# Patient Record
Sex: MALE | Race: WHITE | NOT HISPANIC OR LATINO | Employment: OTHER | URBAN - METROPOLITAN AREA
[De-identification: names, ages, dates, MRNs, and addresses within clinical notes are randomized per-mention and may not be internally consistent; named-entity substitution may affect disease eponyms.]

---

## 2023-02-24 PROBLEM — G89.29 CHRONIC LOW BACK PAIN: Status: ACTIVE | Noted: 2023-02-24

## 2023-02-24 PROBLEM — M54.50 CHRONIC LOW BACK PAIN: Status: ACTIVE | Noted: 2023-02-24

## 2023-02-24 PROBLEM — R59.0 MEDIASTINAL ADENOPATHY: Status: ACTIVE | Noted: 2023-02-24

## 2023-02-24 PROBLEM — J44.9 COPD, VERY SEVERE (MULTI): Status: ACTIVE | Noted: 2023-02-24

## 2023-02-24 PROBLEM — G47.00 INSOMNIA: Status: ACTIVE | Noted: 2023-02-24

## 2023-02-24 PROBLEM — F14.91 HISTORY OF COCAINE USE: Status: ACTIVE | Noted: 2023-02-24

## 2023-02-24 PROBLEM — R63.4 WEIGHT LOSS, UNINTENTIONAL: Status: ACTIVE | Noted: 2023-02-24

## 2023-02-24 PROBLEM — R12 HEARTBURN: Status: ACTIVE | Noted: 2023-02-24

## 2023-02-24 PROBLEM — G43.009 NONINTRACTABLE MIGRAINE, UNSPECIFIED MIGRAINE TYPE: Status: ACTIVE | Noted: 2023-02-24

## 2023-02-24 PROBLEM — R00.0 TACHYCARDIA: Status: ACTIVE | Noted: 2023-02-24

## 2023-02-24 PROBLEM — M50.20 CERVICAL HERNIATED DISC: Status: ACTIVE | Noted: 2023-02-24

## 2023-02-24 PROBLEM — F32.9 MDD (MAJOR DEPRESSIVE DISORDER), SINGLE EPISODE: Status: ACTIVE | Noted: 2023-02-24

## 2023-02-24 PROBLEM — M79.89 LEG SWELLING: Status: ACTIVE | Noted: 2023-02-24

## 2023-02-24 PROBLEM — F41.1 GENERALIZED ANXIETY DISORDER: Status: ACTIVE | Noted: 2023-02-24

## 2023-02-24 PROBLEM — F51.5 NIGHTMARE: Status: ACTIVE | Noted: 2023-02-24

## 2023-02-24 PROBLEM — F32.A DEPRESSION: Status: ACTIVE | Noted: 2023-02-24

## 2023-02-24 PROBLEM — R31.9 HEMATURIA: Status: ACTIVE | Noted: 2023-02-24

## 2023-02-24 PROBLEM — K59.00 CONSTIPATION: Status: ACTIVE | Noted: 2023-02-24

## 2023-02-24 PROBLEM — J30.9 ALLERGIC RHINITIS: Status: ACTIVE | Noted: 2023-02-24

## 2023-02-24 PROBLEM — R91.1 LUNG NODULE: Status: ACTIVE | Noted: 2023-02-24

## 2023-02-24 PROBLEM — R41.3 MEMORY LOSS: Status: ACTIVE | Noted: 2023-02-24

## 2023-02-24 PROBLEM — J45.40 MODERATE PERSISTENT ASTHMA WITHOUT COMPLICATION (HHS-HCC): Status: ACTIVE | Noted: 2023-02-24

## 2023-02-24 PROBLEM — G31.84 MILD COGNITIVE IMPAIRMENT WITH MEMORY LOSS: Status: ACTIVE | Noted: 2023-02-24

## 2023-02-24 PROBLEM — G20.A1 PARKINSON'S DISEASE (MULTI): Status: ACTIVE | Noted: 2023-02-24

## 2023-02-24 PROBLEM — J44.1 COPD EXACERBATION (MULTI): Status: ACTIVE | Noted: 2023-02-24

## 2023-02-24 PROBLEM — L98.491: Status: ACTIVE | Noted: 2023-02-24

## 2023-02-24 PROBLEM — F43.10 PTSD (POST-TRAUMATIC STRESS DISORDER): Status: ACTIVE | Noted: 2023-02-24

## 2023-02-24 PROBLEM — M51.27 DISPLACEMENT OF LUMBOSACRAL INTERVERTEBRAL DISC: Status: ACTIVE | Noted: 2023-02-24

## 2023-02-24 PROBLEM — Z86.69 HISTORY OF DEAFNESS: Status: ACTIVE | Noted: 2023-02-24

## 2023-02-24 PROBLEM — M54.32 SCIATICA OF LEFT SIDE: Status: ACTIVE | Noted: 2023-02-24

## 2023-02-24 PROBLEM — F31.61 BIPOLAR DISORDER, CURRENT EPISODE MIXED, MILD (MULTI): Status: ACTIVE | Noted: 2023-02-24

## 2023-02-24 RX ORDER — QUETIAPINE FUMARATE 25 MG/1
2 TABLET, FILM COATED ORAL NIGHTLY
COMMUNITY
Start: 2021-07-12 | End: 2024-01-02 | Stop reason: DRUGHIGH

## 2023-02-24 RX ORDER — CARBIDOPA AND LEVODOPA 25; 100 MG/1; MG/1
1 TABLET ORAL 3 TIMES DAILY
COMMUNITY
Start: 2021-05-07 | End: 2023-06-14

## 2023-02-24 RX ORDER — IPRATROPIUM BROMIDE AND ALBUTEROL SULFATE 2.5; .5 MG/3ML; MG/3ML
SOLUTION RESPIRATORY (INHALATION)
Status: ON HOLD | COMMUNITY
Start: 2021-11-08 | End: 2024-03-04 | Stop reason: ENTERED-IN-ERROR

## 2023-02-24 RX ORDER — PROPRANOLOL HYDROCHLORIDE 80 MG/1
CAPSULE, EXTENDED RELEASE ORAL
COMMUNITY
Start: 2021-11-10 | End: 2024-01-25 | Stop reason: ALTCHOICE

## 2023-02-24 RX ORDER — BUPRENORPHINE AND NALOXONE 8; 2 MG/1; MG/1
FILM, SOLUBLE BUCCAL; SUBLINGUAL
COMMUNITY
Start: 2021-11-10 | End: 2023-05-10 | Stop reason: ALTCHOICE

## 2023-02-24 RX ORDER — OMEPRAZOLE 40 MG/1
1 CAPSULE, DELAYED RELEASE ORAL
COMMUNITY
Start: 2021-09-01 | End: 2023-05-30 | Stop reason: SDUPTHER

## 2023-02-24 RX ORDER — TRAZODONE HYDROCHLORIDE 100 MG/1
100 TABLET ORAL NIGHTLY
COMMUNITY
Start: 2021-07-12 | End: 2024-03-18 | Stop reason: HOSPADM

## 2023-02-24 RX ORDER — BUDESONIDE, GLYCOPYRROLATE, AND FORMOTEROL FUMARATE 160; 9; 4.8 UG/1; UG/1; UG/1
2 AEROSOL, METERED RESPIRATORY (INHALATION) 2 TIMES DAILY
COMMUNITY
Start: 2021-11-29 | End: 2023-08-24 | Stop reason: SDUPTHER

## 2023-02-24 RX ORDER — MELOXICAM 15 MG/1
1 TABLET ORAL DAILY PRN
Status: ON HOLD | COMMUNITY
Start: 2021-11-29 | End: 2024-03-04 | Stop reason: ENTERED-IN-ERROR

## 2023-02-24 RX ORDER — MONTELUKAST SODIUM 10 MG/1
1 TABLET ORAL DAILY
Status: ON HOLD | COMMUNITY
Start: 2022-06-17 | End: 2024-03-04

## 2023-02-24 RX ORDER — ALBUTEROL SULFATE 90 UG/1
1-2 AEROSOL, METERED RESPIRATORY (INHALATION)
COMMUNITY
End: 2023-03-23 | Stop reason: SDUPTHER

## 2023-02-24 RX ORDER — PRAZOSIN HYDROCHLORIDE 2 MG/1
CAPSULE ORAL
COMMUNITY
Start: 2021-07-22 | End: 2023-05-10 | Stop reason: ALTCHOICE

## 2023-02-24 RX ORDER — BUSPIRONE HYDROCHLORIDE 30 MG/1
TABLET ORAL
COMMUNITY
Start: 2022-04-11 | End: 2024-01-02

## 2023-02-24 RX ORDER — ESCITALOPRAM OXALATE 10 MG/1
TABLET ORAL
COMMUNITY
Start: 2021-08-23 | End: 2023-05-10 | Stop reason: ALTCHOICE

## 2023-02-24 RX ORDER — HYDROXYZINE HYDROCHLORIDE 25 MG/1
TABLET, FILM COATED ORAL
Status: ON HOLD | COMMUNITY
Start: 2022-04-11 | End: 2024-03-04 | Stop reason: ENTERED-IN-ERROR

## 2023-02-25 RX ORDER — GABAPENTIN 300 MG/1
1 CAPSULE ORAL 2 TIMES DAILY
COMMUNITY
Start: 2016-05-04 | End: 2023-05-24

## 2023-02-25 RX ORDER — GABAPENTIN 600 MG/1
1 TABLET ORAL NIGHTLY
COMMUNITY
Start: 2022-08-29 | End: 2023-05-24 | Stop reason: SDUPTHER

## 2023-03-15 ENCOUNTER — APPOINTMENT (OUTPATIENT)
Dept: PRIMARY CARE | Facility: CLINIC | Age: 68
End: 2023-03-15

## 2023-03-22 DIAGNOSIS — J44.9 COPD, VERY SEVERE (MULTI): Primary | ICD-10-CM

## 2023-03-23 RX ORDER — ALBUTEROL SULFATE 90 UG/1
AEROSOL, METERED RESPIRATORY (INHALATION)
Qty: 18 G | Refills: 3 | Status: SHIPPED | OUTPATIENT
Start: 2023-03-23 | End: 2023-07-12

## 2023-05-01 DIAGNOSIS — J44.9 COPD, VERY SEVERE (MULTI): ICD-10-CM

## 2023-05-05 RX ORDER — PREDNISONE 10 MG/1
10 TABLET ORAL
COMMUNITY
Start: 2023-02-22 | End: 2023-05-05 | Stop reason: SDUPTHER

## 2023-05-05 RX ORDER — PREDNISONE 10 MG/1
TABLET ORAL
Qty: 39 TABLET | Refills: 0 | Status: SHIPPED | OUTPATIENT
Start: 2023-05-05 | End: 2023-05-24 | Stop reason: ALTCHOICE

## 2023-05-05 NOTE — TELEPHONE ENCOUNTER
Requested Prescriptions     Pending Prescriptions Disp Refills    predniSONE (Deltasone) 10 mg tablet 39 tablet 0     Sig: Take 2 tab PO x 7 days, then 1.5 tabs PO daily x 7 days, than 1 daily until gone

## 2023-05-10 ENCOUNTER — OFFICE VISIT (OUTPATIENT)
Dept: PRIMARY CARE | Facility: CLINIC | Age: 68
End: 2023-05-10
Payer: MEDICARE

## 2023-05-10 VITALS
HEART RATE: 85 BPM | HEIGHT: 67 IN | DIASTOLIC BLOOD PRESSURE: 58 MMHG | WEIGHT: 137 LBS | OXYGEN SATURATION: 88 % | BODY MASS INDEX: 21.5 KG/M2 | SYSTOLIC BLOOD PRESSURE: 95 MMHG

## 2023-05-10 DIAGNOSIS — F41.1 GENERALIZED ANXIETY DISORDER: Primary | ICD-10-CM

## 2023-05-10 DIAGNOSIS — J44.9 COPD, VERY SEVERE (MULTI): ICD-10-CM

## 2023-05-10 DIAGNOSIS — J06.9 UPPER RESPIRATORY TRACT INFECTION, UNSPECIFIED TYPE: ICD-10-CM

## 2023-05-10 DIAGNOSIS — F32.A DEPRESSION, UNSPECIFIED DEPRESSION TYPE: ICD-10-CM

## 2023-05-10 DIAGNOSIS — J45.40 MODERATE PERSISTENT ASTHMA WITHOUT COMPLICATION (HHS-HCC): ICD-10-CM

## 2023-05-10 DIAGNOSIS — G47.00 INSOMNIA, UNSPECIFIED TYPE: ICD-10-CM

## 2023-05-10 PROCEDURE — 1160F RVW MEDS BY RX/DR IN RCRD: CPT | Performed by: NURSE PRACTITIONER

## 2023-05-10 PROCEDURE — 1159F MED LIST DOCD IN RCRD: CPT | Performed by: NURSE PRACTITIONER

## 2023-05-10 PROCEDURE — 99214 OFFICE O/P EST MOD 30 MIN: CPT | Performed by: NURSE PRACTITIONER

## 2023-05-10 RX ORDER — ESCITALOPRAM OXALATE 10 MG/1
10 TABLET ORAL DAILY
COMMUNITY
End: 2023-05-10 | Stop reason: SDUPTHER

## 2023-05-10 RX ORDER — ESCITALOPRAM OXALATE 20 MG/1
20 TABLET ORAL DAILY
Qty: 90 TABLET | Refills: 1 | Status: SHIPPED | OUTPATIENT
Start: 2023-05-10 | End: 2023-07-13 | Stop reason: SDUPTHER

## 2023-05-10 RX ORDER — HYDROXYZINE PAMOATE 25 MG/1
25 CAPSULE ORAL NIGHTLY
COMMUNITY
Start: 2023-05-01 | End: 2024-03-18 | Stop reason: HOSPADM

## 2023-05-10 RX ORDER — ALBUTEROL SULFATE 1.25 MG/3ML
1.25 SOLUTION RESPIRATORY (INHALATION) EVERY 4 HOURS PRN
Qty: 75 ML | Refills: 2 | Status: SHIPPED | OUTPATIENT
Start: 2023-05-10 | End: 2023-07-13 | Stop reason: SDUPTHER

## 2023-05-10 RX ORDER — CLONIDINE HYDROCHLORIDE 0.1 MG/1
0.1 TABLET ORAL 2 TIMES DAILY
Status: ON HOLD | COMMUNITY
Start: 2023-05-02 | End: 2024-03-04 | Stop reason: ENTERED-IN-ERROR

## 2023-05-10 RX ORDER — DOXYCYCLINE 100 MG/1
100 CAPSULE ORAL 2 TIMES DAILY
Qty: 14 CAPSULE | Refills: 0 | Status: SHIPPED | OUTPATIENT
Start: 2023-05-10 | End: 2023-05-20

## 2023-05-10 ASSESSMENT — ENCOUNTER SYMPTOMS
CHILLS: 0
FEVER: 0
CONSTIPATION: 0
RHINORRHEA: 1
WEAKNESS: 0
VOMITING: 0
SHORTNESS OF BREATH: 1
FATIGUE: 1
PSYCHIATRIC NEGATIVE: 1
HEADACHES: 0
NUMBNESS: 0
ABDOMINAL PAIN: 0
MUSCULOSKELETAL NEGATIVE: 1
DIZZINESS: 0
NAUSEA: 0
COUGH: 1
DIARRHEA: 0
PALPITATIONS: 0
SORE THROAT: 0

## 2023-05-10 ASSESSMENT — PATIENT HEALTH QUESTIONNAIRE - PHQ9
SUM OF ALL RESPONSES TO PHQ9 QUESTIONS 1 AND 2: 0
1. LITTLE INTEREST OR PLEASURE IN DOING THINGS: NOT AT ALL
2. FEELING DOWN, DEPRESSED OR HOPELESS: NOT AT ALL

## 2023-05-10 NOTE — ASSESSMENT & PLAN NOTE
Continue all medicines ordered by pulmonary as prescribed  Follow-up with pulmonary soon, call to make an appointment

## 2023-05-10 NOTE — PROGRESS NOTES
"Subjective   Patient ID: Chris Wu is a 68 y.o. male who presents for follow upof continued congestion.      HPI   Increased shortness of breath  Still smoking, supplemental oxygen at 5L today in the office  Feels very congested in his lungs  Moist non productive cough  Just finishing medrol dose pack  Having to sit at an incline to sleep, that is new for him  No chest pain or dizziness  Sinus congestion and runny nose for about a month      Review of Systems   Constitutional:  Positive for fatigue. Negative for chills and fever.   HENT:  Positive for congestion, postnasal drip and rhinorrhea. Negative for ear pain and sore throat.    Eyes:  Negative for visual disturbance.   Respiratory:  Positive for cough and shortness of breath.    Cardiovascular:  Negative for chest pain, palpitations and leg swelling.   Gastrointestinal:  Negative for abdominal pain, constipation, diarrhea, nausea and vomiting.   Genitourinary: Negative.    Musculoskeletal: Negative.    Skin:  Negative for rash.   Neurological:  Negative for dizziness, weakness, numbness and headaches.   Psychiatric/Behavioral: Negative.         Objective   BP 95/58   Pulse 85   Ht 1.702 m (5' 7\")   Wt 62.1 kg (137 lb)   SpO2 (!) 88%   BMI 21.46 kg/m²     Physical Exam  Constitutional:       General: He is not in acute distress.     Appearance: Normal appearance.   HENT:      Head: Normocephalic and atraumatic.      Right Ear: Tympanic membrane, ear canal and external ear normal.      Left Ear: Tympanic membrane, ear canal and external ear normal.      Nose: Nose normal.      Mouth/Throat:      Mouth: Mucous membranes are moist.      Pharynx: Oropharynx is clear.   Eyes:      Extraocular Movements: Extraocular movements intact.      Conjunctiva/sclera: Conjunctivae normal.      Pupils: Pupils are equal, round, and reactive to light.   Cardiovascular:      Rate and Rhythm: Normal rate and regular rhythm.      Pulses: Normal pulses.      Heart " sounds: Normal heart sounds. No murmur heard.  Pulmonary:      Effort: Pulmonary effort is normal.      Breath sounds: Decreased air movement present. Examination of the right-upper field reveals wheezing and rhonchi. Examination of the left-upper field reveals wheezing and rhonchi. Examination of the right-middle field reveals wheezing and rhonchi. Examination of the left-middle field reveals wheezing and rhonchi. Examination of the right-lower field reveals wheezing and rhonchi. Examination of the left-lower field reveals wheezing and rhonchi. Wheezing and rhonchi present. No rales.   Abdominal:      General: Bowel sounds are normal.      Palpations: Abdomen is soft.      Tenderness: There is no abdominal tenderness.   Musculoskeletal:         General: Normal range of motion.      Cervical back: Normal range of motion and neck supple.   Lymphadenopathy:      Comments: No lymphadenopathy noted   Skin:     General: Skin is warm and dry.      Findings: No rash.   Neurological:      General: No focal deficit present.      Mental Status: He is alert and oriented to person, place, and time.      Cranial Nerves: No cranial nerve deficit.      Coordination: Coordination normal.      Gait: Gait normal.   Psychiatric:         Mood and Affect: Mood normal.         Behavior: Behavior normal.         Assessment/Plan   Problem List Items Addressed This Visit          Nervous    Insomnia       Respiratory    COPD, very severe (CMS/HCC)     Continue all medicines ordered by pulmonary as prescribed  Follow-up with pulmonary soon, call to make an appointment         Relevant Medications    albuterol 1.25 mg/3 mL nebulizer solution    Moderate persistent asthma without complication       Infectious/Inflammatory    Upper respiratory tract infection     Start doxycycline 100 mg twice daily for 10 days  Use albuterol 3 mL aerosol treatments every 4 hours and as needed  Do not use albuterol inhaler while using aerosols  We will consider  chest x-ray at next visit, pulmonary visit and or cardiac cause for increased shortness of breath         Relevant Medications    doxycycline (Vibramycin) 100 mg capsule       Other    Depression     Increase Lexapro to 20 mg daily         Generalized anxiety disorder - Primary    Relevant Medications    escitalopram (Lexapro) 20 mg tablet

## 2023-05-10 NOTE — ASSESSMENT & PLAN NOTE
Start doxycycline 100 mg twice daily for 10 days  Use albuterol 3 mL aerosol treatments every 4 hours and as needed  Do not use albuterol inhaler while using aerosols  We will consider chest x-ray at next visit, pulmonary visit and or cardiac cause for increased shortness of breath

## 2023-05-10 NOTE — PATIENT INSTRUCTIONS
Increase her Lexapro to 20 mg daily  Start doxycycline 100 mg twice a day for 10 days.  This is an antibiotic  [facility]  Call to make an appointment with pulmonary  Use aerosol albuterol instead of inhaler until next appointment with me  Always encouraged to quit smoking  Consider chest x-ray if no improvement at next visit   No complaints

## 2023-05-24 ENCOUNTER — OFFICE VISIT (OUTPATIENT)
Dept: PRIMARY CARE | Facility: CLINIC | Age: 68
End: 2023-05-24
Payer: MEDICARE

## 2023-05-24 VITALS
BODY MASS INDEX: 21.5 KG/M2 | SYSTOLIC BLOOD PRESSURE: 106 MMHG | DIASTOLIC BLOOD PRESSURE: 74 MMHG | WEIGHT: 137 LBS | HEIGHT: 67 IN | HEART RATE: 96 BPM | OXYGEN SATURATION: 90 %

## 2023-05-24 DIAGNOSIS — J44.9 COPD, VERY SEVERE (MULTI): Primary | ICD-10-CM

## 2023-05-24 DIAGNOSIS — M54.40 CHRONIC LOW BACK PAIN WITH SCIATICA, SCIATICA LATERALITY UNSPECIFIED, UNSPECIFIED BACK PAIN LATERALITY: ICD-10-CM

## 2023-05-24 DIAGNOSIS — F32.A DEPRESSION, UNSPECIFIED DEPRESSION TYPE: ICD-10-CM

## 2023-05-24 DIAGNOSIS — G89.29 CHRONIC LOW BACK PAIN WITH SCIATICA, SCIATICA LATERALITY UNSPECIFIED, UNSPECIFIED BACK PAIN LATERALITY: ICD-10-CM

## 2023-05-24 PROCEDURE — 1160F RVW MEDS BY RX/DR IN RCRD: CPT | Performed by: NURSE PRACTITIONER

## 2023-05-24 PROCEDURE — 99214 OFFICE O/P EST MOD 30 MIN: CPT | Performed by: NURSE PRACTITIONER

## 2023-05-24 PROCEDURE — 1159F MED LIST DOCD IN RCRD: CPT | Performed by: NURSE PRACTITIONER

## 2023-05-24 RX ORDER — PREDNISONE 20 MG/1
20 TABLET ORAL DAILY
Qty: 90 TABLET | Refills: 1 | Status: SHIPPED | OUTPATIENT
Start: 2023-05-24 | End: 2023-07-13 | Stop reason: SDUPTHER

## 2023-05-24 RX ORDER — PREDNISONE 20 MG/1
20 TABLET ORAL DAILY
Qty: 21 TABLET | Refills: 8 | Status: SHIPPED | OUTPATIENT
Start: 2023-05-24 | End: 2023-05-24 | Stop reason: ALTCHOICE

## 2023-05-24 RX ORDER — GABAPENTIN 600 MG/1
600 TABLET ORAL 3 TIMES DAILY
Qty: 270 TABLET | Refills: 1 | Status: SHIPPED | OUTPATIENT
Start: 2023-05-24 | End: 2023-07-13 | Stop reason: SDUPTHER

## 2023-05-24 RX ORDER — BUPRENORPHINE HYDROCHLORIDE AND NALOXONE HYDROCHLORIDE DIHYDRATE 8; 2 MG/1; MG/1
2 TABLET SUBLINGUAL DAILY
COMMUNITY

## 2023-05-24 ASSESSMENT — ENCOUNTER SYMPTOMS
FEVER: 0
PSYCHIATRIC NEGATIVE: 1
NAUSEA: 0
SORE THROAT: 0
WEAKNESS: 0
CONSTIPATION: 0
FATIGUE: 0
MUSCULOSKELETAL NEGATIVE: 1
NUMBNESS: 0
DIZZINESS: 0
HEADACHES: 0
VOMITING: 0
ABDOMINAL PAIN: 0
SHORTNESS OF BREATH: 0
DIARRHEA: 0
COUGH: 0
PALPITATIONS: 0
CHILLS: 0

## 2023-05-24 NOTE — PATIENT INSTRUCTIONS
Continue albuterol inhaler and aerosols as needed  Continue with Lexapro 20 mg  Increase gabapentin to 3 times daily, may take 1200 mg in the morning if needed  Schedule appointment with pulmonary  Take prednisone 20 mg daily as prescribed

## 2023-05-24 NOTE — PROGRESS NOTES
"Subjective   Patient ID: Chris Wu is a 68 y.o. male who presents for follow up.    HPI   Increased shortness of breath  Still smoking, supplemental oxygen at 5L today in the office  Feels very congested in his lungs--  getting better with steroid and albuterol aerosols.  Moist productive cough of light grayish sputum.  Still having to sit at an incline to sleep  No chest pain or dizziness  Sinus congestion and runny nose has greatly improved.  Feeling in a much better mood with increased dose of Lexapro  Taking medications as prescribed      Review of Systems   Constitutional:  Negative for chills, fatigue and fever.   HENT:  Negative for congestion, ear pain and sore throat.    Eyes:  Negative for visual disturbance.   Respiratory:  Negative for cough and shortness of breath.    Cardiovascular:  Negative for chest pain, palpitations and leg swelling.   Gastrointestinal:  Negative for abdominal pain, constipation, diarrhea, nausea and vomiting.   Genitourinary: Negative.    Musculoskeletal: Negative.    Skin:  Negative for rash.   Neurological:  Negative for dizziness, weakness, numbness and headaches.   Psychiatric/Behavioral: Negative.         Objective   /74   Pulse 96   Ht 1.702 m (5' 7\")   Wt 62.1 kg (137 lb)   SpO2 90%   BMI 21.46 kg/m²     Physical Exam  Constitutional:       General: He is not in acute distress.     Appearance: Normal appearance.   HENT:      Head: Normocephalic and atraumatic.      Right Ear: Tympanic membrane, ear canal and external ear normal.      Left Ear: Tympanic membrane, ear canal and external ear normal.      Nose: Nose normal.      Mouth/Throat:      Mouth: Mucous membranes are moist.      Pharynx: Oropharynx is clear.   Eyes:      Extraocular Movements: Extraocular movements intact.      Conjunctiva/sclera: Conjunctivae normal.      Pupils: Pupils are equal, round, and reactive to light.   Cardiovascular:      Rate and Rhythm: Normal rate and regular rhythm.    "   Pulses: Normal pulses.      Heart sounds: Normal heart sounds. No murmur heard.  Pulmonary:      Effort: Pulmonary effort is normal.      Breath sounds: Decreased air movement present. Examination of the right-upper field reveals wheezing. Examination of the left-upper field reveals wheezing. Examination of the right-lower field reveals rales. Examination of the left-lower field reveals rales. Wheezing and rales present. No rhonchi.      Comments: On O2 at 5L  Abdominal:      General: Bowel sounds are normal.      Palpations: Abdomen is soft.      Tenderness: There is no abdominal tenderness.   Musculoskeletal:         General: Normal range of motion.      Cervical back: Normal range of motion and neck supple.   Lymphadenopathy:      Comments: No lymphadenopathy noted   Skin:     General: Skin is warm and dry.      Findings: No rash.   Neurological:      General: No focal deficit present.      Mental Status: He is alert and oriented to person, place, and time.      Cranial Nerves: No cranial nerve deficit.      Coordination: Coordination normal.      Gait: Gait normal.   Psychiatric:         Mood and Affect: Mood normal.         Behavior: Behavior normal.         Assessment/Plan   Problem List Items Addressed This Visit          Respiratory    COPD, very severe (CMS/HCC) - Primary     Continue prednisone 20 mg daily for lung inflammation  Continue albuterol aerosols as needed.         Relevant Medications    predniSONE (Deltasone) 20 mg tablet       Other    Chronic low back pain     Increase gabapentin as prescribed         Relevant Medications    gabapentin (Neurontin) 600 mg tablet    Depression     Continue lexapro 20 mg daily

## 2023-05-30 DIAGNOSIS — R12 HEARTBURN: ICD-10-CM

## 2023-05-30 NOTE — TELEPHONE ENCOUNTER
Requested Prescriptions     Pending Prescriptions Disp Refills    omeprazole (PriLOSEC) 40 mg DR capsule 90 capsule 1     Sig: Take 1 capsule (40 mg) by mouth once daily in the morning. Take before meals.

## 2023-05-31 RX ORDER — OMEPRAZOLE 40 MG/1
40 CAPSULE, DELAYED RELEASE ORAL
Qty: 90 CAPSULE | Refills: 0 | Status: SHIPPED | OUTPATIENT
Start: 2023-05-31 | End: 2023-08-09

## 2023-06-13 DIAGNOSIS — G20.A1 PARKINSON'S DISEASE (MULTI): ICD-10-CM

## 2023-06-14 RX ORDER — CARBIDOPA AND LEVODOPA 25; 100 MG/1; MG/1
TABLET ORAL
Qty: 84 TABLET | Refills: 1 | Status: SHIPPED | OUTPATIENT
Start: 2023-06-14 | End: 2023-08-09

## 2023-06-28 ENCOUNTER — APPOINTMENT (OUTPATIENT)
Dept: PRIMARY CARE | Facility: CLINIC | Age: 68
End: 2023-06-28

## 2023-07-11 DIAGNOSIS — J44.9 COPD, VERY SEVERE (MULTI): ICD-10-CM

## 2023-07-12 RX ORDER — ALBUTEROL SULFATE 90 UG/1
AEROSOL, METERED RESPIRATORY (INHALATION)
Qty: 18 G | Refills: 3 | Status: SHIPPED | OUTPATIENT
Start: 2023-07-12 | End: 2023-11-01

## 2023-07-13 DIAGNOSIS — M54.40 CHRONIC LOW BACK PAIN WITH SCIATICA, SCIATICA LATERALITY UNSPECIFIED, UNSPECIFIED BACK PAIN LATERALITY: ICD-10-CM

## 2023-07-13 DIAGNOSIS — F41.1 GENERALIZED ANXIETY DISORDER: ICD-10-CM

## 2023-07-13 DIAGNOSIS — J44.9 COPD, VERY SEVERE (MULTI): ICD-10-CM

## 2023-07-13 DIAGNOSIS — G89.29 CHRONIC LOW BACK PAIN WITH SCIATICA, SCIATICA LATERALITY UNSPECIFIED, UNSPECIFIED BACK PAIN LATERALITY: ICD-10-CM

## 2023-07-13 RX ORDER — ESCITALOPRAM OXALATE 20 MG/1
10 TABLET ORAL DAILY
Qty: 45 TABLET | Refills: 1 | Status: CANCELLED | OUTPATIENT
Start: 2023-07-13 | End: 2024-01-09

## 2023-07-13 RX ORDER — ESCITALOPRAM OXALATE 20 MG/1
20 TABLET ORAL DAILY
Qty: 90 TABLET | Refills: 1 | Status: SHIPPED | OUTPATIENT
Start: 2023-07-13 | End: 2024-03-21 | Stop reason: SDUPTHER

## 2023-07-13 RX ORDER — GABAPENTIN 600 MG/1
600 TABLET ORAL 3 TIMES DAILY
Qty: 270 TABLET | Refills: 1 | Status: SHIPPED | OUTPATIENT
Start: 2023-07-13 | End: 2023-12-21

## 2023-07-13 RX ORDER — ALBUTEROL SULFATE 1.25 MG/3ML
1.25 SOLUTION RESPIRATORY (INHALATION) EVERY 4 HOURS PRN
Qty: 75 ML | Refills: 2 | Status: SHIPPED | OUTPATIENT
Start: 2023-07-13 | End: 2023-10-05

## 2023-07-13 RX ORDER — PREDNISONE 20 MG/1
20 TABLET ORAL DAILY
Qty: 90 TABLET | Refills: 1 | Status: SHIPPED | OUTPATIENT
Start: 2023-07-13 | End: 2023-12-21

## 2023-08-08 DIAGNOSIS — G20.A1 PARKINSON'S DISEASE (MULTI): ICD-10-CM

## 2023-08-08 DIAGNOSIS — R12 HEARTBURN: ICD-10-CM

## 2023-08-09 RX ORDER — OMEPRAZOLE 40 MG/1
40 CAPSULE, DELAYED RELEASE ORAL
Qty: 28 CAPSULE | Refills: 2 | Status: SHIPPED | OUTPATIENT
Start: 2023-08-09 | End: 2023-11-01

## 2023-08-09 RX ORDER — CARBIDOPA AND LEVODOPA 25; 100 MG/1; MG/1
TABLET ORAL
Qty: 84 TABLET | Refills: 1 | Status: SHIPPED | OUTPATIENT
Start: 2023-08-09 | End: 2023-10-05

## 2023-08-24 DIAGNOSIS — J44.1 COPD EXACERBATION (MULTI): ICD-10-CM

## 2023-08-24 DIAGNOSIS — J44.9 COPD, VERY SEVERE (MULTI): ICD-10-CM

## 2023-08-24 RX ORDER — BUDESONIDE, GLYCOPYRROLATE, AND FORMOTEROL FUMARATE 160; 9; 4.8 UG/1; UG/1; UG/1
2 AEROSOL, METERED RESPIRATORY (INHALATION) 2 TIMES DAILY
Qty: 10.7 G | Refills: 5 | Status: SHIPPED | OUTPATIENT
Start: 2023-08-24 | End: 2024-01-23

## 2023-08-24 NOTE — TELEPHONE ENCOUNTER
Requested Prescriptions     Pending Prescriptions Disp Refills    budesonide-glycopyr-formoterol (Breztri Aerosphere) 160-9-4.8 mcg/actuation HFA aerosol inhaler 10.7 g 5     Sig: Inhale 2 puffs 2 times a day.

## 2023-10-02 ENCOUNTER — APPOINTMENT (OUTPATIENT)
Dept: PRIMARY CARE | Facility: CLINIC | Age: 68
End: 2023-10-02

## 2023-10-02 RX ORDER — CHOLECALCIFEROL (VITAMIN D3) 1250 MCG
1 TABLET ORAL
Status: ON HOLD | COMMUNITY
Start: 2019-05-20 | End: 2024-03-04 | Stop reason: ENTERED-IN-ERROR

## 2023-10-05 DIAGNOSIS — J44.9 COPD, VERY SEVERE (MULTI): ICD-10-CM

## 2023-10-05 DIAGNOSIS — G20.A1 PARKINSON'S DISEASE (MULTI): ICD-10-CM

## 2023-10-05 RX ORDER — ALBUTEROL SULFATE 1.25 MG/3ML
SOLUTION RESPIRATORY (INHALATION) EVERY 4 HOURS PRN
Qty: 75 ML | Refills: 2 | Status: SHIPPED | OUTPATIENT
Start: 2023-10-05 | End: 2023-12-21

## 2023-10-05 RX ORDER — CARBIDOPA AND LEVODOPA 25; 100 MG/1; MG/1
TABLET ORAL
Qty: 84 TABLET | Refills: 1 | Status: SHIPPED | OUTPATIENT
Start: 2023-10-05 | End: 2023-11-28

## 2023-10-20 ENCOUNTER — APPOINTMENT (OUTPATIENT)
Dept: PRIMARY CARE | Facility: CLINIC | Age: 68
End: 2023-10-20

## 2023-10-31 DIAGNOSIS — J44.9 COPD, VERY SEVERE (MULTI): ICD-10-CM

## 2023-10-31 DIAGNOSIS — R12 HEARTBURN: ICD-10-CM

## 2023-11-01 ENCOUNTER — OFFICE VISIT (OUTPATIENT)
Dept: PRIMARY CARE | Facility: CLINIC | Age: 68
End: 2023-11-01
Payer: MEDICARE

## 2023-11-01 VITALS
HEART RATE: 98 BPM | SYSTOLIC BLOOD PRESSURE: 105 MMHG | BODY MASS INDEX: 27.47 KG/M2 | HEIGHT: 67 IN | DIASTOLIC BLOOD PRESSURE: 64 MMHG | WEIGHT: 175 LBS | OXYGEN SATURATION: 93 %

## 2023-11-01 DIAGNOSIS — J96.11 CHRONIC RESPIRATORY FAILURE WITH HYPOXIA (MULTI): ICD-10-CM

## 2023-11-01 DIAGNOSIS — Z91.89 AT RISK FOR FLUID VOLUME OVERLOAD: ICD-10-CM

## 2023-11-01 DIAGNOSIS — F31.61 BIPOLAR DISORDER, CURRENT EPISODE MIXED, MILD (MULTI): ICD-10-CM

## 2023-11-01 DIAGNOSIS — J44.9 COPD, VERY SEVERE (MULTI): ICD-10-CM

## 2023-11-01 DIAGNOSIS — Z00.00 MEDICARE ANNUAL WELLNESS VISIT, SUBSEQUENT: Primary | ICD-10-CM

## 2023-11-01 DIAGNOSIS — I50.33 CHF (CONGESTIVE HEART FAILURE), NYHA CLASS III, ACUTE ON CHRONIC, DIASTOLIC (MULTI): ICD-10-CM

## 2023-11-01 DIAGNOSIS — Z00.00 ROUTINE GENERAL MEDICAL EXAMINATION AT HEALTH CARE FACILITY: ICD-10-CM

## 2023-11-01 PROBLEM — D68.51 ACTIVATED PROTEIN C RESISTANCE (MULTI): Status: ACTIVE | Noted: 2023-11-01

## 2023-11-01 PROBLEM — D68.51 ACTIVATED PROTEIN C RESISTANCE (MULTI): Status: RESOLVED | Noted: 2023-11-01 | Resolved: 2023-11-01

## 2023-11-01 PROCEDURE — 1170F FXNL STATUS ASSESSED: CPT | Performed by: NURSE PRACTITIONER

## 2023-11-01 PROCEDURE — 99214 OFFICE O/P EST MOD 30 MIN: CPT | Performed by: NURSE PRACTITIONER

## 2023-11-01 PROCEDURE — G0439 PPPS, SUBSEQ VISIT: HCPCS | Performed by: NURSE PRACTITIONER

## 2023-11-01 PROCEDURE — 1159F MED LIST DOCD IN RCRD: CPT | Performed by: NURSE PRACTITIONER

## 2023-11-01 PROCEDURE — 1160F RVW MEDS BY RX/DR IN RCRD: CPT | Performed by: NURSE PRACTITIONER

## 2023-11-01 RX ORDER — ALBUTEROL SULFATE 90 UG/1
AEROSOL, METERED RESPIRATORY (INHALATION)
Qty: 18 G | Refills: 3 | Status: SHIPPED | OUTPATIENT
Start: 2023-11-01

## 2023-11-01 RX ORDER — POTASSIUM CHLORIDE 20 MEQ/1
20 TABLET, EXTENDED RELEASE ORAL DAILY
Qty: 30 TABLET | Refills: 11 | Status: SHIPPED | OUTPATIENT
Start: 2023-11-01 | End: 2024-03-18 | Stop reason: HOSPADM

## 2023-11-01 RX ORDER — FUROSEMIDE 40 MG/1
40 TABLET ORAL DAILY
Qty: 30 TABLET | Refills: 11 | Status: SHIPPED | OUTPATIENT
Start: 2023-11-01 | End: 2023-11-08 | Stop reason: ALTCHOICE

## 2023-11-01 RX ORDER — OMEPRAZOLE 40 MG/1
40 CAPSULE, DELAYED RELEASE ORAL
Qty: 28 CAPSULE | Refills: 3 | Status: SHIPPED | OUTPATIENT
Start: 2023-11-01 | End: 2024-02-21

## 2023-11-01 ASSESSMENT — ENCOUNTER SYMPTOMS
PSYCHIATRIC NEGATIVE: 1
COUGH: 0
SHORTNESS OF BREATH: 0
CHILLS: 0
PALPITATIONS: 0
FATIGUE: 1
DIARRHEA: 0
VOMITING: 0
SORE THROAT: 0
FEVER: 0
NAUSEA: 0
WEAKNESS: 0
DIZZINESS: 0
HEADACHES: 0
NUMBNESS: 0
MUSCULOSKELETAL NEGATIVE: 1
ABDOMINAL PAIN: 0
UNEXPECTED WEIGHT CHANGE: 1
CONSTIPATION: 0

## 2023-11-01 ASSESSMENT — ACTIVITIES OF DAILY LIVING (ADL)
MANAGING_FINANCES: INDEPENDENT
DOING_HOUSEWORK: TOTAL CARE
GROCERY_SHOPPING: NEEDS ASSISTANCE
BATHING: NEEDS ASSISTANCE
TAKING_MEDICATION: NEEDS ASSISTANCE
DRESSING: NEEDS ASSISTANCE

## 2023-11-01 NOTE — PROGRESS NOTES
"Subjective   Patient ID: Chris Wu is a 68 y.o. male who presents for follow up.    HPI   Here today with   Condition seems to be worsening.  At risk for fluid volume overload.  Has gained ??approximately 40 pounds.  Oxygen remains at 5 L.  Cannot lay down flat to sleep  Legs are now swollen all the way up to the thigh.  Labored breathing while sitting in the office.  Has been sleeping quite a bit.  Requesting home health care so he can stay at home.  Refuses to go to a nursing home for any reason.  Discussed having echocardiogram.  Patient states he cannot afford it.  Repeat vital signs stable in the office.  Discussed close follow-up to keep patient at home.  Denies chest pain.  Taking medications as prescribed       Review of Systems   Constitutional:  Positive for fatigue and unexpected weight change. Negative for chills and fever.   HENT:  Negative for congestion, ear pain and sore throat.    Eyes:  Negative for visual disturbance.   Respiratory:  Negative for cough and shortness of breath.    Cardiovascular:  Negative for chest pain, palpitations and leg swelling.   Gastrointestinal:  Negative for abdominal pain, constipation, diarrhea, nausea and vomiting.   Genitourinary: Negative.    Musculoskeletal: Negative.    Skin:  Negative for rash.   Neurological:  Negative for dizziness, weakness, numbness and headaches.   Psychiatric/Behavioral: Negative.         Objective   /64   Pulse 98   Ht 1.702 m (5' 7\")   Wt 79.4 kg (175 lb)   SpO2 93%   BMI 27.41 kg/m²     Physical Exam  Constitutional:       General: He is not in acute distress.     Appearance: Normal appearance. He is ill-appearing.   HENT:      Head: Normocephalic and atraumatic.      Right Ear: Tympanic membrane, ear canal and external ear normal.      Left Ear: Tympanic membrane, ear canal and external ear normal.      Nose: Nose normal.      Mouth/Throat:      Mouth: Mucous membranes are moist.      Pharynx: Oropharynx is " clear.   Eyes:      Extraocular Movements: Extraocular movements intact.      Conjunctiva/sclera: Conjunctivae normal.      Pupils: Pupils are equal, round, and reactive to light.   Cardiovascular:      Rate and Rhythm: Normal rate and regular rhythm.      Pulses: Normal pulses.      Heart sounds: Normal heart sounds. No murmur heard.  Pulmonary:      Effort: Pulmonary effort is normal. Tachypnea present.      Breath sounds: Decreased air movement present. Examination of the right-lower field reveals rales. Examination of the left-lower field reveals rales. Decreased breath sounds and rales present. No wheezing or rhonchi.   Abdominal:      General: Bowel sounds are normal.      Palpations: Abdomen is soft.      Tenderness: There is no abdominal tenderness.   Musculoskeletal:         General: Normal range of motion.      Cervical back: Normal range of motion and neck supple.      Right lower le+ Edema present.      Left lower le+ Edema present.   Lymphadenopathy:      Comments: No lymphadenopathy noted   Skin:     General: Skin is warm and dry.      Coloration: Skin is pale.      Findings: No rash.   Neurological:      General: No focal deficit present.      Mental Status: He is alert and oriented to person, place, and time.      Cranial Nerves: No cranial nerve deficit.      Coordination: Coordination normal.      Gait: Gait normal.   Psychiatric:         Mood and Affect: Mood normal.         Behavior: Behavior normal.         Assessment/Plan   Problem List Items Addressed This Visit             ICD-10-CM    Bipolar disorder, current episode mixed, mild (CMS/HCC) F31.61    COPD, very severe (CMS/HCC) J44.9    Relevant Orders    Referral to Home Care    Chronic respiratory failure with hypoxia (CMS/HCC) J96.11    Relevant Orders    Referral to Home Care    Transthoracic Echo (TTE) Complete    At risk for fluid volume overload Z91.89     Start Lasix 40 mg twice daily for 3 days then continue once daily until  seen in the office again.  Take potassium 20 mEq daily while taking Lasix  Daily weights if possible, and record at home.  Reduce dietary sodium.  Try to restrict her fluids some         Relevant Medications    furosemide (Lasix) 40 mg tablet    potassium chloride CR (Klor-Con M20) 20 mEq ER tablet    Other Relevant Orders    Referral to Home Care    Transthoracic Echo (TTE) Complete    Medicare annual wellness visit, subsequent - Primary Z00.00     Other Visit Diagnoses         Codes    Routine general medical examination at health care facility     Z00.00    CHF (congestive heart failure), NYHA class III, acute on chronic, diastolic (CMS/[facility])     I50.33    Relevant Orders    Transthoracic Echo (TTE) Complete        We will have to defer echocardiogram at this time for financial reasons  Follow-up in 1 week

## 2023-11-01 NOTE — PATIENT INSTRUCTIONS
Start Lasix 40 mg twice daily for 3 days.  Continue taking Lasix once a day until next office visit in 1 week  Start potassium 20 mEq daily while on Lasix  Daily weights and record if possible  Home health referral for weakness and deconditioning  Follow-up in 1 week

## 2023-11-01 NOTE — ASSESSMENT & PLAN NOTE
Start Lasix 40 mg twice daily for 3 days then continue once daily until seen in the office again.  Take potassium 20 mEq daily while taking Lasix  Daily weights if possible, and record at home.  Reduce dietary sodium.  Try to restrict her fluids some

## 2023-11-03 ENCOUNTER — HOME HEALTH ADMISSION (OUTPATIENT)
Dept: HOME HEALTH SERVICES | Facility: HOME HEALTH | Age: 68
End: 2023-11-03
Payer: MEDICARE

## 2023-11-07 ENCOUNTER — HOME CARE VISIT (OUTPATIENT)
Dept: HOME HEALTH SERVICES | Facility: HOME HEALTH | Age: 68
End: 2023-11-07
Payer: MEDICARE

## 2023-11-07 VITALS
BODY MASS INDEX: 24.34 KG/M2 | HEIGHT: 70 IN | WEIGHT: 170 LBS | SYSTOLIC BLOOD PRESSURE: 110 MMHG | HEART RATE: 68 BPM | DIASTOLIC BLOOD PRESSURE: 60 MMHG | TEMPERATURE: 97.2 F | RESPIRATION RATE: 20 BRPM

## 2023-11-07 PROCEDURE — 0023 HH SOC

## 2023-11-07 PROCEDURE — G0299 HHS/HOSPICE OF RN EA 15 MIN: HCPCS

## 2023-11-07 ASSESSMENT — ENCOUNTER SYMPTOMS
PERSON REPORTING PAIN: PATIENT
SUBJECTIVE PAIN PROGRESSION: WAXING AND WANING
PAIN: 1
PAIN LOCATION: BACK
PAIN SEVERITY GOAL: 4/10
HIGHEST PAIN SEVERITY IN PAST 24 HOURS: 10/10
LOWEST PAIN SEVERITY IN PAST 24 HOURS: 5/10

## 2023-11-08 ENCOUNTER — OFFICE VISIT (OUTPATIENT)
Dept: PRIMARY CARE | Facility: CLINIC | Age: 68
End: 2023-11-08
Payer: MEDICARE

## 2023-11-08 VITALS
HEIGHT: 70 IN | BODY MASS INDEX: 24.91 KG/M2 | WEIGHT: 174 LBS | HEART RATE: 122 BPM | SYSTOLIC BLOOD PRESSURE: 112 MMHG | OXYGEN SATURATION: 89 % | DIASTOLIC BLOOD PRESSURE: 73 MMHG

## 2023-11-08 DIAGNOSIS — F11.10 HEROIN ABUSE (MULTI): ICD-10-CM

## 2023-11-08 DIAGNOSIS — I10 ESSENTIAL (PRIMARY) HYPERTENSION: ICD-10-CM

## 2023-11-08 DIAGNOSIS — J44.1 COPD EXACERBATION (MULTI): Primary | ICD-10-CM

## 2023-11-08 PROBLEM — L98.491: Status: RESOLVED | Noted: 2023-02-24 | Resolved: 2023-11-08

## 2023-11-08 PROCEDURE — 1159F MED LIST DOCD IN RCRD: CPT | Performed by: NURSE PRACTITIONER

## 2023-11-08 PROCEDURE — 3074F SYST BP LT 130 MM HG: CPT | Performed by: NURSE PRACTITIONER

## 2023-11-08 PROCEDURE — 1160F RVW MEDS BY RX/DR IN RCRD: CPT | Performed by: NURSE PRACTITIONER

## 2023-11-08 PROCEDURE — 3078F DIAST BP <80 MM HG: CPT | Performed by: NURSE PRACTITIONER

## 2023-11-08 PROCEDURE — 99214 OFFICE O/P EST MOD 30 MIN: CPT | Performed by: NURSE PRACTITIONER

## 2023-11-08 RX ORDER — TORSEMIDE 10 MG/1
10 TABLET ORAL DAILY
Qty: 30 TABLET | Refills: 11 | Status: SHIPPED | OUTPATIENT
Start: 2023-11-08 | End: 2024-03-18 | Stop reason: HOSPADM

## 2023-11-08 ASSESSMENT — ENCOUNTER SYMPTOMS
ORTHOPNEA: 1
CHILLS: 0
FATIGUE: 1
APPETITE LEVEL: GOOD
DYSPNEA ACTIVITY LEVEL: AT REST
DESCRIPTION OF MEMORY LOSS: SHORT TERM
SHORTNESS OF BREATH: 1
SORE THROAT: 0
FATIGUES EASILY: 1
CHANGE IN APPETITE: UNCHANGED
VOMITING: 0
SHORTNESS OF BREATH: 1
CONSTIPATION: 0
FATIGUE: 1
COUGH: 0
LOWER EXTREMITY EDEMA: 1
FORGETFULNESS: 1
PALPITATIONS: 0
NAUSEA: 0
DYSPNEA ON EXERTION: 1
STOOL FREQUENCY: DAILY
DIZZINESS: 0
ABDOMINAL PAIN: 0
ROS GI COMMENTS: GENERALIZED EDEMA
WEAKNESS: 0
FEVER: 0
WHEEZING: 1
NUMBNESS: 0
PSYCHIATRIC NEGATIVE: 1
HEADACHES: 0
DIARRHEA: 0
MUSCULOSKELETAL NEGATIVE: 1

## 2023-11-08 ASSESSMENT — ACTIVITIES OF DAILY LIVING (ADL)
AMBULATION ASSISTANCE: STAND BY ASSIST
AMBULATION ASSISTANCE: 1
OASIS_M1830: 05
BATHING ASSESSED: 1
BATHING_CURRENT_FUNCTION: STAND BY ASSIST

## 2023-11-08 NOTE — PROGRESS NOTES
"Subjective   Patient ID: Chris Wu is a 68 y.o. male who presents for follow up from last week.    HPI   Here today with / .  Took Lasix for 4 days and did feel better, however he is getting leg cramps and stopped.  Has been sleeping better with the Lasix.  Need to schedule echo  He is up 4 pounds today.  O2 at 4 L, desats very quickly with ambulattion.      Review of Systems   Constitutional:  Positive for fatigue. Negative for chills and fever.   HENT:  Negative for congestion, ear pain and sore throat.    Eyes:  Negative for visual disturbance.   Respiratory:  Positive for shortness of breath and wheezing. Negative for cough.    Cardiovascular:  Positive for leg swelling. Negative for chest pain and palpitations.   Gastrointestinal:  Negative for abdominal pain, constipation, diarrhea, nausea and vomiting.        Generalized edema   Genitourinary: Negative.    Musculoskeletal: Negative.    Skin:  Negative for rash.   Neurological:  Negative for dizziness, weakness, numbness and headaches.   Psychiatric/Behavioral: Negative.         Objective   /73   Pulse (!) 122   Ht 1.778 m (5' 10\")   Wt 78.9 kg (174 lb)   SpO2 (!) 89%   BMI 24.97 kg/m²     Physical Exam  Constitutional:       General: He is not in acute distress.     Appearance: Normal appearance.   HENT:      Head: Normocephalic and atraumatic.      Right Ear: Tympanic membrane, ear canal and external ear normal.      Left Ear: Tympanic membrane, ear canal and external ear normal.      Nose: Nose normal.      Mouth/Throat:      Mouth: Mucous membranes are moist.      Pharynx: Oropharynx is clear.   Eyes:      Extraocular Movements: Extraocular movements intact.      Conjunctiva/sclera: Conjunctivae normal.      Pupils: Pupils are equal, round, and reactive to light.   Cardiovascular:      Rate and Rhythm: Regular rhythm. Tachycardia present.      Pulses: Normal pulses.      Heart sounds: Normal heart sounds. No " murmur heard.     Comments: Generalized edema.  Pulmonary:      Effort: Pulmonary effort is normal. Tachypnea present.      Breath sounds: Examination of the right-lower field reveals wheezing. Decreased breath sounds and wheezing present. No rhonchi or rales.   Abdominal:      General: Bowel sounds are normal.      Palpations: Abdomen is soft.      Tenderness: There is no abdominal tenderness.   Musculoskeletal:         General: Normal range of motion.      Cervical back: Normal range of motion and neck supple.      Right lower le+ Pitting Edema present.      Left lower le+ Pitting Edema present.   Lymphadenopathy:      Comments: No lymphadenopathy noted   Skin:     General: Skin is warm and dry.      Findings: No rash.   Neurological:      General: No focal deficit present.      Mental Status: He is alert and oriented to person, place, and time.      Cranial Nerves: No cranial nerve deficit.      Coordination: Coordination normal.      Gait: Gait normal.   Psychiatric:         Mood and Affect: Mood normal.         Behavior: Behavior normal.         Assessment/Plan   Problem List Items Addressed This Visit             ICD-10-CM    COPD exacerbation (CMS/[facility]) - Primary J44.1    Relevant Medications    torsemide (Demadex) 10 mg tablet    Other Relevant Orders    Comprehensive Metabolic Panel    Lipid Panel    CBC and Auto Differential     Other Visit Diagnoses         Codes    Essential (primary) hypertension     I10    Relevant Orders    Lipid Panel        Get labs as orderedContinue potassium while on diuretic.  Will work on getting echo ordered    Felt dizzy and weak when he went to leave office.  Pulse ox down to 83%.  Refuses to go to the hospital.  Let him sit to recover.  Wheelchair out to car with .   Follow up in 1 week.

## 2023-11-10 ENCOUNTER — HOME CARE VISIT (OUTPATIENT)
Dept: HOME HEALTH SERVICES | Facility: HOME HEALTH | Age: 68
End: 2023-11-10
Payer: MEDICARE

## 2023-11-10 VITALS — HEART RATE: 123 BPM | OXYGEN SATURATION: 91 % | SYSTOLIC BLOOD PRESSURE: 110 MMHG | DIASTOLIC BLOOD PRESSURE: 60 MMHG

## 2023-11-10 PROCEDURE — G0152 HHCP-SERV OF OT,EA 15 MIN: HCPCS

## 2023-11-10 SDOH — HEALTH STABILITY: MENTAL HEALTH: SMOKING IN HOME: 0

## 2023-11-10 SDOH — ECONOMIC STABILITY: HOUSING INSECURITY: EVIDENCE OF SMOKING MATERIAL: 1

## 2023-11-10 ASSESSMENT — ACTIVITIES OF DAILY LIVING (ADL)
TOILETING: 1
CURRENT_FUNCTION: SUPERVISION
AMBULATION ASSISTANCE: SUPERVISION
AMBULATION ASSISTANCE: 1
LAUNDRY: DEPENDENT
LIGHT HOUSEKEEPING: NEEDS ASSISTANCE
SHOPPING: NEEDS ASSISTANCE
BATHING ASSESSED: 1
LAUNDRY ASSESSED: 1
BATHING_CURRENT_FUNCTION: SUPERVISION
TOILETING: SUPERVISION
DRESSING_LB_CURRENT_FUNCTION: SUPERVISION
SHOPPING ASSESSED: 1
PREPARING MEALS: NEEDS ASSISTANCE
GROOMING_CURRENT_FUNCTION: SUPERVISION
DRESSING_UB_CURRENT_FUNCTION: INDEPENDENT
PHYSICAL TRANSFERS ASSESSED: 1
HOUSEKEEPING ASSESSED: 1
GROOMING ASSESSED: 1

## 2023-11-10 ASSESSMENT — ENCOUNTER SYMPTOMS
PAIN LOCATION: BACK
PAIN LOCATION - PAIN FREQUENCY: FREQUENT
PAIN LOCATION - PAIN SEVERITY: 8/10
PAIN: 1
PAIN SEVERITY GOAL: 0/10
HIGHEST PAIN SEVERITY IN PAST 24 HOURS: 8/10
LOWEST PAIN SEVERITY IN PAST 24 HOURS: 2/10
SUBJECTIVE PAIN PROGRESSION: WAXING AND WANING
OCCASIONAL FEELINGS OF UNSTEADINESS: 0
PAIN LOCATION - RELIEVING FACTORS: REST, PAIN MEDS
PERSON REPORTING PAIN: PATIENT
PAIN LOCATION - EXACERBATING FACTORS: LIFTING

## 2023-11-15 ENCOUNTER — HOME CARE VISIT (OUTPATIENT)
Dept: HOME HEALTH SERVICES | Facility: HOME HEALTH | Age: 68
End: 2023-11-15
Payer: MEDICARE

## 2023-11-15 VITALS
SYSTOLIC BLOOD PRESSURE: 110 MMHG | HEART RATE: 80 BPM | WEIGHT: 172 LBS | RESPIRATION RATE: 20 BRPM | DIASTOLIC BLOOD PRESSURE: 80 MMHG | TEMPERATURE: 97 F | OXYGEN SATURATION: 96 % | BODY MASS INDEX: 24.68 KG/M2

## 2023-11-15 VITALS — HEART RATE: 93 BPM | OXYGEN SATURATION: 95 %

## 2023-11-15 PROCEDURE — G0152 HHCP-SERV OF OT,EA 15 MIN: HCPCS

## 2023-11-15 PROCEDURE — G0299 HHS/HOSPICE OF RN EA 15 MIN: HCPCS

## 2023-11-15 SDOH — HEALTH STABILITY: MENTAL HEALTH: SMOKING IN HOME: 1

## 2023-11-15 SDOH — ECONOMIC STABILITY: HOUSING INSECURITY: EVIDENCE OF SMOKING MATERIAL: 1

## 2023-11-15 ASSESSMENT — ENCOUNTER SYMPTOMS
LAST BOWEL MOVEMENT: 66792
STOOL FREQUENCY: LESS THAN DAILY
FATIGUES EASILY: 1
PERSON REPORTING PAIN: PATIENT
PAIN: 1
PAIN LOCATION - PAIN SEVERITY: 8/10
OCCASIONAL FEELINGS OF UNSTEADINESS: 0
APPETITE LEVEL: GOOD
SUBJECTIVE PAIN PROGRESSION: WAXING AND WANING
PAIN LOCATION - PAIN QUALITY: DULL
SHORTNESS OF BREATH: 1
HIGHEST PAIN SEVERITY IN PAST 24 HOURS: 8/10
PAIN SEVERITY GOAL: 4/10
LOWEST PAIN SEVERITY IN PAST 24 HOURS: 5/10
PAIN LOCATION: BACK
DYSPNEA ON EXERTION: 1
CHANGE IN APPETITE: UNCHANGED
CONSTIPATION: 1

## 2023-11-15 ASSESSMENT — PAIN DESCRIPTION - PAIN TYPE: TYPE: CHRONIC PAIN

## 2023-11-15 ASSESSMENT — ACTIVITIES OF DAILY LIVING (ADL)
AMBULATION ASSISTANCE: SUPERVISION
AMBULATION ASSISTANCE: 1
ADLS_COMMENTS: AMBULATES WITH CANE

## 2023-11-16 ENCOUNTER — TELEMEDICINE (OUTPATIENT)
Dept: PRIMARY CARE | Facility: CLINIC | Age: 68
End: 2023-11-16
Payer: MEDICARE

## 2023-11-16 DIAGNOSIS — I50.33 CHF (CONGESTIVE HEART FAILURE), NYHA CLASS III, ACUTE ON CHRONIC, DIASTOLIC (MULTI): Primary | ICD-10-CM

## 2023-11-16 DIAGNOSIS — J96.11 CHRONIC RESPIRATORY FAILURE WITH HYPOXIA (MULTI): ICD-10-CM

## 2023-11-16 PROCEDURE — 99442 PR PHYS/QHP TELEPHONE EVALUATION 11-20 MIN: CPT | Performed by: NURSE PRACTITIONER

## 2023-11-16 ASSESSMENT — ENCOUNTER SYMPTOMS
SHORTNESS OF BREATH: 1
NECK PAIN: 1
MYALGIAS: 1
DIZZINESS: 0
VOMITING: 0
SORE THROAT: 0
COUGH: 0
HEADACHES: 0
CONSTIPATION: 0
PSYCHIATRIC NEGATIVE: 1
NAUSEA: 0
NUMBNESS: 0
PALPITATIONS: 0
ABDOMINAL PAIN: 0
FEVER: 0
DIARRHEA: 0
ARTHRALGIAS: 1
FATIGUE: 0
WEAKNESS: 0
CHILLS: 0

## 2023-11-16 NOTE — PROGRESS NOTES
Subjective   Patient ID: Chris Wu is a 68 y.o. male who presents on a telephone visit for follow up.      HPI   Energy is better. Can stand up without feeling like he is going to pass out. He is with his , Will, states he looks better.  He has lost 4 pounds.  Swelling has gone down some.  Home care is coming in.  Physical therapy is coming as well.  Just ate eggs, toast and sausage.     States he is always cold.  Mood is good.  Sleeping well.  Neck is sore today.  Will put some warm heat on it.  Still needs to schedule echo.  Gave  the number.  Sounds really good on the phone. No conversational dyspnea  Taking medications as prescribed   Denies chest pain.    Review of Systems   Constitutional:  Negative for chills, fatigue and fever.   HENT:  Negative for congestion, ear pain and sore throat.    Eyes:  Negative for visual disturbance.   Respiratory:  Positive for shortness of breath. Negative for cough.    Cardiovascular:  Positive for leg swelling. Negative for chest pain and palpitations.   Gastrointestinal:  Negative for abdominal pain, constipation, diarrhea, nausea and vomiting.   Genitourinary: Negative.    Musculoskeletal:  Positive for arthralgias, myalgias and neck pain.   Skin:  Negative for rash.   Neurological:  Negative for dizziness, weakness, numbness and headaches.   Psychiatric/Behavioral: Negative.         Objective   There were no vitals taken for this visit.    Physical Exam    Assessment/Plan   Problem List Items Addressed This Visit             ICD-10-CM    Chronic respiratory failure with hypoxia (CMS/[facility]) J96.11     Continuous home oxygen as needed         CHF (congestive heart failure), NYHA class III, acute on chronic, diastolic (CMS/[facility]) - Primary I50.33     Continue torsemide as prescribed.  Continue Potassium daily.  Record daily weight.  Call with weight gain greater than 5 lbs in 3 days.  Will check BMP at follow up visit after echocardiogram.           Follow up visit to be scheduled after echocardiogram.

## 2023-11-16 NOTE — ASSESSMENT & PLAN NOTE
Continue torsemide as prescribed.  Continue Potassium daily.  Record daily weight.  Call with weight gain greater than 5 lbs in 3 days.  Will check BMP at follow up visit after echocardiogram.

## 2023-11-20 ENCOUNTER — HOME CARE VISIT (OUTPATIENT)
Dept: HOME HEALTH SERVICES | Facility: HOME HEALTH | Age: 68
End: 2023-11-20
Payer: MEDICARE

## 2023-11-20 VITALS
DIASTOLIC BLOOD PRESSURE: 78 MMHG | WEIGHT: 175 LBS | BODY MASS INDEX: 25.11 KG/M2 | HEART RATE: 92 BPM | SYSTOLIC BLOOD PRESSURE: 118 MMHG | RESPIRATION RATE: 24 BRPM | TEMPERATURE: 97.1 F | OXYGEN SATURATION: 94 %

## 2023-11-20 PROCEDURE — G0299 HHS/HOSPICE OF RN EA 15 MIN: HCPCS

## 2023-11-20 SDOH — ECONOMIC STABILITY: HOUSING INSECURITY: EVIDENCE OF SMOKING MATERIAL: 1

## 2023-11-20 SDOH — HEALTH STABILITY: MENTAL HEALTH: SMOKING IN HOME: 1

## 2023-11-20 ASSESSMENT — ENCOUNTER SYMPTOMS
PAIN SEVERITY GOAL: 4/10
ARTHRALGIAS: 1
CHANGE IN APPETITE: UNCHANGED
PAIN LOCATION - PAIN FREQUENCY: CONSTANT
STOOL FREQUENCY: LESS THAN DAILY
PAIN LOCATION - PAIN QUALITY: DULL
PAIN LOCATION - PAIN FREQUENCY: CONSTANT
ORTHOPNEA: 1
SUBJECTIVE PAIN PROGRESSION: WAXING AND WANING
PAIN LOCATION: NECK
HIGHEST PAIN SEVERITY IN PAST 24 HOURS: 8/10
COUGH: 1
PAIN LOCATION - PAIN SEVERITY: 8/10
PAIN: 1
DYSPNEA ACTIVITY LEVEL: AT REST
COUGH CHARACTERISTICS: PRODUCTIVE
DESCRIPTION OF MEMORY LOSS: SHORT TERM
PERSON REPORTING PAIN: PATIENT
FATIGUES EASILY: 1
PAIN LOCATION: BACK
APPETITE LEVEL: GOOD
PAIN LOCATION - PAIN QUALITY: DULL
LOWEST PAIN SEVERITY IN PAST 24 HOURS: 5/10
PAIN LOCATION - PAIN SEVERITY: 8/10
SHORTNESS OF BREATH: 1
DYSPNEA ON EXERTION: 1

## 2023-11-20 ASSESSMENT — ACTIVITIES OF DAILY LIVING (ADL)
AMBULATION ASSISTANCE: STAND BY ASSIST
AMBULATION ASSISTANCE: 1

## 2023-11-21 ENCOUNTER — HOME CARE VISIT (OUTPATIENT)
Dept: HOME HEALTH SERVICES | Facility: HOME HEALTH | Age: 68
End: 2023-11-21
Payer: MEDICARE

## 2023-11-21 VITALS
DIASTOLIC BLOOD PRESSURE: 70 MMHG | SYSTOLIC BLOOD PRESSURE: 120 MMHG | HEART RATE: 92 BPM | OXYGEN SATURATION: 92 % | RESPIRATION RATE: 20 BRPM

## 2023-11-21 PROCEDURE — G0152 HHCP-SERV OF OT,EA 15 MIN: HCPCS

## 2023-11-21 ASSESSMENT — PAIN DESCRIPTION - PAIN TYPE: TYPE: CHRONIC PAIN

## 2023-11-21 ASSESSMENT — ACTIVITIES OF DAILY LIVING (ADL): ENTERING_EXITING_HOME: STAND BY ASSIST

## 2023-11-22 PROCEDURE — G0180 MD CERTIFICATION HHA PATIENT: HCPCS | Performed by: NURSE PRACTITIONER

## 2023-11-28 ENCOUNTER — HOME CARE VISIT (OUTPATIENT)
Dept: HOME HEALTH SERVICES | Facility: HOME HEALTH | Age: 68
End: 2023-11-28
Payer: MEDICARE

## 2023-11-28 VITALS — SYSTOLIC BLOOD PRESSURE: 120 MMHG | OXYGEN SATURATION: 94 % | DIASTOLIC BLOOD PRESSURE: 70 MMHG | HEART RATE: 126 BPM

## 2023-11-28 DIAGNOSIS — G20.A1 PARKINSON'S DISEASE (MULTI): ICD-10-CM

## 2023-11-28 PROCEDURE — G0152 HHCP-SERV OF OT,EA 15 MIN: HCPCS

## 2023-11-28 RX ORDER — CARBIDOPA AND LEVODOPA 25; 100 MG/1; MG/1
TABLET ORAL
Qty: 84 TABLET | Refills: 1 | Status: SHIPPED | OUTPATIENT
Start: 2023-11-28 | End: 2024-01-23

## 2023-11-28 ASSESSMENT — ENCOUNTER SYMPTOMS
SUBJECTIVE PAIN PROGRESSION: UNCHANGED
PAIN LOCATION - PAIN SEVERITY: 8/10
PAIN LOCATION - PAIN FREQUENCY: INTERMITTENT
HIGHEST PAIN SEVERITY IN PAST 24 HOURS: 8/10
LOWEST PAIN SEVERITY IN PAST 24 HOURS: 2/10
PAIN LOCATION - EXACERBATING FACTORS: MOVEMENT
PAIN: 1
PAIN LOCATION - PAIN QUALITY: ACHING
PAIN LOCATION: BACK
PAIN LOCATION - RELIEVING FACTORS: MEDS, HEAT
PAIN SEVERITY GOAL: 0/10

## 2023-11-30 ENCOUNTER — HOSPITAL ENCOUNTER (OUTPATIENT)
Dept: CARDIOLOGY | Facility: HOSPITAL | Age: 68
Discharge: HOME | End: 2023-11-30
Payer: MEDICARE

## 2023-11-30 ENCOUNTER — HOME CARE VISIT (OUTPATIENT)
Dept: HOME HEALTH SERVICES | Facility: HOME HEALTH | Age: 68
End: 2023-11-30
Payer: MEDICARE

## 2023-11-30 DIAGNOSIS — I50.33 CHF (CONGESTIVE HEART FAILURE), NYHA CLASS III, ACUTE ON CHRONIC, DIASTOLIC (MULTI): ICD-10-CM

## 2023-11-30 DIAGNOSIS — I50.9 HEART FAILURE, UNSPECIFIED (MULTI): ICD-10-CM

## 2023-11-30 DIAGNOSIS — Z91.89 AT RISK FOR FLUID VOLUME OVERLOAD: ICD-10-CM

## 2023-11-30 DIAGNOSIS — J96.11 CHRONIC RESPIRATORY FAILURE WITH HYPOXIA (MULTI): ICD-10-CM

## 2023-11-30 PROCEDURE — 93306 TTE W/DOPPLER COMPLETE: CPT

## 2023-11-30 PROCEDURE — 93306 TTE W/DOPPLER COMPLETE: CPT | Performed by: INTERNAL MEDICINE

## 2023-12-04 LAB
AORTIC VALVE MEAN GRADIENT: 6
AORTIC VALVE PEAK VELOCITY: 1.67
AV PEAK GRADIENT: 11.2
AVA (PEAK VEL): 2.54
AVA (VTI): 2.51
EJECTION FRACTION APICAL 4 CHAMBER: 52.8
EJECTION FRACTION: 53
LEFT ATRIUM VOLUME AREA LENGTH INDEX BSA: 17.7
LEFT VENTRICLE INTERNAL DIMENSION DIASTOLE: 4.75 (ref 3.5–6)
LEFT VENTRICULAR OUTFLOW TRACT DIAMETER: 2
MITRAL VALVE E/A RATIO: 0.72
MITRAL VALVE E/E' RATIO: 4.93
RIGHT VENTRICLE FREE WALL PEAK S': 14.3
RIGHT VENTRICLE PEAK SYSTOLIC PRESSURE: 25.4
TRICUSPID ANNULAR PLANE SYSTOLIC EXCURSION: 2

## 2023-12-05 ENCOUNTER — HOME CARE VISIT (OUTPATIENT)
Dept: HOME HEALTH SERVICES | Facility: HOME HEALTH | Age: 68
End: 2023-12-05
Payer: MEDICARE

## 2023-12-05 VITALS
TEMPERATURE: 98 F | RESPIRATION RATE: 18 BRPM | HEART RATE: 120 BPM | DIASTOLIC BLOOD PRESSURE: 80 MMHG | OXYGEN SATURATION: 98 % | SYSTOLIC BLOOD PRESSURE: 128 MMHG

## 2023-12-05 PROCEDURE — G0300 HHS/HOSPICE OF LPN EA 15 MIN: HCPCS

## 2023-12-05 ASSESSMENT — ENCOUNTER SYMPTOMS
APPETITE LEVEL: GOOD
PAIN SEVERITY GOAL: 0/10
PAIN LOCATION: BACK
LOWEST PAIN SEVERITY IN PAST 24 HOURS: 6/10
PAIN LOCATION - PAIN SEVERITY: 8/10
HIGHEST PAIN SEVERITY IN PAST 24 HOURS: 8/10
LAST BOWEL MOVEMENT: 66812

## 2023-12-09 ENCOUNTER — HOME CARE VISIT (OUTPATIENT)
Dept: HOME HEALTH SERVICES | Facility: HOME HEALTH | Age: 68
End: 2023-12-09
Payer: MEDICARE

## 2023-12-09 VITALS
RESPIRATION RATE: 18 BRPM | HEART RATE: 92 BPM | OXYGEN SATURATION: 95 % | TEMPERATURE: 97.2 F | DIASTOLIC BLOOD PRESSURE: 78 MMHG | SYSTOLIC BLOOD PRESSURE: 144 MMHG

## 2023-12-09 DIAGNOSIS — M79.89 LEG SWELLING: Primary | ICD-10-CM

## 2023-12-09 PROCEDURE — 0023 HH SOC

## 2023-12-09 PROCEDURE — G0300 HHS/HOSPICE OF LPN EA 15 MIN: HCPCS

## 2023-12-09 RX ORDER — TORSEMIDE 20 MG/1
20 TABLET ORAL DAILY
Qty: 14 TABLET | Refills: 0 | Status: ON HOLD | OUTPATIENT
Start: 2023-12-09 | End: 2024-03-04 | Stop reason: SDUPTHER

## 2023-12-09 SDOH — HEALTH STABILITY: MENTAL HEALTH: SMOKING IN HOME: 1

## 2023-12-09 SDOH — ECONOMIC STABILITY: HOUSING INSECURITY: EVIDENCE OF SMOKING MATERIAL: 1

## 2023-12-09 ASSESSMENT — ENCOUNTER SYMPTOMS
DENIES PAIN: 1
ORTHOPNEA: 1
CHANGE IN APPETITE: UNCHANGED
LOWER EXTREMITY EDEMA: 1
STOOL FREQUENCY: LESS THAN DAILY
FATIGUES EASILY: 1
APPETITE LEVEL: GOOD
SHORTNESS OF BREATH: 1
DYSPNEA ON EXERTION: 1

## 2023-12-09 ASSESSMENT — PAIN SCALES - PAIN ASSESSMENT IN ADVANCED DEMENTIA (PAINAD)
FACIALEXPRESSION: 0
BODYLANGUAGE: 0 - RELAXED.
FACIALEXPRESSION: 0 - SMILING OR INEXPRESSIVE.
BODYLANGUAGE: 0
TOTALSCORE: 0
CONSOLABILITY: 0 - NO NEED TO CONSOLE.
BREATHING: 0
CONSOLABILITY: 0
NEGVOCALIZATION: 0 - NONE.
NEGVOCALIZATION: 0

## 2023-12-14 ENCOUNTER — APPOINTMENT (OUTPATIENT)
Dept: PRIMARY CARE | Facility: CLINIC | Age: 68
End: 2023-12-14
Payer: MEDICARE

## 2023-12-16 ENCOUNTER — HOME CARE VISIT (OUTPATIENT)
Dept: HOME HEALTH SERVICES | Facility: HOME HEALTH | Age: 68
End: 2023-12-16
Payer: MEDICARE

## 2023-12-16 VITALS
OXYGEN SATURATION: 92 % | DIASTOLIC BLOOD PRESSURE: 68 MMHG | HEART RATE: 72 BPM | RESPIRATION RATE: 18 BRPM | TEMPERATURE: 98.3 F | SYSTOLIC BLOOD PRESSURE: 120 MMHG

## 2023-12-16 PROCEDURE — G0300 HHS/HOSPICE OF LPN EA 15 MIN: HCPCS

## 2023-12-16 ASSESSMENT — ENCOUNTER SYMPTOMS
APPETITE LEVEL: GOOD
LOWEST PAIN SEVERITY IN PAST 24 HOURS: 8/10
PAIN SEVERITY GOAL: 0/10
HIGHEST PAIN SEVERITY IN PAST 24 HOURS: 10/10
PAIN LOCATION - PAIN SEVERITY: 10/10
LAST BOWEL MOVEMENT: 66823
PAIN LOCATION: NECK
PAIN: 1
SHORTNESS OF BREATH: 1

## 2023-12-20 ENCOUNTER — HOME CARE VISIT (OUTPATIENT)
Dept: HOME HEALTH SERVICES | Facility: HOME HEALTH | Age: 68
End: 2023-12-20
Payer: MEDICARE

## 2023-12-20 VITALS
SYSTOLIC BLOOD PRESSURE: 142 MMHG | DIASTOLIC BLOOD PRESSURE: 80 MMHG | TEMPERATURE: 98 F | OXYGEN SATURATION: 96 % | HEART RATE: 98 BPM | RESPIRATION RATE: 18 BRPM

## 2023-12-20 PROCEDURE — G0300 HHS/HOSPICE OF LPN EA 15 MIN: HCPCS

## 2023-12-20 ASSESSMENT — ENCOUNTER SYMPTOMS
PAIN LOCATION: NECK
LAST BOWEL MOVEMENT: 66827
APPETITE LEVEL: GOOD
HIGHEST PAIN SEVERITY IN PAST 24 HOURS: 8/10
LOWEST PAIN SEVERITY IN PAST 24 HOURS: 6/10
PAIN LOCATION - PAIN SEVERITY: 8/10
PAIN: 1

## 2023-12-21 DIAGNOSIS — J44.9 COPD, VERY SEVERE (MULTI): ICD-10-CM

## 2023-12-21 DIAGNOSIS — M54.40 CHRONIC LOW BACK PAIN WITH SCIATICA, SCIATICA LATERALITY UNSPECIFIED, UNSPECIFIED BACK PAIN LATERALITY: ICD-10-CM

## 2023-12-21 DIAGNOSIS — G89.29 CHRONIC LOW BACK PAIN WITH SCIATICA, SCIATICA LATERALITY UNSPECIFIED, UNSPECIFIED BACK PAIN LATERALITY: ICD-10-CM

## 2023-12-21 RX ORDER — GABAPENTIN 600 MG/1
600 TABLET ORAL 3 TIMES DAILY
Qty: 84 TABLET | Refills: 3 | Status: SHIPPED | OUTPATIENT
Start: 2023-12-21 | End: 2024-03-18 | Stop reason: HOSPADM

## 2023-12-21 RX ORDER — ALBUTEROL SULFATE 1.25 MG/3ML
SOLUTION RESPIRATORY (INHALATION) EVERY 4 HOURS PRN
Qty: 75 ML | Refills: 2 | Status: SHIPPED | OUTPATIENT
Start: 2023-12-21 | End: 2024-03-18 | Stop reason: HOSPADM

## 2023-12-21 RX ORDER — PREDNISONE 20 MG/1
20 TABLET ORAL DAILY
Qty: 28 TABLET | Refills: 2 | Status: SHIPPED | OUTPATIENT
Start: 2023-12-21 | End: 2024-03-18 | Stop reason: HOSPADM

## 2023-12-27 ENCOUNTER — HOME CARE VISIT (OUTPATIENT)
Dept: HOME HEALTH SERVICES | Facility: HOME HEALTH | Age: 68
End: 2023-12-27
Payer: MEDICARE

## 2023-12-27 VITALS
SYSTOLIC BLOOD PRESSURE: 132 MMHG | HEART RATE: 96 BPM | OXYGEN SATURATION: 96 % | TEMPERATURE: 98.1 F | DIASTOLIC BLOOD PRESSURE: 76 MMHG

## 2023-12-27 PROCEDURE — G0300 HHS/HOSPICE OF LPN EA 15 MIN: HCPCS

## 2023-12-27 ASSESSMENT — ENCOUNTER SYMPTOMS
PAIN LOCATION: RIGHT SHOULDER
PAIN SEVERITY GOAL: 0/10
LAST BOWEL MOVEMENT: 66834
PAIN LOCATION: NECK
PAIN: 1
HIGHEST PAIN SEVERITY IN PAST 24 HOURS: 8/10
LOWEST PAIN SEVERITY IN PAST 24 HOURS: 5/10
APPETITE LEVEL: GOOD
SHORTNESS OF BREATH: 1

## 2024-01-02 ENCOUNTER — HOME CARE VISIT (OUTPATIENT)
Dept: HOME HEALTH SERVICES | Facility: HOME HEALTH | Age: 69
End: 2024-01-02
Payer: MEDICARE

## 2024-01-02 VITALS
SYSTOLIC BLOOD PRESSURE: 130 MMHG | BODY MASS INDEX: 24.24 KG/M2 | HEART RATE: 100 BPM | HEIGHT: 72 IN | RESPIRATION RATE: 20 BRPM | TEMPERATURE: 98.3 F | WEIGHT: 179 LBS | OXYGEN SATURATION: 95 % | DIASTOLIC BLOOD PRESSURE: 70 MMHG

## 2024-01-02 PROCEDURE — 1090000001 HH PPS REVENUE CREDIT

## 2024-01-02 PROCEDURE — G0299 HHS/HOSPICE OF RN EA 15 MIN: HCPCS

## 2024-01-02 PROCEDURE — 0023 HH SOC

## 2024-01-02 PROCEDURE — 1090000002 HH PPS REVENUE DEBIT

## 2024-01-02 ASSESSMENT — ENCOUNTER SYMPTOMS
PAIN LOCATION: BACK
PAIN LOCATION - EXACERBATING FACTORS: WALKING
HIGHEST PAIN SEVERITY IN PAST 24 HOURS: 8/10
PAIN: 1
SUBJECTIVE PAIN PROGRESSION: UNCHANGED
PAIN SEVERITY GOAL: 4/10
DESCRIPTION OF MEMORY LOSS: SHORT TERM
STOOL FREQUENCY: LESS THAN DAILY
PAIN LOCATION - PAIN QUALITY: SHARP
PAIN LOCATION - PAIN FREQUENCY: CONSTANT
PAIN LOCATION: NECK
DYSPNEA ACTIVITY LEVEL: AT REST
DYSPNEA ON EXERTION: 1
PAIN LOCATION - PAIN SEVERITY: 8/10
PAIN LOCATION - EXACERBATING FACTORS: WALKING
PAIN LOCATION - PAIN QUALITY: SHARP
LAST BOWEL MOVEMENT: 66841
SHORTNESS OF BREATH: 1
APPETITE LEVEL: GOOD
PAIN LOCATION - PAIN SEVERITY: 8/10
PAIN LOCATION - RELIEVING FACTORS: MEDS
CONSTIPATION: 1
PAIN LOCATION - PAIN FREQUENCY: CONSTANT
LOWEST PAIN SEVERITY IN PAST 24 HOURS: 8/10
CHANGE IN APPETITE: UNCHANGED
PAIN LOCATION - RELIEVING FACTORS: MEDS
PERSON REPORTING PAIN: PATIENT

## 2024-01-03 PROCEDURE — 1090000002 HH PPS REVENUE DEBIT

## 2024-01-03 PROCEDURE — 1090000001 HH PPS REVENUE CREDIT

## 2024-01-04 PROCEDURE — 1090000002 HH PPS REVENUE DEBIT

## 2024-01-04 PROCEDURE — 1090000001 HH PPS REVENUE CREDIT

## 2024-01-04 ASSESSMENT — ENCOUNTER SYMPTOMS
DEPRESSION: 0
OCCASIONAL FEELINGS OF UNSTEADINESS: 0

## 2024-01-04 ASSESSMENT — ACTIVITIES OF DAILY LIVING (ADL)
OASIS_M1830: 05
ENTERING_EXITING_HOME: STAND BY ASSIST

## 2024-01-05 PROCEDURE — 1090000002 HH PPS REVENUE DEBIT

## 2024-01-05 PROCEDURE — 1090000001 HH PPS REVENUE CREDIT

## 2024-01-05 NOTE — CASE COMMUNICATION
60 day summary  patient alert and oriented afebrile pulse 90 to 120 resp 20 to 24 on 4 l oxygen pulse ox 95 % or better   bp 110 to 130 over 60 to 80 lung sounds diminished through out. dyspnea with minimal exertion  edema down in lower extremities with recent changes in diuretics wt 179 lbs  appetite adequate   chronic pain and anxiety level remains high some management with current medsambulates with walker  very limited activity natanaeljason josee continues to vape with oxygen instructed on risks

## 2024-01-06 PROCEDURE — 169592 NO-PAY CLAIM PROCEDURE

## 2024-01-06 PROCEDURE — 1090000002 HH PPS REVENUE DEBIT

## 2024-01-06 PROCEDURE — 1090000001 HH PPS REVENUE CREDIT

## 2024-01-07 PROCEDURE — 1090000001 HH PPS REVENUE CREDIT

## 2024-01-07 PROCEDURE — 1090000002 HH PPS REVENUE DEBIT

## 2024-01-08 PROCEDURE — 1090000001 HH PPS REVENUE CREDIT

## 2024-01-08 PROCEDURE — 1090000002 HH PPS REVENUE DEBIT

## 2024-01-09 PROCEDURE — 1090000002 HH PPS REVENUE DEBIT

## 2024-01-09 PROCEDURE — 1090000001 HH PPS REVENUE CREDIT

## 2024-01-10 ENCOUNTER — HOME CARE VISIT (OUTPATIENT)
Dept: HOME HEALTH SERVICES | Facility: HOME HEALTH | Age: 69
End: 2024-01-10
Payer: MEDICARE

## 2024-01-10 VITALS
HEART RATE: 94 BPM | DIASTOLIC BLOOD PRESSURE: 74 MMHG | SYSTOLIC BLOOD PRESSURE: 122 MMHG | RESPIRATION RATE: 18 BRPM | TEMPERATURE: 98.2 F | OXYGEN SATURATION: 95 %

## 2024-01-10 PROCEDURE — 1090000001 HH PPS REVENUE CREDIT

## 2024-01-10 PROCEDURE — 1090000002 HH PPS REVENUE DEBIT

## 2024-01-10 PROCEDURE — G0300 HHS/HOSPICE OF LPN EA 15 MIN: HCPCS | Mod: HHH

## 2024-01-10 ASSESSMENT — ENCOUNTER SYMPTOMS
LAST BOWEL MOVEMENT: 66849
LOWEST PAIN SEVERITY IN PAST 24 HOURS: 6/10
PAIN: 1
PAIN SEVERITY GOAL: 2/10
APPETITE LEVEL: GOOD
PAIN LOCATION: NECK
PAIN LOCATION - PAIN SEVERITY: 8/10
HIGHEST PAIN SEVERITY IN PAST 24 HOURS: 8/10

## 2024-01-11 PROCEDURE — 1090000001 HH PPS REVENUE CREDIT

## 2024-01-11 PROCEDURE — 1090000002 HH PPS REVENUE DEBIT

## 2024-01-12 PROCEDURE — 1090000002 HH PPS REVENUE DEBIT

## 2024-01-12 PROCEDURE — 1090000001 HH PPS REVENUE CREDIT

## 2024-01-13 PROCEDURE — 1090000001 HH PPS REVENUE CREDIT

## 2024-01-13 PROCEDURE — 1090000002 HH PPS REVENUE DEBIT

## 2024-01-14 PROCEDURE — 1090000001 HH PPS REVENUE CREDIT

## 2024-01-14 PROCEDURE — 1090000002 HH PPS REVENUE DEBIT

## 2024-01-15 PROCEDURE — 1090000002 HH PPS REVENUE DEBIT

## 2024-01-15 PROCEDURE — 1090000001 HH PPS REVENUE CREDIT

## 2024-01-16 ENCOUNTER — HOME CARE VISIT (OUTPATIENT)
Dept: HOME HEALTH SERVICES | Facility: HOME HEALTH | Age: 69
End: 2024-01-16
Payer: MEDICARE

## 2024-01-16 VITALS
OXYGEN SATURATION: 95 % | HEART RATE: 96 BPM | SYSTOLIC BLOOD PRESSURE: 122 MMHG | RESPIRATION RATE: 18 BRPM | TEMPERATURE: 97.8 F | DIASTOLIC BLOOD PRESSURE: 72 MMHG

## 2024-01-16 PROCEDURE — 1090000002 HH PPS REVENUE DEBIT

## 2024-01-16 PROCEDURE — 1090000001 HH PPS REVENUE CREDIT

## 2024-01-16 PROCEDURE — G0300 HHS/HOSPICE OF LPN EA 15 MIN: HCPCS | Mod: HHH

## 2024-01-16 SDOH — HEALTH STABILITY: MENTAL HEALTH: SMOKING IN HOME: 1

## 2024-01-16 SDOH — ECONOMIC STABILITY: HOUSING INSECURITY: EVIDENCE OF SMOKING MATERIAL: 1

## 2024-01-16 ASSESSMENT — ENCOUNTER SYMPTOMS
APPETITE LEVEL: GOOD
PAIN LOCATION: NECK
PAIN: 1
PAIN LOCATION: BACK
LAST BOWEL MOVEMENT: 66854
PAIN LOCATION - PAIN SEVERITY: 8/10
PAIN LOCATION - PAIN SEVERITY: 8/10
SHORTNESS OF BREATH: 1

## 2024-01-17 ENCOUNTER — APPOINTMENT (OUTPATIENT)
Dept: PRIMARY CARE | Facility: CLINIC | Age: 69
End: 2024-01-17
Payer: MEDICARE

## 2024-01-17 PROCEDURE — 1090000001 HH PPS REVENUE CREDIT

## 2024-01-17 PROCEDURE — 1090000002 HH PPS REVENUE DEBIT

## 2024-01-18 PROCEDURE — 1090000001 HH PPS REVENUE CREDIT

## 2024-01-18 PROCEDURE — 1090000002 HH PPS REVENUE DEBIT

## 2024-01-19 PROCEDURE — 1090000002 HH PPS REVENUE DEBIT

## 2024-01-19 PROCEDURE — 1090000001 HH PPS REVENUE CREDIT

## 2024-01-20 PROCEDURE — 1090000001 HH PPS REVENUE CREDIT

## 2024-01-20 PROCEDURE — 1090000002 HH PPS REVENUE DEBIT

## 2024-01-21 PROCEDURE — 1090000001 HH PPS REVENUE CREDIT

## 2024-01-21 PROCEDURE — 1090000002 HH PPS REVENUE DEBIT

## 2024-01-21 SDOH — ECONOMIC STABILITY: HOUSING INSECURITY: EVIDENCE OF SMOKING MATERIAL: 1

## 2024-01-21 SDOH — HEALTH STABILITY: MENTAL HEALTH: SMOKING IN HOME: 1

## 2024-01-21 ASSESSMENT — ACTIVITIES OF DAILY LIVING (ADL)
HOME_HEALTH_OASIS: 01
OASIS_M1830: 04
DRESSING_LB_CURRENT_FUNCTION: STAND BY ASSIST
AMBULATION ASSISTANCE: 1
DRESSING_UB_CURRENT_FUNCTION: STAND BY ASSIST
AMBULATION ASSISTANCE: STAND BY ASSIST

## 2024-01-21 ASSESSMENT — ENCOUNTER SYMPTOMS: ARTHRALGIAS: 1

## 2024-01-22 PROCEDURE — 1090000002 HH PPS REVENUE DEBIT

## 2024-01-22 PROCEDURE — 1090000001 HH PPS REVENUE CREDIT

## 2024-01-23 ENCOUNTER — OFFICE VISIT (OUTPATIENT)
Dept: PULMONOLOGY | Facility: CLINIC | Age: 69
End: 2024-01-23
Payer: MEDICARE

## 2024-01-23 ENCOUNTER — HOSPITAL ENCOUNTER (OUTPATIENT)
Dept: RADIOLOGY | Facility: CLINIC | Age: 69
Discharge: HOME | End: 2024-01-23
Payer: MEDICARE

## 2024-01-23 ENCOUNTER — HOSPITAL ENCOUNTER (OUTPATIENT)
Dept: RADIOLOGY | Facility: HOSPITAL | Age: 69
Discharge: HOME | End: 2024-01-23
Payer: MEDICARE

## 2024-01-23 ENCOUNTER — LAB (OUTPATIENT)
Dept: LAB | Facility: LAB | Age: 69
End: 2024-01-23
Payer: MEDICARE

## 2024-01-23 VITALS
OXYGEN SATURATION: 95 % | RESPIRATION RATE: 16 BRPM | SYSTOLIC BLOOD PRESSURE: 134 MMHG | DIASTOLIC BLOOD PRESSURE: 72 MMHG | BODY MASS INDEX: 23.73 KG/M2 | HEART RATE: 84 BPM | WEIGHT: 175 LBS

## 2024-01-23 DIAGNOSIS — J44.1 COPD EXACERBATION (MULTI): ICD-10-CM

## 2024-01-23 DIAGNOSIS — R09.02 HYPOXIA: ICD-10-CM

## 2024-01-23 DIAGNOSIS — I50.33 CHF (CONGESTIVE HEART FAILURE), NYHA CLASS III, ACUTE ON CHRONIC, DIASTOLIC (MULTI): ICD-10-CM

## 2024-01-23 DIAGNOSIS — J44.9 COPD, VERY SEVERE (MULTI): ICD-10-CM

## 2024-01-23 DIAGNOSIS — G20.A1 PARKINSON'S DISEASE (MULTI): ICD-10-CM

## 2024-01-23 DIAGNOSIS — Z87.891 EX-SMOKER: ICD-10-CM

## 2024-01-23 DIAGNOSIS — I10 ESSENTIAL (PRIMARY) HYPERTENSION: ICD-10-CM

## 2024-01-23 LAB
ALBUMIN SERPL BCP-MCNC: 4.3 G/DL (ref 3.4–5)
ALP SERPL-CCNC: 40 U/L (ref 33–136)
ALT SERPL W P-5'-P-CCNC: 8 U/L (ref 10–52)
ANION GAP SERPL CALC-SCNC: 12 MMOL/L (ref 10–20)
AST SERPL W P-5'-P-CCNC: 12 U/L (ref 9–39)
BASOPHILS # BLD AUTO: 0.07 X10*3/UL (ref 0–0.1)
BASOPHILS NFR BLD AUTO: 0.9 %
BILIRUB SERPL-MCNC: 0.5 MG/DL (ref 0–1.2)
BUN SERPL-MCNC: 8 MG/DL (ref 6–23)
CALCIUM SERPL-MCNC: 9.4 MG/DL (ref 8.6–10.3)
CHLORIDE SERPL-SCNC: 94 MMOL/L (ref 98–107)
CHOLEST SERPL-MCNC: 296 MG/DL (ref 0–199)
CHOLESTEROL/HDL RATIO: 3.1
CO2 SERPL-SCNC: 37 MMOL/L (ref 21–32)
CREAT SERPL-MCNC: 1.05 MG/DL (ref 0.5–1.3)
EGFRCR SERPLBLD CKD-EPI 2021: 78 ML/MIN/1.73M*2
EOSINOPHIL # BLD AUTO: 0.06 X10*3/UL (ref 0–0.7)
EOSINOPHIL NFR BLD AUTO: 0.8 %
ERYTHROCYTE [DISTWIDTH] IN BLOOD BY AUTOMATED COUNT: 12.4 % (ref 11.5–14.5)
GLUCOSE SERPL-MCNC: 107 MG/DL (ref 74–99)
HCT VFR BLD AUTO: 44.1 % (ref 41–52)
HDLC SERPL-MCNC: 95.9 MG/DL
HGB BLD-MCNC: 13.9 G/DL (ref 13.5–17.5)
IMM GRANULOCYTES # BLD AUTO: 0.03 X10*3/UL (ref 0–0.7)
IMM GRANULOCYTES NFR BLD AUTO: 0.4 % (ref 0–0.9)
LDLC SERPL CALC-MCNC: 176 MG/DL
LYMPHOCYTES # BLD AUTO: 0.66 X10*3/UL (ref 1.2–4.8)
LYMPHOCYTES NFR BLD AUTO: 8.6 %
MCH RBC QN AUTO: 29.6 PG (ref 26–34)
MCHC RBC AUTO-ENTMCNC: 31.5 G/DL (ref 32–36)
MCV RBC AUTO: 94 FL (ref 80–100)
MONOCYTES # BLD AUTO: 0.36 X10*3/UL (ref 0.1–1)
MONOCYTES NFR BLD AUTO: 4.7 %
NEUTROPHILS # BLD AUTO: 6.52 X10*3/UL (ref 1.2–7.7)
NEUTROPHILS NFR BLD AUTO: 84.6 %
NON HDL CHOLESTEROL: 200 MG/DL (ref 0–149)
NRBC BLD-RTO: 0 /100 WBCS (ref 0–0)
PLATELET # BLD AUTO: 179 X10*3/UL (ref 150–450)
POTASSIUM SERPL-SCNC: 4 MMOL/L (ref 3.5–5.3)
PROT SERPL-MCNC: 6.4 G/DL (ref 6.4–8.2)
RBC # BLD AUTO: 4.69 X10*6/UL (ref 4.5–5.9)
SODIUM SERPL-SCNC: 139 MMOL/L (ref 136–145)
TRIGL SERPL-MCNC: 123 MG/DL (ref 0–149)
VLDL: 25 MG/DL (ref 0–40)
WBC # BLD AUTO: 7.7 X10*3/UL (ref 4.4–11.3)

## 2024-01-23 PROCEDURE — 1090000001 HH PPS REVENUE CREDIT

## 2024-01-23 PROCEDURE — 99214 OFFICE O/P EST MOD 30 MIN: CPT | Mod: 25 | Performed by: INTERNAL MEDICINE

## 2024-01-23 PROCEDURE — 1090000002 HH PPS REVENUE DEBIT

## 2024-01-23 PROCEDURE — 99214 OFFICE O/P EST MOD 30 MIN: CPT | Performed by: INTERNAL MEDICINE

## 2024-01-23 PROCEDURE — 71046 X-RAY EXAM CHEST 2 VIEWS: CPT | Performed by: STUDENT IN AN ORGANIZED HEALTH CARE EDUCATION/TRAINING PROGRAM

## 2024-01-23 PROCEDURE — 36415 COLL VENOUS BLD VENIPUNCTURE: CPT

## 2024-01-23 PROCEDURE — 85025 COMPLETE CBC W/AUTO DIFF WBC: CPT

## 2024-01-23 PROCEDURE — 80053 COMPREHEN METABOLIC PANEL: CPT

## 2024-01-23 PROCEDURE — 1159F MED LIST DOCD IN RCRD: CPT | Performed by: INTERNAL MEDICINE

## 2024-01-23 PROCEDURE — 1160F RVW MEDS BY RX/DR IN RCRD: CPT | Performed by: INTERNAL MEDICINE

## 2024-01-23 PROCEDURE — 1126F AMNT PAIN NOTED NONE PRSNT: CPT | Performed by: INTERNAL MEDICINE

## 2024-01-23 PROCEDURE — 80061 LIPID PANEL: CPT

## 2024-01-23 PROCEDURE — 1036F TOBACCO NON-USER: CPT | Performed by: INTERNAL MEDICINE

## 2024-01-23 PROCEDURE — 71046 X-RAY EXAM CHEST 2 VIEWS: CPT

## 2024-01-23 RX ORDER — AZITHROMYCIN 250 MG/1
250 TABLET, FILM COATED ORAL DAILY
Qty: 6 TABLET | Refills: 0 | Status: SHIPPED | OUTPATIENT
Start: 2024-01-23 | End: 2024-01-25 | Stop reason: ALTCHOICE

## 2024-01-23 RX ORDER — CARBIDOPA AND LEVODOPA 25; 100 MG/1; MG/1
TABLET ORAL
Qty: 84 TABLET | Refills: 1 | Status: SHIPPED | OUTPATIENT
Start: 2024-01-23 | End: 2024-03-21 | Stop reason: SDUPTHER

## 2024-01-23 RX ORDER — PREDNISONE 20 MG/1
40 TABLET ORAL DAILY
Qty: 10 TABLET | Refills: 0 | Status: SHIPPED | OUTPATIENT
Start: 2024-01-23 | End: 2024-01-25 | Stop reason: ALTCHOICE

## 2024-01-23 RX ORDER — BUDESONIDE, GLYCOPYRROLATE, AND FORMOTEROL FUMARATE 160; 9; 4.8 UG/1; UG/1; UG/1
2 AEROSOL, METERED RESPIRATORY (INHALATION) 2 TIMES DAILY
Qty: 10.7 G | Refills: 5 | Status: SHIPPED | OUTPATIENT
Start: 2024-01-23 | End: 2024-03-21 | Stop reason: SDUPTHER

## 2024-01-23 ASSESSMENT — PATIENT HEALTH QUESTIONNAIRE - PHQ9
2. FEELING DOWN, DEPRESSED OR HOPELESS: NOT AT ALL
1. LITTLE INTEREST OR PLEASURE IN DOING THINGS: NOT AT ALL
SUM OF ALL RESPONSES TO PHQ9 QUESTIONS 1 AND 2: 0

## 2024-01-23 ASSESSMENT — PAIN SCALES - GENERAL: PAINLEVEL: 0-NO PAIN

## 2024-01-23 ASSESSMENT — ENCOUNTER SYMPTOMS: DEPRESSION: 0

## 2024-01-23 ASSESSMENT — COLUMBIA-SUICIDE SEVERITY RATING SCALE - C-SSRS
2. HAVE YOU ACTUALLY HAD ANY THOUGHTS OF KILLING YOURSELF?: NO
6. HAVE YOU EVER DONE ANYTHING, STARTED TO DO ANYTHING, OR PREPARED TO DO ANYTHING TO END YOUR LIFE?: NO

## 2024-01-23 NOTE — PROGRESS NOTES
Department of Medicine  Division of Pulmonary, Critical Care, and Sleep Medicine  Follow-Up Visit  [facility] - [address], [address]    Physician HPI (1/23/2024):  68-year-old man with history of COPD, chronic hypoxic respiratory failure on 4 L O2 presenting for follow-up.  Patient presenting with , reported episodes of dyspnea where he feels his heart is racing that last for few minutes then improved, overall since last visit his dyspnea has been progressive, coughing with white phlegm, occasional wheezing.  No fever or chills, no chest pain or hemoptysis.  All other systems reviewed and negative otherwise.    Quit smoking July 1, 2023.        There is no immunization history on file for this patient.    Current Medications:  Current Outpatient Medications   Medication Instructions    albuterol 1.25 mg/3 mL nebulizer solution nebulization, Every 4 hours PRN    albuterol 90 mcg/actuation inhaler INHALE 1-2 PUFFS EVERY 4-6 HOURS AS NEEDED AND AS DIRECTED.    Breztri Aerosphere 160-9-4.8 mcg/actuation HFA aerosol inhaler 2 puffs, inhalation, 2 times daily    buprenorphine-naloxone (Suboxone) 8-2 mg SL tablet 2 tablets, sublingual, Daily    carbidopa-levodopa (Sinemet)  mg tablet TAKE 1 TABLET BY MOUTH THREE TIMES A DAY    cholecalciferol (Vitamin D3) 1,250 mcg (50,000 unit) tablet 1 capsule    cloNIDine (CATAPRES) 0.1 mg, oral, 2 times daily    escitalopram (LEXAPRO) 20 mg, oral, Daily    gabapentin (NEURONTIN) 600 mg, oral, 3 times daily    hydrocolloid dressing (DUODERM CGF EXTRA THIN TOP) Apply to affected areas once daily    hydrOXYzine HCL (Atarax) 25 mg tablet hydrOXYzine HCl - 25 MG Oral Tablet   Quantity: 14  Refills: 0        Start : 11-Apr-2022   Active    hydrOXYzine HCL (ATARAX) 25 mg, oral, 2 times daily    hydrOXYzine pamoate (Vistaril) 25 mg capsule     ipratropium-albuteroL (Duo-Neb) 0.5-2.5 mg/3 mL nebulizer solution INHALE 1 UNIT DOSE FOUR TIMES A DAY AS NEEDED     "meloxicam (Mobic) 15 mg tablet 1 tablet, Daily PRN, With food    montelukast (Singulair) 10 mg tablet 1 tablet, oral, Daily    omeprazole (PRILOSEC) 40 mg, oral, Daily with breakfast    oxygen (O2) 4 L/min, inhalation, Continuous    potassium chloride CR (Klor-Con M20) 20 mEq ER tablet 20 mEq, oral, Daily, Do not crush or chew.    predniSONE (DELTASONE) 20 mg, oral, Daily    propranolol LA (Inderal LA) 80 mg 24 hr capsule Inderal XL 80 MG Oral Capsule Extended Release 24 Hour   Refills: 0        [id number] APRN-CNP, Tania   Start : 10-Nov-2021   Active    QUEtiapine (SEROQUEL) 25 mg, oral, 4 times daily PRN    torsemide (DEMADEX) 10 mg, oral, Daily    torsemide (DEMADEX) 10 mg, oral, Daily    torsemide (DEMADEX) 20 mg, oral, Daily    torsemide (DEMADEX) 20 mg, oral, Daily    traZODone (Desyrel) 150 mg tablet 0.5 tablets, oral, Nightly PRN        Drug Allergies/Intolerances:  Allergies   Allergen Reactions    Aspirin Other     GI upset          Visit Vitals  /72   Pulse 84   Resp 16   Wt 79.4 kg (175 lb)   SpO2 95%   BMI 23.73 kg/m²   Smoking Status Former   BSA 2.01 m²        Physical exam  Constitutional: Normal appearance.  HEENT: Normocephalic and atraumatic.  Cardiovascular: Tachycardic, regular rhythm and rate  Pulmonary: Expiratory wheeze, no rhonchi  Musculoskeletal: No edema, no cyanosis.  Neurological: Awake, alert and oriented x3.  Psychiatric: Normal behavior, mood and affect.      Pulmonary Function Test Results     Pulmonary Functions Testing Results:    No results found for: \"FEV1\", \"FVC\", \"DEZ5MQW\", \"TLC\", \"DLCO\"      Chest Radiograph     XR CHEST 1 VIEW 05/01/2022    Narrative  STUDY:  Chest Radiograph;  05/01/2022 8:37 AM    INDICATION:  Chest pain, palpitations.    COMPARISON:  XR chest 05/05/2021, 03/08/2021.    ACCESSION NUMBER(S):  [id number]    ORDERING CLINICIAN:  MADELEINE AMOR MD    TECHNIQUE:  Frontal chest was obtained at 08:33:00 hours.    FINDINGS:    CARDIOMEDIASTINAL " "SILHOUETTE:  Cardiomediastinal silhouette is normal in size and configuration.    LUNGS:  Lungs are mildly hyperinflated with relative increased interstitial  markings seen lung periphery and bases favoring chronic changes.  No  focal confluent infiltrates, pulmonary edema or pleural effusion is  seen..    ABDOMEN:  No remarkable upper abdominal findings.    BONES:  No acute osseous changes.    Impression  Mild hyperinflation and suspected chronic lung changes without focal  infiltrates, edema or effusion.      Signed by Ernesto Crawford MD      Echocardiogram     No results found for this or any previous visit from the past 365 days.       Chest CT Scan     No results found for this or any previous visit from the past 365 days.       Laboratory Studies     Lab Results   Component Value Date    WBC 11.3 05/06/2022    HGB 13.6 05/06/2022    HCT 41.9 05/06/2022    MCV 90 05/06/2022     05/06/2022      Lab Results   Component Value Date    GLUCOSE 96 05/06/2022    CALCIUM 9.2 05/06/2022     05/06/2022    K 4.2 05/06/2022    CO2 40 (H) 05/06/2022    CL 96 (L) 05/06/2022    BUN 23 05/06/2022    CREATININE 0.64 05/06/2022      Lab Results   Component Value Date    ALT 16 05/01/2022    AST 20 05/01/2022    ALKPHOS 52 05/01/2022    BILITOT 0.7 05/01/2022        Sputum Culture     No results found for: \"AFBCX\"   No results found for: \"RESPCULTCYFI\"  No results found for the last 90 days.          Assessment and Plan / Recommendations     68-year-old man with history of COPD, chronic hypoxic respiratory failure on 4 L O2 presenting for follow-up.  Patient presenting with , reported episodes of dyspnea where he feels his heart is racing.    1.  COPD with exacerbation  -Start prednisone and Z-Craig course  -Continue Breztri and albuterol  -Check CBC to rule out anemia, patient appears pale today  -Chest x-ray today  -Due for low-dose CT screening  -EKG, follow-up with PCP, will need cardiac workup if no " improvement after COPD management  -Return to clinic in 1 to 2 months or sooner with any concerns     This dictation was created using voice recognition software. Phonetic and/or minor grammatical errors may exist.    Jean Claude Saul MD   01/23/2024

## 2024-01-24 ENCOUNTER — HOME CARE VISIT (OUTPATIENT)
Dept: HOME HEALTH SERVICES | Facility: HOME HEALTH | Age: 69
End: 2024-01-24
Payer: MEDICARE

## 2024-01-24 ENCOUNTER — APPOINTMENT (OUTPATIENT)
Dept: PRIMARY CARE | Facility: CLINIC | Age: 69
End: 2024-01-24
Payer: MEDICARE

## 2024-01-24 VITALS
RESPIRATION RATE: 18 BRPM | OXYGEN SATURATION: 95 % | TEMPERATURE: 98 F | DIASTOLIC BLOOD PRESSURE: 80 MMHG | SYSTOLIC BLOOD PRESSURE: 128 MMHG | HEART RATE: 86 BPM

## 2024-01-24 PROCEDURE — 1090000002 HH PPS REVENUE DEBIT

## 2024-01-24 PROCEDURE — 1090000001 HH PPS REVENUE CREDIT

## 2024-01-24 PROCEDURE — G0300 HHS/HOSPICE OF LPN EA 15 MIN: HCPCS | Mod: HHH

## 2024-01-24 RX ORDER — BACLOFEN 10 MG/1
10 TABLET ORAL 3 TIMES DAILY PRN
Status: ON HOLD | COMMUNITY
Start: 2016-05-03 | End: 2024-03-04 | Stop reason: WASHOUT

## 2024-01-24 RX ORDER — BUDESONIDE AND FORMOTEROL FUMARATE DIHYDRATE 160; 4.5 UG/1; UG/1
AEROSOL RESPIRATORY (INHALATION) EVERY 12 HOURS
Status: ON HOLD | COMMUNITY
Start: 2021-03-06 | End: 2024-03-04

## 2024-01-24 SDOH — HEALTH STABILITY: MENTAL HEALTH: SMOKING IN HOME: 0

## 2024-01-24 SDOH — ECONOMIC STABILITY: HOUSING INSECURITY: EVIDENCE OF SMOKING MATERIAL: 0

## 2024-01-24 ASSESSMENT — ENCOUNTER SYMPTOMS
APPETITE LEVEL: GOOD
PAIN LOCATION: BACK
PAIN: 1
PAIN LOCATION - PAIN SEVERITY: 8/10
PAIN LOCATION: NECK
LAST BOWEL MOVEMENT: 66863
PAIN LOCATION - PAIN SEVERITY: 8/10

## 2024-01-25 ENCOUNTER — OFFICE VISIT (OUTPATIENT)
Dept: PRIMARY CARE | Facility: CLINIC | Age: 69
End: 2024-01-25
Payer: MEDICARE

## 2024-01-25 VITALS
HEART RATE: 130 BPM | OXYGEN SATURATION: 90 % | DIASTOLIC BLOOD PRESSURE: 88 MMHG | BODY MASS INDEX: 24.01 KG/M2 | SYSTOLIC BLOOD PRESSURE: 144 MMHG | WEIGHT: 177 LBS

## 2024-01-25 DIAGNOSIS — R00.0 TACHYCARDIA: Primary | ICD-10-CM

## 2024-01-25 DIAGNOSIS — F41.9 ANXIETY: ICD-10-CM

## 2024-01-25 DIAGNOSIS — J44.9 END STAGE CHRONIC OBSTRUCTIVE PULMONARY DISEASE (MULTI): ICD-10-CM

## 2024-01-25 PROCEDURE — 1159F MED LIST DOCD IN RCRD: CPT | Performed by: NURSE PRACTITIONER

## 2024-01-25 PROCEDURE — 1160F RVW MEDS BY RX/DR IN RCRD: CPT | Performed by: NURSE PRACTITIONER

## 2024-01-25 PROCEDURE — 1090000002 HH PPS REVENUE DEBIT

## 2024-01-25 PROCEDURE — 1090000001 HH PPS REVENUE CREDIT

## 2024-01-25 PROCEDURE — 1036F TOBACCO NON-USER: CPT | Performed by: NURSE PRACTITIONER

## 2024-01-25 PROCEDURE — 99214 OFFICE O/P EST MOD 30 MIN: CPT | Performed by: NURSE PRACTITIONER

## 2024-01-25 PROCEDURE — 1126F AMNT PAIN NOTED NONE PRSNT: CPT | Performed by: NURSE PRACTITIONER

## 2024-01-25 RX ORDER — OXAZEPAM 15 MG/1
30 CAPSULE ORAL 2 TIMES DAILY PRN
Qty: 60 CAPSULE | Refills: 0 | Status: SHIPPED | OUTPATIENT
Start: 2024-01-25 | End: 2024-02-21 | Stop reason: SDUPTHER

## 2024-01-25 RX ORDER — PROPRANOLOL HYDROCHLORIDE 160 MG/1
160 CAPSULE, EXTENDED RELEASE ORAL DAILY
Qty: 90 CAPSULE | Refills: 1 | Status: SHIPPED | OUTPATIENT
Start: 2024-01-25 | End: 2024-03-18 | Stop reason: HOSPADM

## 2024-01-25 RX ORDER — NALOXONE HYDROCHLORIDE 4 MG/.1ML
4 SPRAY NASAL AS NEEDED
Qty: 2 EACH | Refills: 0 | Status: SHIPPED | OUTPATIENT
Start: 2024-01-25

## 2024-01-25 ASSESSMENT — ENCOUNTER SYMPTOMS
PALPITATIONS: 1
CONSTIPATION: 0
NAUSEA: 0
DIARRHEA: 0
CHILLS: 0
NERVOUS/ANXIOUS: 1
VOMITING: 0
COUGH: 0
NUMBNESS: 0
ABDOMINAL PAIN: 0
SORE THROAT: 0
DIZZINESS: 0
WEAKNESS: 0
HEADACHES: 0
MUSCULOSKELETAL NEGATIVE: 1
FEVER: 0
FATIGUE: 1
SHORTNESS OF BREATH: 1

## 2024-01-25 NOTE — PROGRESS NOTES
Subjective   Patient ID: Chris Wu is a 68 y.o. male who presents for Follow-up.    HPI   Here with  today.  Just saw pulmonology and he was placed on antibiotic and prednisone.  Making patient weak and very shaky.   He is near syncopal with standing and ambulation.  Very difficult time breathing.  He is tachycardic, likely due to hypoxia.  He is really struggling. Has increased anxiety at home with difficulty breathing.  Denies chest pain,  or GI issues.        Review of Systems   Constitutional:  Positive for fatigue. Negative for chills and fever.   HENT:  Negative for congestion, ear pain and sore throat.    Eyes:  Negative for visual disturbance.   Respiratory:  Positive for shortness of breath. Negative for cough.         On continuous oxygen   Cardiovascular:  Positive for palpitations. Negative for chest pain and leg swelling.   Gastrointestinal:  Negative for abdominal pain, constipation, diarrhea, nausea and vomiting.   Genitourinary: Negative.    Musculoskeletal: Negative.    Skin:  Positive for pallor. Negative for rash.   Neurological:  Negative for dizziness, weakness, numbness and headaches.   Psychiatric/Behavioral:  The patient is nervous/anxious.        Objective   /88   Pulse (!) 130   Wt 80.3 kg (177 lb)   SpO2 90%   BMI 24.01 kg/m²     Physical Exam  Constitutional:       General: He is not in acute distress.     Appearance: He is ill-appearing.   HENT:      Head: Normocephalic and atraumatic.      Right Ear: Tympanic membrane, ear canal and external ear normal.      Left Ear: Tympanic membrane, ear canal and external ear normal.      Nose: Nose normal.      Mouth/Throat:      Mouth: Mucous membranes are moist.      Pharynx: Oropharynx is clear.   Eyes:      Extraocular Movements: Extraocular movements intact.      Conjunctiva/sclera: Conjunctivae normal.      Pupils: Pupils are equal, round, and reactive to light.   Cardiovascular:      Rate and Rhythm: Regular  rhythm. Tachycardia present.      Pulses: Normal pulses.      Heart sounds: Normal heart sounds. No murmur heard.  Pulmonary:      Effort: Respiratory distress present.      Breath sounds: No wheezing, rhonchi or rales.      Comments: Very diminished, pursed lip breathing, cyanosis with any physical effort.  Abdominal:      General: Bowel sounds are normal.      Palpations: Abdomen is soft.      Tenderness: There is no abdominal tenderness.   Musculoskeletal:         General: Normal range of motion.      Cervical back: Normal range of motion and neck supple.   Lymphadenopathy:      Comments: No lymphadenopathy noted   Skin:     General: Skin is warm and dry.      Findings: No rash.   Neurological:      General: No focal deficit present.      Mental Status: He is alert and oriented to person, place, and time.      Cranial Nerves: No cranial nerve deficit.      Coordination: Coordination normal.      Gait: Gait normal.   Psychiatric:         Mood and Affect: Mood normal.         Behavior: Behavior normal.         Assessment/Plan   Problem List Items Addressed This Visit             ICD-10-CM    Tachycardia - Primary R00.0    Relevant Medications    oxazepam (Serax) 15 mg capsule    propranolol LA (Inderal LA) 160 mg 24 hr capsule    Other Relevant Orders    Follow Up In Primary Care - Established     Other Visit Diagnoses         Codes    Anxiety     F41.9    Relevant Medications    oxazepam (Serax) 15 mg capsule    naloxone (Narcan) 4 mg/0.1 mL nasal spray    End stage chronic obstructive pulmonary disease (CMS/HCC)     J44.9    Relevant Orders    Referral to Palliative Care          Had long discussion with Haseeb regarding palliative care and how that would be beneficial at this point.  It is dangerous for him to attend any appointments given his extreme respiratory distress with the smallest effort.    He understood and agreed with Palliative care referral at this point.   here during conversation.  Will  call our office with any needs.

## 2024-01-26 PROCEDURE — 1090000001 HH PPS REVENUE CREDIT

## 2024-01-26 PROCEDURE — 1090000002 HH PPS REVENUE DEBIT

## 2024-01-27 PROCEDURE — 1090000001 HH PPS REVENUE CREDIT

## 2024-01-27 PROCEDURE — 1090000002 HH PPS REVENUE DEBIT

## 2024-01-28 PROCEDURE — 1090000002 HH PPS REVENUE DEBIT

## 2024-01-28 PROCEDURE — 1090000001 HH PPS REVENUE CREDIT

## 2024-01-29 PROCEDURE — 1090000002 HH PPS REVENUE DEBIT

## 2024-01-29 PROCEDURE — 1090000001 HH PPS REVENUE CREDIT

## 2024-01-30 PROCEDURE — 1090000001 HH PPS REVENUE CREDIT

## 2024-01-30 PROCEDURE — 1090000002 HH PPS REVENUE DEBIT

## 2024-01-31 PROCEDURE — 1090000003 HH PPS REVENUE ADJ

## 2024-01-31 PROCEDURE — 1090000001 HH PPS REVENUE CREDIT

## 2024-01-31 PROCEDURE — 1090000002 HH PPS REVENUE DEBIT

## 2024-02-01 ENCOUNTER — HOME CARE VISIT (OUTPATIENT)
Dept: HOME HEALTH SERVICES | Facility: HOME HEALTH | Age: 69
End: 2024-02-01
Payer: MEDICARE

## 2024-02-01 VITALS
RESPIRATION RATE: 18 BRPM | HEART RATE: 64 BPM | TEMPERATURE: 98.4 F | OXYGEN SATURATION: 98 % | DIASTOLIC BLOOD PRESSURE: 74 MMHG | SYSTOLIC BLOOD PRESSURE: 126 MMHG

## 2024-02-01 PROCEDURE — G0300 HHS/HOSPICE OF LPN EA 15 MIN: HCPCS | Mod: HHH

## 2024-02-01 ASSESSMENT — ENCOUNTER SYMPTOMS
DYSPNEA ACTIVITY LEVEL: AFTER AMBULATING 10 - 20 FT
PAIN SEVERITY GOAL: 0/10
PAIN: 1
SHORTNESS OF BREATH: 1
PAIN LOCATION: BACK
HIGHEST PAIN SEVERITY IN PAST 24 HOURS: 10/10
LOWEST PAIN SEVERITY IN PAST 24 HOURS: 8/10

## 2024-02-03 ENCOUNTER — HOME CARE VISIT (OUTPATIENT)
Dept: HOME HEALTH SERVICES | Facility: HOME HEALTH | Age: 69
End: 2024-02-03
Payer: MEDICARE

## 2024-02-07 ENCOUNTER — TELEPHONE (OUTPATIENT)
Dept: PRIMARY CARE | Facility: CLINIC | Age: 69
End: 2024-02-07
Payer: MEDICARE

## 2024-02-07 NOTE — TELEPHONE ENCOUNTER
----- Message from Valeria Navas MA sent at 1/29/2024  4:29 PM EST -----  Regarding: shaky  Patient lvm with concerns of feeling more shaky since last visit starting the propranolol and prednisone.  Asking for advice       MA spoke with patient and  today.  Hospice is coming to his home tomorrow.

## 2024-02-15 ENCOUNTER — TELEPHONE (OUTPATIENT)
Dept: PRIMARY CARE | Facility: CLINIC | Age: 69
End: 2024-02-15
Payer: MEDICARE

## 2024-02-15 NOTE — TELEPHONE ENCOUNTER
----- Message from Delvin Benavides MA sent at 2/15/2024 10:16 AM EST -----  Cruz  called.  The Hospice people need to speak to you about Haseeb's care.  Her name is Nitza Lee [phone]      2/15/2024 4 PM called and spoke with  for hospice for Mr. Wu.  She has gone out to see him however being a nurse practitioner she needs collaboration with primary care.  I told her I would stay on as his primary care provider.  She also said does need help with ADLs and keeping his home, cleaning, laundry etc.  Will call Department of aging for resources

## 2024-02-19 DIAGNOSIS — F41.9 ANXIETY: ICD-10-CM

## 2024-02-19 DIAGNOSIS — R00.0 TACHYCARDIA: ICD-10-CM

## 2024-02-19 DIAGNOSIS — F32.9 MAJOR DEPRESSIVE DISORDER WITH SINGLE EPISODE, REMISSION STATUS UNSPECIFIED: Primary | ICD-10-CM

## 2024-02-19 RX ORDER — OXAZEPAM 15 MG/1
30 CAPSULE ORAL 4 TIMES DAILY PRN
Qty: 90 CAPSULE | Refills: 1 | Status: CANCELLED | OUTPATIENT
Start: 2024-02-19 | End: 2024-06-18

## 2024-02-19 RX ORDER — QUETIAPINE FUMARATE 50 MG/1
50 TABLET, FILM COATED ORAL 4 TIMES DAILY PRN
Qty: 320 TABLET | Refills: 5 | Status: SHIPPED | OUTPATIENT
Start: 2024-02-19 | End: 2024-03-18 | Stop reason: HOSPADM

## 2024-02-21 DIAGNOSIS — R00.0 TACHYCARDIA: ICD-10-CM

## 2024-02-21 DIAGNOSIS — F41.9 ANXIETY: ICD-10-CM

## 2024-02-21 DIAGNOSIS — R12 HEARTBURN: ICD-10-CM

## 2024-02-21 RX ORDER — OMEPRAZOLE 40 MG/1
40 CAPSULE, DELAYED RELEASE ORAL
Qty: 28 CAPSULE | Refills: 3 | Status: SHIPPED | OUTPATIENT
Start: 2024-02-21 | End: 2024-03-18 | Stop reason: HOSPADM

## 2024-02-21 RX ORDER — OXAZEPAM 15 MG/1
30 CAPSULE ORAL 4 TIMES DAILY PRN
Qty: 120 CAPSULE | Refills: 0 | Status: ON HOLD | OUTPATIENT
Start: 2024-02-21 | End: 2024-03-04 | Stop reason: SINTOL

## 2024-02-21 NOTE — PROGRESS NOTES
I HAVE REVIEWED THE OARRS, WHICH WAS APPROPRIATE. I HAVE EVALUATED THE RISKS OF ABUSE, ADDICTION, DIVERSION, AND DEPENDENCE. PRESCRIBED MEDICATION SEEMS APPROPRIATE FOR DOCUMENTED DIAGNOSIS.  CSA up to date

## 2024-02-25 ASSESSMENT — ACTIVITIES OF DAILY LIVING (ADL)
OASIS_M1830: 05
HOME_HEALTH_OASIS: 01

## 2024-02-25 ASSESSMENT — PATIENT HEALTH QUESTIONNAIRE - PHQ9: PATIENT UNABLE TO COMPLETE PHQ: NO VISIT MADE

## 2024-03-01 ENCOUNTER — APPOINTMENT (OUTPATIENT)
Dept: CARDIOLOGY | Facility: HOSPITAL | Age: 69
DRG: 091 | End: 2024-03-01
Payer: MEDICARE

## 2024-03-01 ENCOUNTER — APPOINTMENT (OUTPATIENT)
Dept: RADIOLOGY | Facility: HOSPITAL | Age: 69
DRG: 091 | End: 2024-03-01
Payer: MEDICARE

## 2024-03-01 ENCOUNTER — HOSPITAL ENCOUNTER (INPATIENT)
Facility: HOSPITAL | Age: 69
LOS: 17 days | Discharge: HOME | DRG: 091 | End: 2024-03-18
Attending: STUDENT IN AN ORGANIZED HEALTH CARE EDUCATION/TRAINING PROGRAM | Admitting: INTERNAL MEDICINE
Payer: MEDICARE

## 2024-03-01 DIAGNOSIS — R79.89 ELEVATED D-DIMER: ICD-10-CM

## 2024-03-01 DIAGNOSIS — R06.89 HYPERCARBIA: ICD-10-CM

## 2024-03-01 DIAGNOSIS — R09.89 OTHER SPECIFIED SYMPTOMS AND SIGNS INVOLVING THE CIRCULATORY AND RESPIRATORY SYSTEMS: ICD-10-CM

## 2024-03-01 DIAGNOSIS — K21.9 GASTROESOPHAGEAL REFLUX DISEASE, UNSPECIFIED WHETHER ESOPHAGITIS PRESENT: ICD-10-CM

## 2024-03-01 DIAGNOSIS — G93.40 ENCEPHALOPATHY: ICD-10-CM

## 2024-03-01 DIAGNOSIS — M79.89 LEG SWELLING: Primary | ICD-10-CM

## 2024-03-01 DIAGNOSIS — E46 MALNUTRITION, UNSPECIFIED TYPE (MULTI): ICD-10-CM

## 2024-03-01 DIAGNOSIS — F31.61 BIPOLAR DISORDER, CURRENT EPISODE MIXED, MILD (MULTI): ICD-10-CM

## 2024-03-01 DIAGNOSIS — R60.0 EDEMA OF BOTH LEGS: ICD-10-CM

## 2024-03-01 DIAGNOSIS — E78.5 HYPERLIPIDEMIA, UNSPECIFIED HYPERLIPIDEMIA TYPE: ICD-10-CM

## 2024-03-01 DIAGNOSIS — I82.4Y9 DEEP VEIN THROMBOSIS (DVT) OF PROXIMAL LOWER EXTREMITY, UNSPECIFIED CHRONICITY, UNSPECIFIED LATERALITY (MULTI): ICD-10-CM

## 2024-03-01 DIAGNOSIS — F43.10 PTSD (POST-TRAUMATIC STRESS DISORDER): ICD-10-CM

## 2024-03-01 DIAGNOSIS — G62.9 NEUROPATHY: ICD-10-CM

## 2024-03-01 DIAGNOSIS — J44.9 COPD, VERY SEVERE (MULTI): ICD-10-CM

## 2024-03-01 LAB
ALBUMIN SERPL BCP-MCNC: 3.6 G/DL (ref 3.4–5)
ALP SERPL-CCNC: 44 U/L (ref 33–136)
ALT SERPL W P-5'-P-CCNC: 19 U/L (ref 10–52)
AMMONIA PLAS-SCNC: 30 UMOL/L (ref 16–53)
AMPHETAMINES UR QL SCN: ABNORMAL
ANION GAP BLDA CALCULATED.4IONS-SCNC: -2 MMO/L (ref 10–25)
ANION GAP SERPL CALC-SCNC: 11 MMOL/L (ref 10–20)
APAP SERPL-MCNC: <10 UG/ML
APPEARANCE UR: CLEAR
APTT PPP: 29 SECONDS (ref 27–38)
AST SERPL W P-5'-P-CCNC: 21 U/L (ref 9–39)
BARBITURATES UR QL SCN: ABNORMAL
BASE EXCESS BLDA CALC-SCNC: 18.2 MMOL/L (ref -2–3)
BASE EXCESS BLDV CALC-SCNC: 15.4 MMOL/L (ref -2–3)
BASOPHILS # BLD AUTO: 0.07 X10*3/UL (ref 0–0.1)
BASOPHILS NFR BLD AUTO: 1.2 %
BENZODIAZ UR QL SCN: ABNORMAL
BILIRUB SERPL-MCNC: 0.5 MG/DL (ref 0–1.2)
BILIRUB UR STRIP.AUTO-MCNC: NEGATIVE MG/DL
BNP SERPL-MCNC: 28 PG/ML (ref 0–99)
BODY TEMPERATURE: ABNORMAL
BODY TEMPERATURE: ABNORMAL
BUN SERPL-MCNC: 12 MG/DL (ref 6–23)
BZE UR QL SCN: ABNORMAL
CA-I BLDA-SCNC: 1.17 MMOL/L (ref 1.1–1.33)
CALCIUM SERPL-MCNC: 8.2 MG/DL (ref 8.6–10.3)
CANNABINOIDS UR QL SCN: ABNORMAL
CARDIAC TROPONIN I PNL SERPL HS: 6 NG/L (ref 0–20)
CHLORIDE BLDA-SCNC: 98 MMOL/L (ref 98–107)
CHLORIDE SERPL-SCNC: 97 MMOL/L (ref 98–107)
CK SERPL-CCNC: 184 U/L (ref 0–325)
CO2 SERPL-SCNC: 38 MMOL/L (ref 21–32)
COLOR UR: ABNORMAL
CREAT SERPL-MCNC: 0.72 MG/DL (ref 0.5–1.3)
D DIMER PPP FEU-MCNC: 4409 NG/ML FEU
EGFRCR SERPLBLD CKD-EPI 2021: >90 ML/MIN/1.73M*2
EOSINOPHIL # BLD AUTO: 0.47 X10*3/UL (ref 0–0.7)
EOSINOPHIL NFR BLD AUTO: 7.8 %
ERYTHROCYTE [DISTWIDTH] IN BLOOD BY AUTOMATED COUNT: 12.8 % (ref 11.5–14.5)
ETHANOL SERPL-MCNC: <10 MG/DL
FENTANYL+NORFENTANYL UR QL SCN: ABNORMAL
FLUAV RNA RESP QL NAA+PROBE: NOT DETECTED
FLUBV RNA RESP QL NAA+PROBE: NOT DETECTED
GLUCOSE BLDA-MCNC: 91 MG/DL (ref 74–99)
GLUCOSE SERPL-MCNC: 134 MG/DL (ref 74–99)
GLUCOSE UR STRIP.AUTO-MCNC: NEGATIVE MG/DL
HCO3 BLDA-SCNC: 46.3 MMOL/L (ref 22–26)
HCO3 BLDV-SCNC: 44.4 MMOL/L (ref 22–26)
HCT VFR BLD AUTO: 42.7 % (ref 41–52)
HCT VFR BLD EST: 35 % (ref 41–52)
HGB BLD-MCNC: 12.6 G/DL (ref 13.5–17.5)
HGB BLDA-MCNC: 11.7 G/DL (ref 13.5–17.5)
IMM GRANULOCYTES # BLD AUTO: 0.02 X10*3/UL (ref 0–0.7)
IMM GRANULOCYTES NFR BLD AUTO: 0.3 % (ref 0–0.9)
INHALED O2 CONCENTRATION: 3 %
INHALED O2 CONCENTRATION: 40 %
INR PPP: 1 (ref 0.9–1.1)
KETONES UR STRIP.AUTO-MCNC: ABNORMAL MG/DL
LACTATE BLDA-SCNC: 0.7 MMOL/L (ref 0.4–2)
LACTATE SERPL-SCNC: 1.5 MMOL/L (ref 0.4–2)
LEUKOCYTE ESTERASE UR QL STRIP.AUTO: NEGATIVE
LYMPHOCYTES # BLD AUTO: 1.35 X10*3/UL (ref 1.2–4.8)
LYMPHOCYTES NFR BLD AUTO: 22.5 %
MAGNESIUM SERPL-MCNC: 1.87 MG/DL (ref 1.6–2.4)
MCH RBC QN AUTO: 28.8 PG (ref 26–34)
MCHC RBC AUTO-ENTMCNC: 29.5 G/DL (ref 32–36)
MCV RBC AUTO: 98 FL (ref 80–100)
MONOCYTES # BLD AUTO: 0.51 X10*3/UL (ref 0.1–1)
MONOCYTES NFR BLD AUTO: 8.5 %
MUCOUS THREADS #/AREA URNS AUTO: NORMAL /LPF
NEUTROPHILS # BLD AUTO: 3.58 X10*3/UL (ref 1.2–7.7)
NEUTROPHILS NFR BLD AUTO: 59.7 %
NITRITE UR QL STRIP.AUTO: NEGATIVE
NRBC BLD-RTO: 0 /100 WBCS (ref 0–0)
OPIATES UR QL SCN: ABNORMAL
OXYCODONE+OXYMORPHONE UR QL SCN: ABNORMAL
OXYHGB MFR BLDA: 96.7 % (ref 94–98)
OXYHGB MFR BLDV: 71.9 % (ref 45–75)
PCO2 BLDA: 73 MM HG (ref 38–42)
PCO2 BLDV: 75 MM HG (ref 41–51)
PCP UR QL SCN: ABNORMAL
PH BLDA: 7.41 PH (ref 7.38–7.42)
PH BLDV: 7.38 PH (ref 7.33–7.43)
PH UR STRIP.AUTO: 7 [PH]
PLATELET # BLD AUTO: 148 X10*3/UL (ref 150–450)
PO2 BLDA: 111 MM HG (ref 85–95)
PO2 BLDV: 42 MM HG (ref 35–45)
POTASSIUM BLDA-SCNC: 4 MMOL/L (ref 3.5–5.3)
POTASSIUM SERPL-SCNC: 3.1 MMOL/L (ref 3.5–5.3)
PROT SERPL-MCNC: 6 G/DL (ref 6.4–8.2)
PROT UR STRIP.AUTO-MCNC: ABNORMAL MG/DL
PROTHROMBIN TIME: 11.6 SECONDS (ref 9.8–12.8)
RBC # BLD AUTO: 4.37 X10*6/UL (ref 4.5–5.9)
RBC # UR STRIP.AUTO: NEGATIVE /UL
RBC #/AREA URNS AUTO: NORMAL /HPF
RSV RNA RESP QL NAA+PROBE: NOT DETECTED
SALICYLATES SERPL-MCNC: <3 MG/DL
SAO2 % BLDA: 100 % (ref 94–100)
SAO2 % BLDV: 74 % (ref 45–75)
SARS-COV-2 RNA RESP QL NAA+PROBE: NOT DETECTED
SODIUM BLDA-SCNC: 138 MMOL/L (ref 136–145)
SODIUM SERPL-SCNC: 143 MMOL/L (ref 136–145)
SP GR UR STRIP.AUTO: 1.04
SQUAMOUS #/AREA URNS AUTO: NORMAL /HPF
UROBILINOGEN UR STRIP.AUTO-MCNC: 4 MG/DL
WBC # BLD AUTO: 6 X10*3/UL (ref 4.4–11.3)
WBC #/AREA URNS AUTO: NORMAL /HPF

## 2024-03-01 PROCEDURE — 72125 CT NECK SPINE W/O DYE: CPT | Performed by: SURGERY

## 2024-03-01 PROCEDURE — 70498 CT ANGIOGRAPHY NECK: CPT

## 2024-03-01 PROCEDURE — 72128 CT CHEST SPINE W/O DYE: CPT | Mod: RCN

## 2024-03-01 PROCEDURE — 82140 ASSAY OF AMMONIA: CPT | Performed by: PHYSICIAN ASSISTANT

## 2024-03-01 PROCEDURE — 87637 SARSCOV2&INF A&B&RSV AMP PRB: CPT | Performed by: PHYSICIAN ASSISTANT

## 2024-03-01 PROCEDURE — 72128 CT CHEST SPINE W/O DYE: CPT | Mod: RCN | Performed by: RADIOLOGY

## 2024-03-01 PROCEDURE — 94660 CPAP INITIATION&MGMT: CPT

## 2024-03-01 PROCEDURE — 1200000002 HC GENERAL ROOM WITH TELEMETRY DAILY

## 2024-03-01 PROCEDURE — 2500000004 HC RX 250 GENERAL PHARMACY W/ HCPCS (ALT 636 FOR OP/ED): Performed by: PHYSICIAN ASSISTANT

## 2024-03-01 PROCEDURE — 93005 ELECTROCARDIOGRAM TRACING: CPT

## 2024-03-01 PROCEDURE — 82805 BLOOD GASES W/O2 SATURATION: CPT | Performed by: PHYSICIAN ASSISTANT

## 2024-03-01 PROCEDURE — 2550000001 HC RX 255 CONTRASTS: Performed by: INTERNAL MEDICINE

## 2024-03-01 PROCEDURE — 70450 CT HEAD/BRAIN W/O DYE: CPT

## 2024-03-01 PROCEDURE — 85379 FIBRIN DEGRADATION QUANT: CPT | Performed by: NURSE PRACTITIONER

## 2024-03-01 PROCEDURE — 99223 1ST HOSP IP/OBS HIGH 75: CPT | Performed by: NURSE PRACTITIONER

## 2024-03-01 PROCEDURE — 72170 X-RAY EXAM OF PELVIS: CPT

## 2024-03-01 PROCEDURE — 71045 X-RAY EXAM CHEST 1 VIEW: CPT

## 2024-03-01 PROCEDURE — 71260 CT THORAX DX C+: CPT | Mod: RCN | Performed by: RADIOLOGY

## 2024-03-01 PROCEDURE — 83735 ASSAY OF MAGNESIUM: CPT | Performed by: PHYSICIAN ASSISTANT

## 2024-03-01 PROCEDURE — 84484 ASSAY OF TROPONIN QUANT: CPT | Performed by: PHYSICIAN ASSISTANT

## 2024-03-01 PROCEDURE — 36415 COLL VENOUS BLD VENIPUNCTURE: CPT | Performed by: PHYSICIAN ASSISTANT

## 2024-03-01 PROCEDURE — 2550000001 HC RX 255 CONTRASTS: Performed by: PHYSICIAN ASSISTANT

## 2024-03-01 PROCEDURE — 83880 ASSAY OF NATRIURETIC PEPTIDE: CPT | Performed by: PHYSICIAN ASSISTANT

## 2024-03-01 PROCEDURE — 80307 DRUG TEST PRSMV CHEM ANLYZR: CPT | Performed by: PHYSICIAN ASSISTANT

## 2024-03-01 PROCEDURE — 82550 ASSAY OF CK (CPK): CPT | Performed by: PHYSICIAN ASSISTANT

## 2024-03-01 PROCEDURE — 72125 CT NECK SPINE W/O DYE: CPT

## 2024-03-01 PROCEDURE — 83605 ASSAY OF LACTIC ACID: CPT | Performed by: PHYSICIAN ASSISTANT

## 2024-03-01 PROCEDURE — 72170 X-RAY EXAM OF PELVIS: CPT | Mod: FOREIGN READ | Performed by: RADIOLOGY

## 2024-03-01 PROCEDURE — 85730 THROMBOPLASTIN TIME PARTIAL: CPT | Performed by: PHYSICIAN ASSISTANT

## 2024-03-01 PROCEDURE — 94799 UNLISTED PULMONARY SVC/PX: CPT | Mod: MUE

## 2024-03-01 PROCEDURE — 81001 URINALYSIS AUTO W/SCOPE: CPT | Performed by: PHYSICIAN ASSISTANT

## 2024-03-01 PROCEDURE — 84132 ASSAY OF SERUM POTASSIUM: CPT | Performed by: NURSE PRACTITIONER

## 2024-03-01 PROCEDURE — 70496 CT ANGIOGRAPHY HEAD: CPT | Performed by: SURGERY

## 2024-03-01 PROCEDURE — 74177 CT ABD & PELVIS W/CONTRAST: CPT | Mod: RCN | Performed by: RADIOLOGY

## 2024-03-01 PROCEDURE — 85610 PROTHROMBIN TIME: CPT | Performed by: PHYSICIAN ASSISTANT

## 2024-03-01 PROCEDURE — 71045 X-RAY EXAM CHEST 1 VIEW: CPT | Mod: FOREIGN READ | Performed by: RADIOLOGY

## 2024-03-01 PROCEDURE — 74177 CT ABD & PELVIS W/CONTRAST: CPT

## 2024-03-01 PROCEDURE — 36600 WITHDRAWAL OF ARTERIAL BLOOD: CPT

## 2024-03-01 PROCEDURE — 2500000005 HC RX 250 GENERAL PHARMACY W/O HCPCS: Performed by: STUDENT IN AN ORGANIZED HEALTH CARE EDUCATION/TRAINING PROGRAM

## 2024-03-01 PROCEDURE — 2500000001 HC RX 250 WO HCPCS SELF ADMINISTERED DRUGS (ALT 637 FOR MEDICARE OP): Performed by: NURSE PRACTITIONER

## 2024-03-01 PROCEDURE — 80179 DRUG ASSAY SALICYLATE: CPT | Mod: 91 | Performed by: PHYSICIAN ASSISTANT

## 2024-03-01 PROCEDURE — 85025 COMPLETE CBC W/AUTO DIFF WBC: CPT | Performed by: PHYSICIAN ASSISTANT

## 2024-03-01 PROCEDURE — 80053 COMPREHEN METABOLIC PANEL: CPT | Performed by: PHYSICIAN ASSISTANT

## 2024-03-01 PROCEDURE — 72131 CT LUMBAR SPINE W/O DYE: CPT | Mod: RCN

## 2024-03-01 PROCEDURE — 99285 EMERGENCY DEPT VISIT HI MDM: CPT | Mod: 25

## 2024-03-01 PROCEDURE — 72131 CT LUMBAR SPINE W/O DYE: CPT | Mod: RCN | Performed by: RADIOLOGY

## 2024-03-01 PROCEDURE — 70498 CT ANGIOGRAPHY NECK: CPT | Performed by: SURGERY

## 2024-03-01 PROCEDURE — 96360 HYDRATION IV INFUSION INIT: CPT

## 2024-03-01 PROCEDURE — 70450 CT HEAD/BRAIN W/O DYE: CPT | Performed by: SURGERY

## 2024-03-01 PROCEDURE — 2550000001 HC RX 255 CONTRASTS: Performed by: STUDENT IN AN ORGANIZED HEALTH CARE EDUCATION/TRAINING PROGRAM

## 2024-03-01 PROCEDURE — 2500000004 HC RX 250 GENERAL PHARMACY W/ HCPCS (ALT 636 FOR OP/ED): Performed by: NURSE PRACTITIONER

## 2024-03-01 RX ORDER — SODIUM CHLORIDE 9 MG/ML
100 INJECTION, SOLUTION INTRAVENOUS CONTINUOUS
Status: DISCONTINUED | OUTPATIENT
Start: 2024-03-01 | End: 2024-03-04

## 2024-03-01 RX ORDER — FLUTICASONE FUROATE AND VILANTEROL 100; 25 UG/1; UG/1
1 POWDER RESPIRATORY (INHALATION)
Status: DISCONTINUED | OUTPATIENT
Start: 2024-03-02 | End: 2024-03-02

## 2024-03-01 RX ORDER — ENOXAPARIN SODIUM 100 MG/ML
40 INJECTION SUBCUTANEOUS DAILY
Status: DISCONTINUED | OUTPATIENT
Start: 2024-03-02 | End: 2024-03-04

## 2024-03-01 RX ORDER — LORAZEPAM 0.5 MG/1
0.5 TABLET ORAL EVERY 4 HOURS PRN
Status: DISCONTINUED | OUTPATIENT
Start: 2024-03-01 | End: 2024-03-03

## 2024-03-01 RX ORDER — QUETIAPINE FUMARATE 25 MG/1
25 TABLET, FILM COATED ORAL NIGHTLY
Status: DISCONTINUED | OUTPATIENT
Start: 2024-03-02 | End: 2024-03-03

## 2024-03-01 RX ORDER — ASPIRIN 300 MG/1
300 SUPPOSITORY RECTAL ONCE
Status: COMPLETED | OUTPATIENT
Start: 2024-03-01 | End: 2024-03-01

## 2024-03-01 RX ORDER — LORAZEPAM 1 MG/1
1 TABLET ORAL EVERY 4 HOURS PRN
Status: DISCONTINUED | OUTPATIENT
Start: 2024-03-01 | End: 2024-03-03

## 2024-03-01 RX ORDER — LORAZEPAM 0.5 MG/1
0.5 TABLET ORAL EVERY 4 HOURS
Status: DISCONTINUED | OUTPATIENT
Start: 2024-03-01 | End: 2024-03-01

## 2024-03-01 RX ORDER — POTASSIUM CHLORIDE 1.5 G/1.58G
20 POWDER, FOR SOLUTION ORAL DAILY
Status: DISCONTINUED | OUTPATIENT
Start: 2024-03-02 | End: 2024-03-18 | Stop reason: HOSPADM

## 2024-03-01 RX ORDER — ASPIRIN 300 MG/1
300 SUPPOSITORY RECTAL ONCE
Status: DISCONTINUED | OUTPATIENT
Start: 2024-03-01 | End: 2024-03-01

## 2024-03-01 RX ORDER — TORSEMIDE 20 MG/1
10 TABLET ORAL DAILY
Status: DISCONTINUED | OUTPATIENT
Start: 2024-03-02 | End: 2024-03-04

## 2024-03-01 RX ORDER — LORAZEPAM 1 MG/1
1 TABLET ORAL EVERY 4 HOURS
Status: DISCONTINUED | OUTPATIENT
Start: 2024-03-01 | End: 2024-03-01

## 2024-03-01 RX ORDER — PREDNISONE 20 MG/1
20 TABLET ORAL DAILY
Status: DISCONTINUED | OUTPATIENT
Start: 2024-03-02 | End: 2024-03-04

## 2024-03-01 RX ORDER — ASPIRIN 300 MG/1
150 SUPPOSITORY RECTAL DAILY
Status: DISCONTINUED | OUTPATIENT
Start: 2024-03-02 | End: 2024-03-04

## 2024-03-01 RX ORDER — POTASSIUM CHLORIDE 14.9 MG/ML
20 INJECTION INTRAVENOUS
Status: COMPLETED | OUTPATIENT
Start: 2024-03-01 | End: 2024-03-02

## 2024-03-01 RX ORDER — LORAZEPAM 1 MG/1
2 TABLET ORAL EVERY 4 HOURS PRN
Status: DISCONTINUED | OUTPATIENT
Start: 2024-03-01 | End: 2024-03-03

## 2024-03-01 RX ORDER — ATORVASTATIN CALCIUM 80 MG/1
80 TABLET, FILM COATED ORAL NIGHTLY
Status: DISCONTINUED | OUTPATIENT
Start: 2024-03-01 | End: 2024-03-18 | Stop reason: HOSPADM

## 2024-03-01 RX ORDER — DOCUSATE SODIUM 100 MG/1
100 CAPSULE, LIQUID FILLED ORAL 2 TIMES DAILY
Status: DISCONTINUED | OUTPATIENT
Start: 2024-03-01 | End: 2024-03-18 | Stop reason: HOSPADM

## 2024-03-01 RX ORDER — CARBIDOPA AND LEVODOPA 25; 100 MG/1; MG/1
1 TABLET ORAL 3 TIMES DAILY
Status: DISCONTINUED | OUTPATIENT
Start: 2024-03-02 | End: 2024-03-04

## 2024-03-01 RX ORDER — FOLIC ACID 1 MG/1
1 TABLET ORAL DAILY
Status: DISCONTINUED | OUTPATIENT
Start: 2024-03-02 | End: 2024-03-18 | Stop reason: HOSPADM

## 2024-03-01 RX ORDER — BUSPIRONE HYDROCHLORIDE 10 MG/1
10 TABLET ORAL 3 TIMES DAILY PRN
Status: DISCONTINUED | OUTPATIENT
Start: 2024-03-01 | End: 2024-03-12

## 2024-03-01 RX ORDER — LORAZEPAM 1 MG/1
2 TABLET ORAL EVERY 4 HOURS
Status: DISCONTINUED | OUTPATIENT
Start: 2024-03-01 | End: 2024-03-01

## 2024-03-01 RX ORDER — GABAPENTIN 300 MG/1
600 CAPSULE ORAL 3 TIMES DAILY
Status: DISCONTINUED | OUTPATIENT
Start: 2024-03-02 | End: 2024-03-03

## 2024-03-01 RX ORDER — ESCITALOPRAM OXALATE 10 MG/1
10 TABLET ORAL DAILY
Status: DISCONTINUED | OUTPATIENT
Start: 2024-03-02 | End: 2024-03-03

## 2024-03-01 RX ORDER — TRAZODONE HYDROCHLORIDE 50 MG/1
100 TABLET ORAL NIGHTLY PRN
Status: DISCONTINUED | OUTPATIENT
Start: 2024-03-01 | End: 2024-03-03

## 2024-03-01 RX ORDER — PANTOPRAZOLE SODIUM 40 MG/1
40 TABLET, DELAYED RELEASE ORAL
Status: DISCONTINUED | OUTPATIENT
Start: 2024-03-02 | End: 2024-03-04

## 2024-03-01 RX ORDER — LANOLIN ALCOHOL/MO/W.PET/CERES
100 CREAM (GRAM) TOPICAL DAILY
Status: DISCONTINUED | OUTPATIENT
Start: 2024-03-02 | End: 2024-03-18 | Stop reason: HOSPADM

## 2024-03-01 RX ORDER — MULTIVIT-MIN/IRON FUM/FOLIC AC 7.5 MG-4
1 TABLET ORAL DAILY
Status: DISCONTINUED | OUTPATIENT
Start: 2024-03-02 | End: 2024-03-03

## 2024-03-01 RX ADMIN — POTASSIUM CHLORIDE 20 MEQ: 14.9 INJECTION, SOLUTION INTRAVENOUS at 22:12

## 2024-03-01 RX ADMIN — Medication 5 L/MIN: at 19:07

## 2024-03-01 RX ADMIN — ATORVASTATIN CALCIUM 80 MG: 80 TABLET, FILM COATED ORAL at 23:29

## 2024-03-01 RX ADMIN — ASPIRIN 300 MG: 300 SUPPOSITORY RECTAL at 22:47

## 2024-03-01 RX ADMIN — SODIUM CHLORIDE 1000 ML: 9 INJECTION, SOLUTION INTRAVENOUS at 16:34

## 2024-03-01 RX ADMIN — IOHEXOL 75 ML: 350 INJECTION, SOLUTION INTRAVENOUS at 23:23

## 2024-03-01 RX ADMIN — Medication 3 L/MIN: at 22:01

## 2024-03-01 RX ADMIN — IOHEXOL 75 ML: 350 INJECTION, SOLUTION INTRAVENOUS at 17:35

## 2024-03-01 RX ADMIN — SODIUM CHLORIDE 100 ML/HR: 9 INJECTION, SOLUTION INTRAVENOUS at 22:13

## 2024-03-01 ASSESSMENT — COLUMBIA-SUICIDE SEVERITY RATING SCALE - C-SSRS
1. IN THE PAST MONTH, HAVE YOU WISHED YOU WERE DEAD OR WISHED YOU COULD GO TO SLEEP AND NOT WAKE UP?: NO
1. IN THE PAST MONTH, HAVE YOU WISHED YOU WERE DEAD OR WISHED YOU COULD GO TO SLEEP AND NOT WAKE UP?: NO
2. HAVE YOU ACTUALLY HAD ANY THOUGHTS OF KILLING YOURSELF?: NO
6. HAVE YOU EVER DONE ANYTHING, STARTED TO DO ANYTHING, OR PREPARED TO DO ANYTHING TO END YOUR LIFE?: NO
6. HAVE YOU EVER DONE ANYTHING, STARTED TO DO ANYTHING, OR PREPARED TO DO ANYTHING TO END YOUR LIFE?: NO
2. HAVE YOU ACTUALLY HAD ANY THOUGHTS OF KILLING YOURSELF?: NO

## 2024-03-01 ASSESSMENT — PAIN SCALES - PAIN ASSESSMENT IN ADVANCED DEMENTIA (PAINAD)
BODYLANGUAGE: RELAXED
BREATHING: NORMAL
BODYLANGUAGE: TENSE, DISTRESSED PACING, FIDGETING
FACIALEXPRESSION: SMILING OR INEXPRESSIVE
CONSOLABILITY: NO NEED TO CONSOLE
NEGVOCALIZATION: OCCASIONAL MOAN/GROAN, LOW SPEECH, NEGATIVE/DISAPPROVING QUALITY
TOTALSCORE: 0
FACIALEXPRESSION: SMILING OR INEXPRESSIVE
TOTALSCORE: 2
TOTALSCORE: 2
BREATHING: NORMAL
CONSOLABILITY: NO NEED TO CONSOLE
BREATHING: NORMAL
NEGVOCALIZATION: OCCASIONAL MOAN/GROAN, LOW SPEECH, NEGATIVE/DISAPPROVING QUALITY
FACIALEXPRESSION: SMILING OR INEXPRESSIVE
CONSOLABILITY: DISTRACTED OR REASSURED BY VOICE/TOUCH
BODYLANGUAGE: RELAXED

## 2024-03-01 ASSESSMENT — COGNITIVE AND FUNCTIONAL STATUS - GENERAL
STANDING UP FROM CHAIR USING ARMS: A LOT
TURNING FROM BACK TO SIDE WHILE IN FLAT BAD: A LOT
CLIMB 3 TO 5 STEPS WITH RAILING: TOTAL
EATING MEALS: A LITTLE
PERSONAL GROOMING: A LITTLE
MOBILITY SCORE: 11
DAILY ACTIVITIY SCORE: 15
MOVING TO AND FROM BED TO CHAIR: A LOT
TOILETING: A LOT
MOVING FROM LYING ON BACK TO SITTING ON SIDE OF FLAT BED WITH BEDRAILS: A LITTLE
WALKING IN HOSPITAL ROOM: TOTAL
HELP NEEDED FOR BATHING: A LOT
DRESSING REGULAR LOWER BODY CLOTHING: A LOT
DRESSING REGULAR UPPER BODY CLOTHING: A LITTLE

## 2024-03-01 ASSESSMENT — LIFESTYLE VARIABLES
ANXIETY: MILDLY ANXIOUS
HAVE PEOPLE ANNOYED YOU BY CRITICIZING YOUR DRINKING: NO
NAUSEA AND VOMITING: NO NAUSEA AND NO VOMITING
TREMOR: 3
ORIENTATION AND CLOUDING OF SENSORIUM: DISORIENTED FOR PLACE OR PERSON
EVER HAD A DRINK FIRST THING IN THE MORNING TO STEADY YOUR NERVES TO GET RID OF A HANGOVER: NO
VISUAL DISTURBANCES: NOT PRESENT
HEADACHE, FULLNESS IN HEAD: NOT PRESENT
EVER FELT BAD OR GUILTY ABOUT YOUR DRINKING: NO
AUDITORY DISTURBANCES: NOT PRESENT
HAVE YOU EVER FELT YOU SHOULD CUT DOWN ON YOUR DRINKING: NO
TOTAL SCORE: 9
PAROXYSMAL SWEATS: NO SWEAT VISIBLE
AGITATION: SOMEWHAT MORE THAN NORMAL ACTIVITY

## 2024-03-01 ASSESSMENT — PAIN - FUNCTIONAL ASSESSMENT
PAIN_FUNCTIONAL_ASSESSMENT: 0-10
PAIN_FUNCTIONAL_ASSESSMENT: PAINAD (PAIN ASSESSMENT IN ADVANCED DEMENTIA SCALE)
PAIN_FUNCTIONAL_ASSESSMENT: 0-10

## 2024-03-01 ASSESSMENT — ENCOUNTER SYMPTOMS: CONFUSION: 1

## 2024-03-02 ENCOUNTER — APPOINTMENT (OUTPATIENT)
Dept: RADIOLOGY | Facility: HOSPITAL | Age: 69
DRG: 091 | End: 2024-03-02
Payer: MEDICARE

## 2024-03-02 LAB
ANION GAP SERPL CALC-SCNC: 8 MMOL/L (ref 10–20)
BASE EXCESS BLDV CALC-SCNC: 11.4 MMOL/L (ref -2–3)
BODY TEMPERATURE: ABNORMAL
BUN SERPL-MCNC: 9 MG/DL (ref 6–23)
CALCIUM SERPL-MCNC: 8.5 MG/DL (ref 8.6–10.3)
CHLORIDE SERPL-SCNC: 98 MMOL/L (ref 98–107)
CK SERPL-CCNC: 117 U/L (ref 0–325)
CO2 SERPL-SCNC: 37 MMOL/L (ref 21–32)
CREAT SERPL-MCNC: 0.68 MG/DL (ref 0.5–1.3)
EGFRCR SERPLBLD CKD-EPI 2021: >90 ML/MIN/1.73M*2
GLUCOSE SERPL-MCNC: 97 MG/DL (ref 74–99)
HCO3 BLDV-SCNC: 40.3 MMOL/L (ref 22–26)
HOLD SPECIMEN: NORMAL
INHALED O2 CONCENTRATION: 3 %
OXYHGB MFR BLDV: 80.8 % (ref 45–75)
PCO2 BLDV: 73 MM HG (ref 41–51)
PH BLDV: 7.35 PH (ref 7.33–7.43)
PO2 BLDV: 51 MM HG (ref 35–45)
POTASSIUM SERPL-SCNC: 4 MMOL/L (ref 3.5–5.3)
SAO2 % BLDV: 83 % (ref 45–75)
SODIUM SERPL-SCNC: 139 MMOL/L (ref 136–145)
URATE SERPL-MCNC: 4.5 MG/DL (ref 4–7.5)

## 2024-03-02 PROCEDURE — 80048 BASIC METABOLIC PNL TOTAL CA: CPT | Performed by: NURSE PRACTITIONER

## 2024-03-02 PROCEDURE — 80348 DRUG SCREENING BUPRENORPHINE: CPT | Performed by: NURSE PRACTITIONER

## 2024-03-02 PROCEDURE — 82550 ASSAY OF CK (CPK): CPT | Performed by: FAMILY MEDICINE

## 2024-03-02 PROCEDURE — 2500000002 HC RX 250 W HCPCS SELF ADMINISTERED DRUGS (ALT 637 FOR MEDICARE OP, ALT 636 FOR OP/ED): Performed by: NURSE PRACTITIONER

## 2024-03-02 PROCEDURE — 93970 EXTREMITY STUDY: CPT | Performed by: RADIOLOGY

## 2024-03-02 PROCEDURE — 36415 COLL VENOUS BLD VENIPUNCTURE: CPT | Performed by: FAMILY MEDICINE

## 2024-03-02 PROCEDURE — 2500000001 HC RX 250 WO HCPCS SELF ADMINISTERED DRUGS (ALT 637 FOR MEDICARE OP): Performed by: NURSE PRACTITIONER

## 2024-03-02 PROCEDURE — 2500000004 HC RX 250 GENERAL PHARMACY W/ HCPCS (ALT 636 FOR OP/ED): Performed by: NURSE PRACTITIONER

## 2024-03-02 PROCEDURE — 99233 SBSQ HOSP IP/OBS HIGH 50: CPT | Performed by: FAMILY MEDICINE

## 2024-03-02 PROCEDURE — 36415 COLL VENOUS BLD VENIPUNCTURE: CPT | Performed by: NURSE PRACTITIONER

## 2024-03-02 PROCEDURE — 82805 BLOOD GASES W/O2 SATURATION: CPT | Performed by: NURSE PRACTITIONER

## 2024-03-02 PROCEDURE — 84550 ASSAY OF BLOOD/URIC ACID: CPT | Performed by: PSYCHIATRY & NEUROLOGY

## 2024-03-02 PROCEDURE — 2500000004 HC RX 250 GENERAL PHARMACY W/ HCPCS (ALT 636 FOR OP/ED)

## 2024-03-02 PROCEDURE — 99222 1ST HOSP IP/OBS MODERATE 55: CPT | Performed by: PSYCHIATRY & NEUROLOGY

## 2024-03-02 PROCEDURE — 93970 EXTREMITY STUDY: CPT

## 2024-03-02 PROCEDURE — 1200000002 HC GENERAL ROOM WITH TELEMETRY DAILY

## 2024-03-02 RX ORDER — LORAZEPAM 2 MG/ML
2 INJECTION INTRAMUSCULAR ONCE
Status: COMPLETED | OUTPATIENT
Start: 2024-03-02 | End: 2024-03-02

## 2024-03-02 RX ORDER — LORAZEPAM 2 MG/ML
INJECTION INTRAMUSCULAR
Status: DISPENSED
Start: 2024-03-02 | End: 2024-03-02

## 2024-03-02 RX ORDER — FLUTICASONE FUROATE AND VILANTEROL 200; 25 UG/1; UG/1
1 POWDER RESPIRATORY (INHALATION)
Status: DISCONTINUED | OUTPATIENT
Start: 2024-03-02 | End: 2024-03-04

## 2024-03-02 RX ADMIN — LORAZEPAM 2 MG: 2 INJECTION, SOLUTION INTRAMUSCULAR; INTRAVENOUS at 10:15

## 2024-03-02 RX ADMIN — ATORVASTATIN CALCIUM 80 MG: 80 TABLET, FILM COATED ORAL at 21:04

## 2024-03-02 RX ADMIN — ASPIRIN 150 MG: 300 SUPPOSITORY RECTAL at 09:01

## 2024-03-02 RX ADMIN — ESCITALOPRAM OXALATE 10 MG: 10 TABLET ORAL at 09:01

## 2024-03-02 RX ADMIN — Medication 1 TABLET: at 09:01

## 2024-03-02 RX ADMIN — POTASSIUM CHLORIDE 20 MEQ: 1.5 FOR SOLUTION ORAL at 09:01

## 2024-03-02 RX ADMIN — FOLIC ACID 1 MG: 1 TABLET ORAL at 09:02

## 2024-03-02 RX ADMIN — TIOTROPIUM BROMIDE INHALATION SPRAY 2 PUFF: 3.12 SPRAY, METERED RESPIRATORY (INHALATION) at 09:03

## 2024-03-02 RX ADMIN — CARBIDOPA AND LEVODOPA 1 TABLET: 25; 100 TABLET ORAL at 21:04

## 2024-03-02 RX ADMIN — CARBIDOPA AND LEVODOPA 1 TABLET: 25; 100 TABLET ORAL at 09:02

## 2024-03-02 RX ADMIN — THIAMINE HCL TAB 100 MG 100 MG: 100 TAB at 09:02

## 2024-03-02 RX ADMIN — FLUTICASONE FUROATE AND VILANTEROL TRIFENATATE 1 PUFF: 200; 25 POWDER RESPIRATORY (INHALATION) at 09:02

## 2024-03-02 RX ADMIN — POTASSIUM CHLORIDE 20 MEQ: 14.9 INJECTION, SOLUTION INTRAVENOUS at 00:32

## 2024-03-02 RX ADMIN — TORSEMIDE 10 MG: 20 TABLET ORAL at 09:02

## 2024-03-02 RX ADMIN — PANTOPRAZOLE SODIUM 40 MG: 40 TABLET, DELAYED RELEASE ORAL at 06:10

## 2024-03-02 RX ADMIN — ENOXAPARIN SODIUM 40 MG: 40 INJECTION SUBCUTANEOUS at 09:02

## 2024-03-02 RX ADMIN — PREDNISONE 20 MG: 20 TABLET ORAL at 09:02

## 2024-03-02 SDOH — SOCIAL STABILITY: SOCIAL INSECURITY: ARE THERE ANY APPARENT SIGNS OF INJURIES/BEHAVIORS THAT COULD BE RELATED TO ABUSE/NEGLECT?: UNABLE TO ASSESS

## 2024-03-02 SDOH — SOCIAL STABILITY: SOCIAL INSECURITY: HAS ANYONE EVER THREATENED TO HURT YOUR FAMILY OR YOUR PETS?: UNABLE TO ASSESS

## 2024-03-02 SDOH — SOCIAL STABILITY: SOCIAL INSECURITY: HAVE YOU HAD THOUGHTS OF HARMING ANYONE ELSE?: UNABLE TO ASSESS

## 2024-03-02 SDOH — SOCIAL STABILITY: SOCIAL INSECURITY: DOES ANYONE TRY TO KEEP YOU FROM HAVING/CONTACTING OTHER FRIENDS OR DOING THINGS OUTSIDE YOUR HOME?: UNABLE TO ASSESS

## 2024-03-02 SDOH — SOCIAL STABILITY: SOCIAL INSECURITY: DO YOU FEEL UNSAFE GOING BACK TO THE PLACE WHERE YOU ARE LIVING?: UNABLE TO ASSESS

## 2024-03-02 SDOH — SOCIAL STABILITY: SOCIAL INSECURITY: DO YOU FEEL ANYONE HAS EXPLOITED OR TAKEN ADVANTAGE OF YOU FINANCIALLY OR OF YOUR PERSONAL PROPERTY?: UNABLE TO ASSESS

## 2024-03-02 SDOH — SOCIAL STABILITY: SOCIAL INSECURITY: WERE YOU ABLE TO COMPLETE ALL THE BEHAVIORAL HEALTH SCREENINGS?: NO

## 2024-03-02 SDOH — SOCIAL STABILITY: SOCIAL INSECURITY: ABUSE: ADULT

## 2024-03-02 SDOH — SOCIAL STABILITY: SOCIAL INSECURITY: ARE YOU OR HAVE YOU BEEN THREATENED OR ABUSED PHYSICALLY, EMOTIONALLY, OR SEXUALLY BY ANYONE?: UNABLE TO ASSESS

## 2024-03-02 ASSESSMENT — COGNITIVE AND FUNCTIONAL STATUS - GENERAL
DAILY ACTIVITIY SCORE: 13
MOBILITY SCORE: 11
WALKING IN HOSPITAL ROOM: TOTAL
MOVING FROM LYING ON BACK TO SITTING ON SIDE OF FLAT BED WITH BEDRAILS: A LITTLE
TOILETING: A LOT
TURNING FROM BACK TO SIDE WHILE IN FLAT BAD: A LOT
WALKING IN HOSPITAL ROOM: TOTAL
DRESSING REGULAR LOWER BODY CLOTHING: A LOT
MOVING TO AND FROM BED TO CHAIR: A LOT
DRESSING REGULAR UPPER BODY CLOTHING: A LOT
PERSONAL GROOMING: A LOT
PATIENT BASELINE BEDBOUND: NO
HELP NEEDED FOR BATHING: A LOT
HELP NEEDED FOR BATHING: A LOT
TOILETING: A LOT
STANDING UP FROM CHAIR USING ARMS: A LOT
MOBILITY SCORE: 11
EATING MEALS: A LITTLE
STANDING UP FROM CHAIR USING ARMS: A LOT
MOVING FROM LYING ON BACK TO SITTING ON SIDE OF FLAT BED WITH BEDRAILS: A LITTLE
PERSONAL GROOMING: A LOT
DAILY ACTIVITIY SCORE: 13
DRESSING REGULAR UPPER BODY CLOTHING: A LOT
TURNING FROM BACK TO SIDE WHILE IN FLAT BAD: A LOT
DRESSING REGULAR LOWER BODY CLOTHING: A LOT
MOVING TO AND FROM BED TO CHAIR: A LOT
CLIMB 3 TO 5 STEPS WITH RAILING: TOTAL
CLIMB 3 TO 5 STEPS WITH RAILING: TOTAL
EATING MEALS: A LITTLE

## 2024-03-02 ASSESSMENT — LIFESTYLE VARIABLES
TOTAL SCORE: 6
TREMOR: 2
HEADACHE, FULLNESS IN HEAD: NOT PRESENT
SKIP TO QUESTIONS 9-10: 0
ANXIETY: NO ANXIETY, AT EASE
HOW OFTEN DO YOU HAVE 6 OR MORE DRINKS ON ONE OCCASION: PATIENT UNABLE TO ANSWER
AUDIT-C TOTAL SCORE: -1
AUDIT-C TOTAL SCORE: -1
AGITATION: NORMAL ACTIVITY
VISUAL DISTURBANCES: NOT PRESENT
HOW MANY STANDARD DRINKS CONTAINING ALCOHOL DO YOU HAVE ON A TYPICAL DAY: PATIENT UNABLE TO ANSWER
PAROXYSMAL SWEATS: NO SWEAT VISIBLE
AUDITORY DISTURBANCES: NOT PRESENT
ORIENTATION AND CLOUDING OF SENSORIUM: DISORIENTED FOR PLACE OR PERSON
NAUSEA AND VOMITING: NO NAUSEA AND NO VOMITING
HOW OFTEN DO YOU HAVE A DRINK CONTAINING ALCOHOL: PATIENT UNABLE TO ANSWER

## 2024-03-02 ASSESSMENT — PAIN SCALES - PAIN ASSESSMENT IN ADVANCED DEMENTIA (PAINAD)
BREATHING: NORMAL
FACIALEXPRESSION: SMILING OR INEXPRESSIVE
CONSOLABILITY: NO NEED TO CONSOLE
TOTALSCORE: 0
BODYLANGUAGE: RELAXED

## 2024-03-02 ASSESSMENT — ACTIVITIES OF DAILY LIVING (ADL)
GROOMING: UNABLE TO ASSESS
WALKS IN HOME: UNABLE TO ASSESS
FEEDING YOURSELF: UNABLE TO ASSESS
PATIENT'S MEMORY ADEQUATE TO SAFELY COMPLETE DAILY ACTIVITIES?: UNABLE TO ASSESS
DRESSING YOURSELF: UNABLE TO ASSESS
BATHING: UNABLE TO ASSESS
HEARING - RIGHT EAR: UNABLE TO ASSESS
JUDGMENT_ADEQUATE_SAFELY_COMPLETE_DAILY_ACTIVITIES: UNABLE TO ASSESS
TOILETING: UNABLE TO ASSESS
LACK_OF_TRANSPORTATION: PATIENT DECLINED
HEARING - LEFT EAR: UNABLE TO ASSESS
ADEQUATE_TO_COMPLETE_ADL: UNABLE TO ASSESS

## 2024-03-02 ASSESSMENT — PAIN - FUNCTIONAL ASSESSMENT: PAIN_FUNCTIONAL_ASSESSMENT: 0-10

## 2024-03-02 ASSESSMENT — PATIENT HEALTH QUESTIONNAIRE - PHQ9
2. FEELING DOWN, DEPRESSED OR HOPELESS: NOT AT ALL
SUM OF ALL RESPONSES TO PHQ9 QUESTIONS 1 & 2: 0
1. LITTLE INTEREST OR PLEASURE IN DOING THINGS: NOT AT ALL

## 2024-03-02 ASSESSMENT — VISUAL ACUITY: VA_NORMAL: 1

## 2024-03-02 ASSESSMENT — PAIN SCALES - GENERAL: PAINLEVEL_OUTOF10: 0 - NO PAIN

## 2024-03-02 NOTE — PROGRESS NOTES
Chris Wu is a 68 y.o. male on day 1 of admission presenting with Encephalopathy.      Subjective   Rapid response called due to agitation.  Patient was sleeping at admission however upon waking with BiPAP became agitated.  He was given 2 mg of Ativan and calm down.  On reevaluation the patient was easily aroused, can state his name but was still confused.       Objective     Last Recorded Vitals  /76 (BP Location: Left arm, Patient Position: Lying)   Pulse 81   Temp 36.6 °C (97.9 °F) (Temporal)   Resp 20   Wt 80 kg (176 lb 5.9 oz)   SpO2 98%   Intake/Output last 3 Shifts:    Intake/Output Summary (Last 24 hours) at 3/2/2024 0849  Last data filed at 3/2/2024 0618  Gross per 24 hour   Intake 1008.33 ml   Output 550 ml   Net 458.33 ml       Admission Weight  Weight: 72.6 kg (160 lb) (03/01/24 1533)    Daily Weight  03/01/24 : 80 kg (176 lb 5.9 oz)    Image Results  CT angio head and neck w and wo IV contrast  Narrative: Interpreted By:  Johnathon Higuera,   STUDY:  CT ANGIO HEAD AND NECK W AND WO IV CONTRAST;  3/1/2024 11:21 pm      INDICATION:  Signs/Symptoms:R/O STROKE.      COMPARISON:  Correlation made to noncontrast head CT of 03/01/2024.      ACCESSION NUMBER(S):  [id number]      ORDERING CLINICIAN:  SUGEY HURTADO      TECHNIQUE:  Unenhanced CT images of the head were obtained. Subsequently, 75 cc  Omnipaque 350 were administered intravenously and axial images of the  head and neck were acquired.  Coronal, sagittal, and 3-D  reconstructions were provided for review.      FINDINGS:  Noncontrast head CT: Moderate to advanced volume loss.  There is  periventricular and subcortical white matter hypoattenuation, most in  keeping with chronic microvascular ischemic change. Gray-white matter  differentiation is maintained. No evidence of acute intracranial  hemorrhage. No mass, mass effect, midline shift, hydrocephalus, or  abnormal extra-axial collection. Paranasal sinuses and mastoids are  clear. Skull  is within normal limits. Visible extracranial soft  tissues including the orbits appear within normal limits.      CTA HEAD FINDINGS:      Anterior circulation: The bilateral intracranial internal carotid  arteries, bilateral carotid terminals, bilateral proximal anterior  and middle cerebral arteries are normal.      Posterior circulation: Bilateral intracranial vertebral arteries,  vertebrobasilar junction, basilar artery and proximal posterior  cerebral arteries are normal.      No intracranial saccular aneurysm or other abnormal intracranial  enhancement is seen.      CTA NECK FINDINGS:      Right carotid vessels: Mild partially calcified plaque in the common  carotid artery without significant luminal narrowing.  Mild-to-moderate plaque in the bifurcation without significant  luminal narrowing. Moderate to severe plaque in the carotid bulb  causing narrowing of between 20 and 30% by NASCET criteria. The more  distal cervical ICA is of normal caliber without hemodynamically  significant luminal narrowing.      Left carotid vessels: Mild partially calcified plaque in the common  carotid artery without significant luminal narrowing.  Mild-to-moderate plaque in the bifurcation without significant  luminal narrowing. Moderate to severe plaque in the carotid bulb  causing narrowing of between 20 and 30% by NASCET criteria. The more  distal cervical ICA is of normal caliber without hemodynamically  significant luminal narrowing.      Vertebral vessels: Mild calcific plaque at the vertebral artery  origins without significant narrowing. The visualized segments of the  cervical vertebral arteries are normal in caliber. Right vertebral  artery is dominant.      ACDF changes noted. Severe emphysema. Biapical pleuroparenchymal  scarring.      Impression: No evidence of acute intracranial hemorrhage or cortical infarct on  noncontrast CT.      No evidence for hemodynamically significant stenosis of the cervical  vessels.  Mild atherosclerotic narrowing in the bilateral carotid  bulbs measuring between 20 and 30% by NASCET criteria.      No evidence for significant stenosis or large branch vessel cutoffs  of the intracranial vessels.      Incidentally noted severe emphysema in the visible lungs.      MACRO:  None      Signed by: Johnathon Hiugera 3/1/2024 11:46 PM  Dictation workstation:   [id number]  CT chest abdomen pelvis w IV contrast  Narrative: STUDY:  CT Chest, Abdomen, and Pelvis with IV Contrast, CT Reconstruction  Thoracic Spine and Lumbar Spine; 3/1/2024 at 5:58 p.m.  INDICATION:  Possible fall.  Altered mental status.  COMPARISON:  Two-view CXR 1/23/2024.  CT chest 8/1/2022.  US gallbladder 8/27/2020.  ACCESSION NUMBER(S):  [id number], [id number], [id number]  ORDERING CLINICIAN:  HUNTER ABBASI  TECHNIQUE:  CT of the chest, abdomen, and pelvis was performed.  Contiguous axial  images were obtained at 3 mm slice thickness through the chest,  abdomen, and pelvis.  Coronal and sagittal reconstructions at 3 mm  slice thickness were performed.  Omnipaque 350 75 mL was administered  intravenously.  Please note that spinal images were generated from the  original CT abdomen and pelvis imaging.   FINDINGS:  CHEST:  MEDIASTINUM:  The heart is normal in size without pericardial effusion.  Central  vascular structures opacify normally.    LUNGS/PLEURA:  There is no pleural effusion, pleural thickening, or pneumothorax.   The airways are patent. There is a 6 mm nodule in the right upper  lobe. This was present on the previous examination and is unchanged.  Lungs are clear without consolidation, interstitial disease, or  suspicious nodules.  LYMPH NODES:  Thoracic lymph nodes are not enlarged.  ABDOMEN:     LIVER:  No hepatomegaly.  Smooth surface contour.  Normal attenuation.     BILE DUCTS:  No intrahepatic or extrahepatic biliary ductal dilatation.     GALLBLADDER:  The gallbladder is unremarkable.  STOMACH:  No abnormalities  identified.     PANCREAS:  No masses or ductal dilatation.     SPLEEN:  No splenomegaly or focal splenic lesion.     ADRENAL GLANDS:  No thickening or nodules.     KIDNEYS AND URETERS:  Kidneys are normal in size and location.  No renal or ureteral  calculi.     PELVIS:     BLADDER:  No abnormalities identified.     REPRODUCTIVE ORGANS:  No abnormalities identified.     BOWEL:  No abnormalities identified.     VESSELS:  No abnormalities identified.  Abdominal aorta is normal in caliber.      PERITONEUM/RETROPERITONEUM/LYMPH NODES:  No free fluid.  No pneumoperitoneum.  No lymphadenopathy.     ABDOMINAL WALL:  No abnormalities identified.  SOFT TISSUES:   No abnormalities identified.     BONES:  No acute fracture or aggressive osseous lesion.  THORACIC SPINE:  The alignment is anatomic.  There is no fracture or traumatic  subluxation.  The vertebral body heights are well maintained.  Disc spaces are  preserved.  No significant central canal stenosis is demonstrated.   The neural foramina are patent throughout. There is mild degenerative  change.  The paravertebral soft tissues are within normal limits.   LUMBAR SPINE:  There is mild degenerative scoliosis as well as mild anterolisthesis  at L4-5. The spine is in otherwise good alignment.  There is no  fracture or traumatic subluxation.  The vertebral body heights are well maintained.  There is mild disc  space during throughout the lumbar spine.  No significant central  canal stenosis is demonstrated.  The right L4-5 neural foramen is  slightly narrowed.    The paravertebral soft tissues are within normal limits.  Impression: 1. No evidence for acute injury to the chest, abdomen, pelvis,  thoracic, or lumbar spine.  2. There is degenerative change in the lumbar spine as described.  3. There is a stable 6 mm nodule in the right upper lobe.  Signed by Robetr Oquendo MD  CT lumbar spine wo IV contrast  Narrative: STUDY:  CT Chest, Abdomen, and Pelvis with IV Contrast,  CT Reconstruction  Thoracic Spine and Lumbar Spine; 3/1/2024 at 5:58 p.m.  INDICATION:  Possible fall.  Altered mental status.  COMPARISON:  Two-view CXR 1/23/2024.  CT chest 8/1/2022.  US gallbladder 8/27/2020.  ACCESSION NUMBER(S):  [id number], [id number], [id number]  ORDERING CLINICIAN:  HUNTER ABBASI  TECHNIQUE:  CT of the chest, abdomen, and pelvis was performed.  Contiguous axial  images were obtained at 3 mm slice thickness through the chest,  abdomen, and pelvis.  Coronal and sagittal reconstructions at 3 mm  slice thickness were performed.  Omnipaque 350 75 mL was administered  intravenously.  Please note that spinal images were generated from the  original CT abdomen and pelvis imaging.   FINDINGS:  CHEST:  MEDIASTINUM:  The heart is normal in size without pericardial effusion.  Central  vascular structures opacify normally.    LUNGS/PLEURA:  There is no pleural effusion, pleural thickening, or pneumothorax.   The airways are patent. There is a 6 mm nodule in the right upper  lobe. This was present on the previous examination and is unchanged.  Lungs are clear without consolidation, interstitial disease, or  suspicious nodules.  LYMPH NODES:  Thoracic lymph nodes are not enlarged.  ABDOMEN:     LIVER:  No hepatomegaly.  Smooth surface contour.  Normal attenuation.     BILE DUCTS:  No intrahepatic or extrahepatic biliary ductal dilatation.     GALLBLADDER:  The gallbladder is unremarkable.  STOMACH:  No abnormalities identified.     PANCREAS:  No masses or ductal dilatation.     SPLEEN:  No splenomegaly or focal splenic lesion.     ADRENAL GLANDS:  No thickening or nodules.     KIDNEYS AND URETERS:  Kidneys are normal in size and location.  No renal or ureteral  calculi.     PELVIS:     BLADDER:  No abnormalities identified.     REPRODUCTIVE ORGANS:  No abnormalities identified.     BOWEL:  No abnormalities identified.     VESSELS:  No abnormalities identified.  Abdominal aorta is normal in caliber.       PERITONEUM/RETROPERITONEUM/LYMPH NODES:  No free fluid.  No pneumoperitoneum.  No lymphadenopathy.     ABDOMINAL WALL:  No abnormalities identified.  SOFT TISSUES:   No abnormalities identified.     BONES:  No acute fracture or aggressive osseous lesion.  THORACIC SPINE:  The alignment is anatomic.  There is no fracture or traumatic  subluxation.  The vertebral body heights are well maintained.  Disc spaces are  preserved.  No significant central canal stenosis is demonstrated.   The neural foramina are patent throughout. There is mild degenerative  change.  The paravertebral soft tissues are within normal limits.   LUMBAR SPINE:  There is mild degenerative scoliosis as well as mild anterolisthesis  at L4-5. The spine is in otherwise good alignment.  There is no  fracture or traumatic subluxation.  The vertebral body heights are well maintained.  There is mild disc  space during throughout the lumbar spine.  No significant central  canal stenosis is demonstrated.  The right L4-5 neural foramen is  slightly narrowed.    The paravertebral soft tissues are within normal limits.  Impression: 1. No evidence for acute injury to the chest, abdomen, pelvis,  thoracic, or lumbar spine.  2. There is degenerative change in the lumbar spine as described.  3. There is a stable 6 mm nodule in the right upper lobe.  Signed by Robert Oquendo MD  CT thoracic spine wo IV contrast  Narrative: STUDY:  CT Chest, Abdomen, and Pelvis with IV Contrast, CT Reconstruction  Thoracic Spine and Lumbar Spine; 3/1/2024 at 5:58 p.m.  INDICATION:  Possible fall.  Altered mental status.  COMPARISON:  Two-view CXR 1/23/2024.  CT chest 8/1/2022.  US gallbladder 8/27/2020.  ACCESSION NUMBER(S):  [id number], [id number], [id number]  ORDERING CLINICIAN:  HUNTER ABBASI  TECHNIQUE:  CT of the chest, abdomen, and pelvis was performed.  Contiguous axial  images were obtained at 3 mm slice thickness through the chest,  abdomen, and pelvis.   Coronal and sagittal reconstructions at 3 mm  slice thickness were performed.  Omnipaque 350 75 mL was administered  intravenously.  Please note that spinal images were generated from the  original CT abdomen and pelvis imaging.   FINDINGS:  CHEST:  MEDIASTINUM:  The heart is normal in size without pericardial effusion.  Central  vascular structures opacify normally.    LUNGS/PLEURA:  There is no pleural effusion, pleural thickening, or pneumothorax.   The airways are patent. There is a 6 mm nodule in the right upper  lobe. This was present on the previous examination and is unchanged.  Lungs are clear without consolidation, interstitial disease, or  suspicious nodules.  LYMPH NODES:  Thoracic lymph nodes are not enlarged.  ABDOMEN:     LIVER:  No hepatomegaly.  Smooth surface contour.  Normal attenuation.     BILE DUCTS:  No intrahepatic or extrahepatic biliary ductal dilatation.     GALLBLADDER:  The gallbladder is unremarkable.  STOMACH:  No abnormalities identified.     PANCREAS:  No masses or ductal dilatation.     SPLEEN:  No splenomegaly or focal splenic lesion.     ADRENAL GLANDS:  No thickening or nodules.     KIDNEYS AND URETERS:  Kidneys are normal in size and location.  No renal or ureteral  calculi.     PELVIS:     BLADDER:  No abnormalities identified.     REPRODUCTIVE ORGANS:  No abnormalities identified.     BOWEL:  No abnormalities identified.     VESSELS:  No abnormalities identified.  Abdominal aorta is normal in caliber.      PERITONEUM/RETROPERITONEUM/LYMPH NODES:  No free fluid.  No pneumoperitoneum.  No lymphadenopathy.     ABDOMINAL WALL:  No abnormalities identified.  SOFT TISSUES:   No abnormalities identified.     BONES:  No acute fracture or aggressive osseous lesion.  THORACIC SPINE:  The alignment is anatomic.  There is no fracture or traumatic  subluxation.  The vertebral body heights are well maintained.  Disc spaces are  preserved.  No significant central canal stenosis is  demonstrated.   The neural foramina are patent throughout. There is mild degenerative  change.  The paravertebral soft tissues are within normal limits.   LUMBAR SPINE:  There is mild degenerative scoliosis as well as mild anterolisthesis  at L4-5. The spine is in otherwise good alignment.  There is no  fracture or traumatic subluxation.  The vertebral body heights are well maintained.  There is mild disc  space during throughout the lumbar spine.  No significant central  canal stenosis is demonstrated.  The right L4-5 neural foramen is  slightly narrowed.    The paravertebral soft tissues are within normal limits.  Impression: 1. No evidence for acute injury to the chest, abdomen, pelvis,  thoracic, or lumbar spine.  2. There is degenerative change in the lumbar spine as described.  3. There is a stable 6 mm nodule in the right upper lobe.  Signed by Robert Oquendo MD  XR chest 1 view  Narrative: STUDY:  Chest Radiograph;  3/1/24 at 5:47 PM  INDICATION:  Altered mental status.  COMPARISON:  Chest XR 1/23/24.  ACCESSION NUMBER(S):  [id number]  ORDERING CLINICIAN:  HUNTER R WIRE  TECHNIQUE:  Frontal chest was obtained at 1746 hours. (two images)  FINDINGS:  CARDIOMEDIASTINAL SILHOUETTE:  Cardiomediastinal silhouette is normal in size and configuration.     LUNGS:  Lungs are clear.     ABDOMEN:  No remarkable upper abdominal findings.     BONES:  No acute osseous changes.  Impression: No acute pulmonary pathology.  Signed by Robert Oquendo MD  XR pelvis 1-2 views  Narrative: STUDY:  Pelvis Radiographs; 3/1/24 at 5:47 PM  INDICATION:  Left hip pain.  COMPARISON:  Left hip XR 4/30/16.  ACCESSION NUMBER(S):  [id number]  ORDERING CLINICIAN:  HUNTER R WIRE  TECHNIQUE:  Two view(s) of the pelvis.  FINDINGS:    The pelvic ring is intact.  There is no acute fracture.  There are degenerative changes lower lumbosacral spine.  There is an  inferior vena cava filter present.  Impression: No acute osseous  abnormalities.  Signed by Be Hare MD  CT head wo IV contrast, CT cervical spine wo IV contrast  Narrative: Interpreted By:  Johnathon Higuera,   STUDY:  CT HEAD WO IV CONTRAST; CT CERVICAL SPINE WO IV CONTRAST; ;  3/1/2024  5:38 pm      INDICATION:  Signs/Symptoms:ams possible fall.      COMPARISON:  Noncontrast CT head of 05/05/2021. CT cervical spine of 06/04/2010.      ACCESSION NUMBER(S):  [id number]; [id number]      ORDERING CLINICIAN:  HUNTER ABBASI      TECHNIQUE:  Noncontrast CT exams of the head and cervical spine with multiplanar  reformations.      FINDINGS:  BRAIN PARENCHYMA: Moderate to advanced volume loss.  There is  periventricular and subcortical white matter hypoattenuation, most in  keeping with chronic microvascular ischemic change. Cystic spaces in  the bilateral corpus striatum probably representing chronic lacunar  infarcts. Gray-white matter interfaces are preserved. No mass, mass  effect or midline shift.      HEMORRHAGE: No acute intracranial hemorrhage.  VENTRICLES and EXTRA-AXIAL SPACES: Normal size.  EXTRACRANIAL SOFT TISSUES: Within normal limits.  PARANASAL SINUSES/MASTOIDS: The visualized paranasal sinuses and  mastoid air cells are aerated. CALVARIUM: No depressed skull  fracture. No destructive osseous lesion.      OTHER FINDINGS: None.      CERVICAL SPINE:      Mild reversal of the normal cervical lordosis could reflect  positioning or muscle spasm. ALIGNMENT: New trace degenerative  anterolisthesis at C2-C3. Alignment is otherwise normal. VERTEBRAE:  Acute fracture. Vertebral stature is maintained. Redemonstrated ACDF  changes at C5-C7. Severe discogenic degeneration with endplate  irregularity and osteophytosis and uncovertebral hypertrophy at C3-C4  and C4-C5. Moderate hypertrophic facet osteoarthropathy excepting on  the right at C 2 C3 where it is severe in degree. SPINAL CANAL: No  critical spinal canal stenosis. Moderate to severe degenerative canal  narrowing at C4-C5  secondary to disc osteophyte bulging, similar to  before. PREVERTEBRAL SOFT TISSUES: No prevertebral soft tissue  swelling. LUNG APICES: Moderate to severe emphysema      OTHER FINDINGS: None.      Impression: No evidence of acute intracranial abnormality.      No acute cervical spine fracture or malalignment.          MACRO:  None      Signed by: Johnathon Higuera 3/1/2024 6:09 PM  Dictation workstation:   [id number]      Physical Exam  Constitutional:       Appearance: Normal appearance.      Comments: Agitated   HENT:      Head: Normocephalic and atraumatic.      Nose: Nose normal.      Mouth/Throat:      Mouth: Mucous membranes are dry.   Eyes:      Extraocular Movements: Extraocular movements intact.      Conjunctiva/sclera: Conjunctivae normal.   Cardiovascular:      Rate and Rhythm: Normal rate and regular rhythm.      Pulses: Normal pulses.      Heart sounds: Normal heart sounds.   Pulmonary:      Effort: Pulmonary effort is normal.      Breath sounds: Normal breath sounds.   Abdominal:      General: Abdomen is flat. Bowel sounds are normal.      Palpations: Abdomen is soft.   Musculoskeletal:         General: Normal range of motion.      Cervical back: Normal range of motion.   Skin:     General: Skin is warm.      Capillary Refill: Capillary refill takes less than 2 seconds.   Neurological:      Comments: Patient is hard of hearing but able to state his name, that he is in the hospital and state the month was February  Did not follow all commands   Psychiatric:         Behavior: Behavior is uncooperative.           Assessment/Plan      Chris Wu is a 68 y.o. male with a pertinent hx of COPD, Parkinson's disorder, history of factor V Leiden mutation with DVT and PE in the past & appears to not be on anticoagulation currently and is s/p IVC filter, depression/anxiety & PTSD & diastolic CHF who presented to [facility] due to altered mental status,     Altered mental status   Patient's mentation improved and upon  waking up with a BiPAP he became agitated.  Rapid response was called and patient responded to a 2 mg dose of IV Ativan  On reevaluation patient is hard of hearing but has no focal deficits  Strongly suspect polypharmacy  I attempted to reach out to contacts in the system however was unable to reach anybody for collateral information, will repeat in a.m.  CTA of head and neck reveals no evidence of hemodynamically significant stenosis of the cervical vessels with mild arteriosclerotic narrowing of bilateral carotid bulbs and no evidence of significant stenosis of the large branch vessel cutoffs of the intracranial vessels  CT of the head showed no acute findings  CT of C-spine showed no acute findings  CT of chest/abdomen/pelvis w/ c 6 mm right upper lobe nodule  CT of thoracic and lumbar spine showed no evidence of injury  Pelvic x-ray showed pelvic ring intact and no acute fractures  MRI pending  Concern for polypharmacy  Continue aspirin and statin  Continue neurochecks  Neurology consulted  Have added creatinine kinase    Polypharmacy  Likely cause of above  Pending workup of above  Currently holding gabapentin, hydroxyzine, BuSpar, trazodone and Seroquel  Monitor for progress and slowly restart medications as mentation improves    Elevated D-dimer  Patient is status post IVC filter  Follow-up with ultrasound for DVT    Proximal respiratory failure due to COPD  Patient requires oxygen at 3 L a minute at baseline  Continue LABA  Continue prednisone    Inability to care for self  PT OT and social work consulted    Parkinson disease  Continue carbidopa levodopa    Stable 6 mm right upper lobe nodule    Depression ADD/PTSD  Lexapro continue  Holding g BuSpar, trazodone and Seroquel due to concerns for polypharmacy    GERD  Continue PPI  DVT prophylaxis Lovenox and SCDs    Malvin Wilcox DO

## 2024-03-02 NOTE — ED PROVIDER NOTES
HPI   Chief Complaint   Patient presents with    Not acting right per [facility]-concerned for recent falls.        This is a 68-year-old male with PMH CVA, asthma, COPD on 3 L chronically, CHF, HTN presenting for evaluation of altered mental status.  History obtained from Rahel Kaplan [phone] patient's sister and from EMS.  Patient was visited by his cousin today and was found to be completely disoriented.  Unclear last known well.  Patient lives by himself at his insistence and has home health care come to give his medications and help him.  Question if he fell.  Unclear if he is anticoagulated although system shows that he was at some point on Xarelto due to protein C.      History provided by:  EMS personnel  History limited by:  Mental status change   used: No                        Grant Coma Scale Score: 14                     Patient History   Past Medical History:   Diagnosis Date    Abnormal weight loss     Weight loss    Activated protein C resistance (CMS/HCC)     Heterozygous factor V Leiden mutation    Cervicalgia     Neck pain    Chronic sinusitis, unspecified     Sinusitis    Encounter for screening for malignant neoplasm of colon 03/18/2016    Encounter for screening for malignant neoplasm of colon    Muscle weakness (generalized)     Muscle weakness    Opioid use, unspecified, uncomplicated     Opioid use    Other conditions influencing health status     A Fall    Pain in unspecified limb     Limb pain    Pain in unspecified shoulder     Pain, joint, shoulder    Paresthesia of skin     Tingling    Personal history of other diseases of the nervous system and sense organs     History of migraine headaches    Personal history of other diseases of the respiratory system     History of pleurisy    Personal history of other mental and behavioral disorders     History of psychosis    Personal history of other specified conditions     History of nausea    Personal history of other  specified conditions     History of dizziness    Personal history of other specified conditions     History of fatigue    Personal history of other venous thrombosis and embolism     History of deep venous thrombosis    Personal history of pulmonary embolism     History of pulmonary embolism    Unspecified abdominal pain 04/15/2016    Abdominal spasms     Past Surgical History:   Procedure Laterality Date    BACK SURGERY  2013    Lower Back Surgery    NECK SURGERY  2013    Neck Surgery    OTHER SURGICAL HISTORY  04/15/2016    Interruption Inferior Vena Cava Pat Filter Placement     Family History   Problem Relation Name Age of Onset    Arthritis Mother      Dementia Mother      Other (Cardiac Disorder) Father      Pain Other          Back    Pulmonary embolism Other          Unspecified Uncle    Hypertension Other      Cancer Sibling       Social History     Tobacco Use    Smoking status: Former     Packs/day: 0.50     Years: 50.00     Additional pack years: 0.00     Total pack years: 25.00     Types: Cigarettes     Quit date: 2023     Years since quittin.5    Smokeless tobacco: Never   Vaping Use    Vaping Use: Some days    Substances: Nicotine    Devices: Disposable, Pre-filled or refillable cartridge, Refillable tank, Pre-filled pod   Substance Use Topics    Alcohol use: Never    Drug use: Never       Physical Exam   ED Triage Vitals [24 1533]   Temperature Heart Rate Respirations BP   36.9 °C (98.4 °F) 100 18 105/65      Pulse Ox Temp src Heart Rate Source Patient Position   95 % -- -- --      BP Location FiO2 (%)     Left arm --       Physical Exam    Gen.: Vitals noted no distress.  Disheveled.  ANO x 1.  Head: Normocephalic, atraumatic. Pupils PERRL, EOMI. No entrapment signs. No midface tenderness. No nasal septal hematoma. No evidence of intraoral injury. Posterior oropharynx patent. No jaw malocclusion.   Neck: No midline or paraspinal tenderness. No step-off or  crepitance.  Cardiac: Regular rate and rhythm. No murmur.  Lungs: Clear to auscultation bilaterally with good aeration. No chest wall tenderness. No crepitus.  Abdomen: Soft, nontender  Back: No midline or paraspinal tenderness. No step-off or crepitance.  Extremities: 1+edema Left greater than right.  No focal joint or long bone tenderness. No deformities.   Skin: No abrasions. No lacerations.  Neuro: No focal deficits. GCS is 15.    ED Course & MDM   ED Course as of 03/01/24 1929   Fri Mar 01, 2024   1837 This is a 68-year-old male present with chief complaint of altered mental status, recent falls.  Blood gas significant for hypercapnia.  This looks to be more than his baseline, pH normal.  Concern if this is a hypercapnic induced encephalopathy.  Plan be lab work and imaging.  Will also start him on BiPAP and admit him here to medicine.  On exam here no focal neurodeficits.  Slightly confused, GCS 14.  Neurovascular intact in bilateral upper and lower extremities.  Pitting edema +1 bilateral lower extremities. [WL]      ED Course User Index  [WL] Robert Hansen DO         Diagnoses as of 03/01/24 1929   Encephalopathy   Hypercarbia       Medical Decision Making  Patient unable to contribute to history.  Given the questionable trauma and patient is a poor historian a broad workup was ordered.  He was pan scanned.  His laboratory evaluation reveals a hypercarbia of 75 on his blood gas with a normal pH, fairly unremarkable metabolic panel, hemoglobin 12, x-ray pelvis showing no acute osseous abnormalities and chest x-ray showing no acute cardiopulmonary process as read by the radiologist.  CT head and C-spine showed no acute intracranial process no acute cervical spine fracture or malalignment as read by the radiologist.  Urinalysis reveals no definitive UTI.  Remainder of imaging fairly unremarkable.  We will trial BiPAP given hypercarbia and reassess.  Discussed with hospitalist who accepted.  This visit was  staffed with the attending physician Dr. Hansen.      Disclaimer: This note was dictated using speech recognition software. An attempt at proofreading was made to minimize errors. Minor errors in transcription may be present. Please call if questions.    Amount and/or Complexity of Data Reviewed  Independent Historian: EMS  External Data Reviewed: notes.  Labs: ordered.  Radiology: ordered.  ECG/medicine tests: ordered and independent interpretation performed.     Details: EKG interpreted by me: Sinus rhythm.  PACs.  Motion artifact throughout.  Rate 95.  Left axis.  Septal Q's.   QRS 76 QTc 439.  No obvious STEMI.        Procedure  Procedures     Jose J R WirePATRICIA  03/01/24 1916       Jose J R WirePATRICIA  03/01/24 1929

## 2024-03-02 NOTE — H&P
History Of Present Illness  Chris Wu is a 68 y.o. male with a pertinent hx of COPD, Parkinson's disorder, history of factor V Leiden mutation with DVT and PE in the past & appears to not be on anticoagulation currently and is s/p IVC filter, depression/anxiety & PTSD & diastolic CHF who presented to ER d/t encephalopathy, cause not yet known, but is improving on admission. Spoke with sister Rahel who is unsure of his medications. She said a caregiver brings them to him daily in packets.  He has somebody that helps him with laundry and obtaining groceries.  His cousin found him today grunting and groaning and in and out of coherency.  The cousin called 911 due to concern.  His sister Rahel also said that his cousin found multiple bottles of pills in a drawer next to where he was sitting. He is on multiple sedating meds upon chart review, some were recently added and increased by psychiatry. He has a hx of medication overdose in the past and she is concerned for a possible stroke. She is also concerned he is not capable of living alone in his current condition. The patient is not able to provide any hx at this time.  All information obtained from chart review and report from Rahel.   Pertinent workup in the ER included: CT of the head showed no acute findings, CT of the cervical spine showed evidence of moderate to severe emphysema, but no acute findings.  CT of the chest/abdomen/pelvis along with thoracic and lumbar spine showed no evidence for acute injury.  Pelvic x-ray shows pelvic ring intact and no acute fracture. Chest xray showed clear lungs. BNP 28, K + 3.1, Chloride 97, bicarb 38, flu/RSV/COVID neg, blood gas showed CO2 75 that is chronic, PH 7.4, bicarb 46, tox screen + benzodiazepines, UA bland and no evidence for UTI, +1 ketones, d-dimer 4409.     Past Medical History  COPD, lung nodule, chronic low back pain, heterozygote for factor V 5 Leiden mutation, DVT and PE; does not appear to be on  anticoagulation, psychoses, depression, anxiety, nightmares, PTSD, pleurisy, Parkinson's disorder, diastolic CHF.    Surgical History  Status post IVC filter, low back surgery ×3, neck surgery.      Social History  He reports that he quit smoking about 7 months ago. His smoking use included cigarettes. He has a 25.00 pack-year smoking history. He has never used smokeless tobacco. He reports that he does drink alcohol (beer but would not quantify) and does not use drugs. History of opioid use disorder and heroin use.     Family History  Family History   Problem Relation Name Age of Onset    Arthritis Mother      Dementia Mother      Other (Cardiac Disorder) Father      Pain Other          Back    Pulmonary embolism Other          Unspecified Uncle    Hypertension Other      Cancer Sibling          Allergies  Aspirin-GI upset    Review of Systems   Reason unable to perform ROS: Unable to provide any details, obtained from family via phone.   Psychiatric/Behavioral:  Positive for confusion.        Physical Exam  Constitutional:       General: He is not in acute distress.     Appearance: He is normal weight. He is not ill-appearing, toxic-appearing or diaphoretic.      Comments: Lethargic and arousable to stimuli   HENT:      Head: Normocephalic and atraumatic.      Mouth/Throat:      Comments: Not assessed with bipap in place  Eyes:      Extraocular Movements: Extraocular movements intact.      Conjunctiva/sclera: Conjunctivae normal.   Cardiovascular:      Rate and Rhythm: Normal rate.      Pulses: Normal pulses.      Heart sounds: No murmur heard.     Comments: NO JVD  Pulmonary:      Effort: No respiratory distress.      Breath sounds: Normal breath sounds. No wheezing, rhonchi or rales.      Comments: +mild use of accessory muscles, clear to diminished throughout  Abdominal:      General: Bowel sounds are normal. There is no distension.      Palpations: Abdomen is soft.      Tenderness: There is no abdominal  tenderness. There is no guarding or rebound.   Musculoskeletal:         General: Swelling present.      Right lower leg: Edema present.      Left lower leg: Edema present.      Comments: RLE > LLE   Skin:     General: Skin is warm and dry.      Findings: Bruising present.      Comments: +petechiae to BLE   Neurological:      Mental Status: He is disoriented.      Comments: Not at baseline per family member Sary; GCS 9, not able to follow commands or answer questions, grunts and moans only. Unable to assess CN or perform stroke assessment.   Psychiatric:      Comments: Unable to assess       Last Recorded Vitals  /74   Pulse 95   Temp 36.9 °C (98.4 °F)   Resp 17   Wt 72.6 kg (160 lb)   SpO2 95% Comment: 3L    Relevant Results  Scheduled medications  [START ON 3/2/2024] aspirin, 150 mg, rectal, Daily  atorvastatin, 80 mg, oral, Nightly  [START ON 3/2/2024] carbidopa-levodopa, 1 tablet, oral, TID  docusate sodium, 100 mg, oral, BID  [START ON 3/2/2024] enoxaparin, 40 mg, subcutaneous, Daily  [START ON 3/2/2024] escitalopram, 10 mg, oral, Daily  [START ON 3/2/2024] tiotropium, 2 puff, inhalation, Daily   And  [START ON 3/2/2024] fluticasone furoate-vilanteroL, 1 puff, inhalation, Daily  [START ON 3/2/2024] folic acid, 1 mg, oral, Daily  [Held by provider] gabapentin, 600 mg, oral, TID  [START ON 3/2/2024] multivitamin with minerals, 1 tablet, oral, Daily  [START ON 3/2/2024] pantoprazole, 40 mg, oral, Daily before breakfast  [START ON 3/2/2024] potassium chloride, 20 mEq, oral, Daily  potassium chloride, 20 mEq, intravenous, q2h  [START ON 3/2/2024] predniSONE, 20 mg, oral, Daily  [Held by provider] QUEtiapine, 25 mg, oral, Nightly  [START ON 3/2/2024] thiamine, 100 mg, oral, Daily  [START ON 3/2/2024] torsemide, 10 mg, oral, Daily      Continuous medications  sodium chloride 0.9%, 100 mL/hr, Last Rate: 100 mL/hr (03/01/24 8511)      PRN medications  PRN medications: [Held by provider] busPIRone, [Held by  provider] LORazepam **OR** [Held by provider] LORazepam **OR** [Held by provider] LORazepam, oxygen, oxygen, [Held by provider] traZODone        Results for orders placed or performed during the hospital encounter of 03/01/24 (from the past 24 hour(s))   CBC and Auto Differential   Result Value Ref Range    WBC 6.0 4.4 - 11.3 x10*3/uL    nRBC 0.0 0.0 - 0.0 /100 WBCs    RBC 4.37 (L) 4.50 - 5.90 x10*6/uL    Hemoglobin 12.6 (L) 13.5 - 17.5 g/dL    Hematocrit 42.7 41.0 - 52.0 %    MCV 98 80 - 100 fL    MCH 28.8 26.0 - 34.0 pg    MCHC 29.5 (L) 32.0 - 36.0 g/dL    RDW 12.8 11.5 - 14.5 %    Platelets 148 (L) 150 - 450 x10*3/uL    Neutrophils % 59.7 40.0 - 80.0 %    Immature Granulocytes %, Automated 0.3 0.0 - 0.9 %    Lymphocytes % 22.5 13.0 - 44.0 %    Monocytes % 8.5 2.0 - 10.0 %    Eosinophils % 7.8 0.0 - 6.0 %    Basophils % 1.2 0.0 - 2.0 %    Neutrophils Absolute 3.58 1.20 - 7.70 x10*3/uL    Immature Granulocytes Absolute, Automated 0.02 0.00 - 0.70 x10*3/uL    Lymphocytes Absolute 1.35 1.20 - 4.80 x10*3/uL    Monocytes Absolute 0.51 0.10 - 1.00 x10*3/uL    Eosinophils Absolute 0.47 0.00 - 0.70 x10*3/uL    Basophils Absolute 0.07 0.00 - 0.10 x10*3/uL   Magnesium   Result Value Ref Range    Magnesium 1.87 1.60 - 2.40 mg/dL   Comprehensive metabolic panel   Result Value Ref Range    Glucose 134 (H) 74 - 99 mg/dL    Sodium 143 136 - 145 mmol/L    Potassium 3.1 (L) 3.5 - 5.3 mmol/L    Chloride 97 (L) 98 - 107 mmol/L    Bicarbonate 38 (H) 21 - 32 mmol/L    Anion Gap 11 10 - 20 mmol/L    Urea Nitrogen 12 6 - 23 mg/dL    Creatinine 0.72 0.50 - 1.30 mg/dL    eGFR >90 >60 mL/min/1.73m*2    Calcium 8.2 (L) 8.6 - 10.3 mg/dL    Albumin 3.6 3.4 - 5.0 g/dL    Alkaline Phosphatase 44 33 - 136 U/L    Total Protein 6.0 (L) 6.4 - 8.2 g/dL    AST 21 9 - 39 U/L    Bilirubin, Total 0.5 0.0 - 1.2 mg/dL    ALT 19 10 - 52 U/L   Lactate   Result Value Ref Range    Lactate 1.5 0.4 - 2.0 mmol/L   Troponin I, High Sensitivity   Result Value Ref  Range    Troponin I, High Sensitivity 6 0 - 20 ng/L   Acute Toxicology Panel, Blood   Result Value Ref Range    Acetaminophen <10.0 10.0 - 30.0 ug/mL    Salicylate  <3 4 - 20 mg/dL    Alcohol <10 <=10 mg/dL   Ammonia   Result Value Ref Range    Ammonia 30 16 - 53 umol/L   B-Type Natriuretic Peptide   Result Value Ref Range    BNP 28 0 - 99 pg/mL   Cardiac Enzymes - CPK   Result Value Ref Range    Creatine Kinase 184 0 - 325 U/L   Sars-CoV-2 and Influenza A/B PCR   Result Value Ref Range    Flu A Result Not Detected Not Detected    Flu B Result Not Detected Not Detected    Coronavirus 2019, PCR Not Detected Not Detected   RSV PCR   Result Value Ref Range    RSV PCR Not Detected Not Detected   Drug Screen, Urine   Result Value Ref Range    Amphetamine Screen, Urine Presumptive Negative Presumptive Negative    Barbiturate Screen, Urine Presumptive Negative Presumptive Negative    Benzodiazepines Screen, Urine Presumptive Positive (A) Presumptive Negative    Cannabinoid Screen, Urine Presumptive Negative Presumptive Negative    Cocaine Metabolite Screen, Urine Presumptive Negative Presumptive Negative    Fentanyl Screen, Urine Presumptive Negative Presumptive Negative    Opiate Screen, Urine Presumptive Negative Presumptive Negative    Oxycodone Screen, Urine Presumptive Negative Presumptive Negative    PCP Screen, Urine Presumptive Negative Presumptive Negative   Urinalysis with Reflex Culture and Microscopic   Result Value Ref Range    Color, Urine Karen (N) Straw, Yellow    Appearance, Urine Clear Clear    Specific Gravity, Urine 1.040 (N) 1.005 - 1.035    pH, Urine 7.0 5.0, 5.5, 6.0, 6.5, 7.0, 7.5, 8.0    Protein, Urine 30 (1+) (N) NEGATIVE mg/dL    Glucose, Urine NEGATIVE NEGATIVE mg/dL    Blood, Urine NEGATIVE NEGATIVE    Ketones, Urine 20 (1+) (A) NEGATIVE mg/dL    Bilirubin, Urine NEGATIVE NEGATIVE    Urobilinogen, Urine 4.0 (N) <2.0 mg/dL    Nitrite, Urine NEGATIVE NEGATIVE    Leukocyte Esterase, Urine NEGATIVE  NEGATIVE   Urinalysis Microscopic   Result Value Ref Range    WBC, Urine 1-5 1-5, NONE /HPF    RBC, Urine NONE NONE, 1-2, 3-5 /HPF    Squamous Epithelial Cells, Urine 1-9 (SPARSE) Reference range not established. /HPF    Mucus, Urine 4+ Reference range not established. /LPF   Protime-INR   Result Value Ref Range    Protime 11.6 9.8 - 12.8 seconds    INR 1.0 0.9 - 1.1   aPTT   Result Value Ref Range    aPTT 29 27 - 38 seconds   Blood Gas Venous   Result Value Ref Range    POCT pH, Venous 7.38 7.33 - 7.43 pH    POCT pCO2, Venous 75 (HH) 41 - 51 mm Hg    POCT pO2, Venous 42 35 - 45 mm Hg    POCT SO2, Venous 74 45 - 75 %    POCT Oxy Hemoglobin, Venous 71.9 45.0 - 75.0 %    POCT Base Excess, Venous 15.4 (H) -2.0 - 3.0 mmol/L    POCT HCO3 Calculated, Venous 44.4 (H) 22.0 - 26.0 mmol/L    Patient Temperature      FiO2 3 %     CT thoracic spine wo IV contrast    Result Date: 3/1/2024  STUDY: CT Chest, Abdomen, and Pelvis with IV Contrast, CT Reconstruction Thoracic Spine and Lumbar Spine; 3/1/2024 at 5:58 p.m. INDICATION: Possible fall.  Altered mental status. COMPARISON: Two-view CXR 1/23/2024.  CT chest 8/1/2022.  US gallbladder 8/27/2020. ACCESSION NUMBER(S): [id number], [id number], [id number] ORDERING CLINICIAN: HUNTER ABBASI TECHNIQUE: CT of the chest, abdomen, and pelvis was performed.  Contiguous axial images were obtained at 3 mm slice thickness through the chest, abdomen, and pelvis.  Coronal and sagittal reconstructions at 3 mm slice thickness were performed.  Omnipaque 350 75 mL was administered intravenously.  Please note that spinal images were generated from the original CT abdomen and pelvis imaging. FINDINGS: CHEST: MEDIASTINUM: The heart is normal in size without pericardial effusion.  Central vascular structures opacify normally.  LUNGS/PLEURA: There is no pleural effusion, pleural thickening, or pneumothorax. The airways are patent. There is a 6 mm nodule in the right upper lobe. This was present  on the previous examination and is unchanged. Lungs are clear without consolidation, interstitial disease, or suspicious nodules. LYMPH NODES: Thoracic lymph nodes are not enlarged. ABDOMEN:  LIVER: No hepatomegaly.  Smooth surface contour.  Normal attenuation.  BILE DUCTS: No intrahepatic or extrahepatic biliary ductal dilatation.  GALLBLADDER: The gallbladder is unremarkable. STOMACH: No abnormalities identified.  PANCREAS: No masses or ductal dilatation.  SPLEEN: No splenomegaly or focal splenic lesion.  ADRENAL GLANDS: No thickening or nodules.  KIDNEYS AND URETERS: Kidneys are normal in size and location.  No renal or ureteral calculi.  PELVIS:  BLADDER: No abnormalities identified.  REPRODUCTIVE ORGANS: No abnormalities identified.  BOWEL: No abnormalities identified.  VESSELS: No abnormalities identified.  Abdominal aorta is normal in caliber.  PERITONEUM/RETROPERITONEUM/LYMPH NODES: No free fluid.  No pneumoperitoneum. No lymphadenopathy.  ABDOMINAL WALL: No abnormalities identified. SOFT TISSUES: No abnormalities identified.  BONES: No acute fracture or aggressive osseous lesion. THORACIC SPINE: The alignment is anatomic.  There is no fracture or traumatic subluxation. The vertebral body heights are well maintained.  Disc spaces are preserved.  No significant central canal stenosis is demonstrated. The neural foramina are patent throughout. There is mild degenerative change. The paravertebral soft tissues are within normal limits. LUMBAR SPINE: There is mild degenerative scoliosis as well as mild anterolisthesis at L4-5. The spine is in otherwise good alignment.  There is no fracture or traumatic subluxation. The vertebral body heights are well maintained.  There is mild disc space during throughout the lumbar spine.  No significant central canal stenosis is demonstrated.  The right L4-5 neural foramen is slightly narrowed.  The paravertebral soft tissues are within normal limits.    1. No evidence for  acute injury to the chest, abdomen, pelvis, thoracic, or lumbar spine. 2. There is degenerative change in the lumbar spine as described. 3. There is a stable 6 mm nodule in the right upper lobe. Signed by Robert Oquendo MD    CT lumbar spine wo IV contrast    Result Date: 3/1/2024  STUDY: CT Chest, Abdomen, and Pelvis with IV Contrast, CT Reconstruction Thoracic Spine and Lumbar Spine; 3/1/2024 at 5:58 p.m. INDICATION: Possible fall.  Altered mental status. COMPARISON: Two-view CXR 1/23/2024.  CT chest 8/1/2022.  US gallbladder 8/27/2020. ACCESSION NUMBER(S): [id number], [id number], [id number] ORDERING CLINICIAN: HUNTER ABBASI TECHNIQUE: CT of the chest, abdomen, and pelvis was performed.  Contiguous axial images were obtained at 3 mm slice thickness through the chest, abdomen, and pelvis.  Coronal and sagittal reconstructions at 3 mm slice thickness were performed.  Omnipaque 350 75 mL was administered intravenously.  Please note that spinal images were generated from the original CT abdomen and pelvis imaging. FINDINGS: CHEST: MEDIASTINUM: The heart is normal in size without pericardial effusion.  Central vascular structures opacify normally.  LUNGS/PLEURA: There is no pleural effusion, pleural thickening, or pneumothorax. The airways are patent. There is a 6 mm nodule in the right upper lobe. This was present on the previous examination and is unchanged. Lungs are clear without consolidation, interstitial disease, or suspicious nodules. LYMPH NODES: Thoracic lymph nodes are not enlarged. ABDOMEN:  LIVER: No hepatomegaly.  Smooth surface contour.  Normal attenuation.  BILE DUCTS: No intrahepatic or extrahepatic biliary ductal dilatation.  GALLBLADDER: The gallbladder is unremarkable. STOMACH: No abnormalities identified.  PANCREAS: No masses or ductal dilatation.  SPLEEN: No splenomegaly or focal splenic lesion.  ADRENAL GLANDS: No thickening or nodules.  KIDNEYS AND URETERS: Kidneys are normal in size and  location.  No renal or ureteral calculi.  PELVIS:  BLADDER: No abnormalities identified.  REPRODUCTIVE ORGANS: No abnormalities identified.  BOWEL: No abnormalities identified.  VESSELS: No abnormalities identified.  Abdominal aorta is normal in caliber.  PERITONEUM/RETROPERITONEUM/LYMPH NODES: No free fluid.  No pneumoperitoneum. No lymphadenopathy.  ABDOMINAL WALL: No abnormalities identified. SOFT TISSUES: No abnormalities identified.  BONES: No acute fracture or aggressive osseous lesion. THORACIC SPINE: The alignment is anatomic.  There is no fracture or traumatic subluxation. The vertebral body heights are well maintained.  Disc spaces are preserved.  No significant central canal stenosis is demonstrated. The neural foramina are patent throughout. There is mild degenerative change. The paravertebral soft tissues are within normal limits. LUMBAR SPINE: There is mild degenerative scoliosis as well as mild anterolisthesis at L4-5. The spine is in otherwise good alignment.  There is no fracture or traumatic subluxation. The vertebral body heights are well maintained.  There is mild disc space during throughout the lumbar spine.  No significant central canal stenosis is demonstrated.  The right L4-5 neural foramen is slightly narrowed.  The paravertebral soft tissues are within normal limits.    1. No evidence for acute injury to the chest, abdomen, pelvis, thoracic, or lumbar spine. 2. There is degenerative change in the lumbar spine as described. 3. There is a stable 6 mm nodule in the right upper lobe. Signed by Robert Oquendo MD    CT chest abdomen pelvis w IV contrast    Result Date: 3/1/2024  STUDY: CT Chest, Abdomen, and Pelvis with IV Contrast, CT Reconstruction Thoracic Spine and Lumbar Spine; 3/1/2024 at 5:58 p.m. INDICATION: Possible fall.  Altered mental status. COMPARISON: Two-view CXR 1/23/2024.  CT chest 8/1/2022.  US gallbladder 8/27/2020. ACCESSION NUMBER(S): [id number], [id number],  [id number] ORDERING CLINICIAN: HUNTER ABBASI TECHNIQUE: CT of the chest, abdomen, and pelvis was performed.  Contiguous axial images were obtained at 3 mm slice thickness through the chest, abdomen, and pelvis.  Coronal and sagittal reconstructions at 3 mm slice thickness were performed.  Omnipaque 350 75 mL was administered intravenously.  Please note that spinal images were generated from the original CT abdomen and pelvis imaging. FINDINGS: CHEST: MEDIASTINUM: The heart is normal in size without pericardial effusion.  Central vascular structures opacify normally.  LUNGS/PLEURA: There is no pleural effusion, pleural thickening, or pneumothorax. The airways are patent. There is a 6 mm nodule in the right upper lobe. This was present on the previous examination and is unchanged. Lungs are clear without consolidation, interstitial disease, or suspicious nodules. LYMPH NODES: Thoracic lymph nodes are not enlarged. ABDOMEN:  LIVER: No hepatomegaly.  Smooth surface contour.  Normal attenuation.  BILE DUCTS: No intrahepatic or extrahepatic biliary ductal dilatation.  GALLBLADDER: The gallbladder is unremarkable. STOMACH: No abnormalities identified.  PANCREAS: No masses or ductal dilatation.  SPLEEN: No splenomegaly or focal splenic lesion.  ADRENAL GLANDS: No thickening or nodules.  KIDNEYS AND URETERS: Kidneys are normal in size and location.  No renal or ureteral calculi.  PELVIS:  BLADDER: No abnormalities identified.  REPRODUCTIVE ORGANS: No abnormalities identified.  BOWEL: No abnormalities identified.  VESSELS: No abnormalities identified.  Abdominal aorta is normal in caliber.  PERITONEUM/RETROPERITONEUM/LYMPH NODES: No free fluid.  No pneumoperitoneum. No lymphadenopathy.  ABDOMINAL WALL: No abnormalities identified. SOFT TISSUES: No abnormalities identified.  BONES: No acute fracture or aggressive osseous lesion. THORACIC SPINE: The alignment is anatomic.  There is no fracture or traumatic subluxation. The  vertebral body heights are well maintained.  Disc spaces are preserved.  No significant central canal stenosis is demonstrated. The neural foramina are patent throughout. There is mild degenerative change. The paravertebral soft tissues are within normal limits. LUMBAR SPINE: There is mild degenerative scoliosis as well as mild anterolisthesis at L4-5. The spine is in otherwise good alignment.  There is no fracture or traumatic subluxation. The vertebral body heights are well maintained.  There is mild disc space during throughout the lumbar spine.  No significant central canal stenosis is demonstrated.  The right L4-5 neural foramen is slightly narrowed.  The paravertebral soft tissues are within normal limits.    1. No evidence for acute injury to the chest, abdomen, pelvis, thoracic, or lumbar spine. 2. There is degenerative change in the lumbar spine as described. 3. There is a stable 6 mm nodule in the right upper lobe. Signed by Robert Oquendo MD    XR chest 1 view    Result Date: 3/1/2024  STUDY: Chest Radiograph;  3/1/24 at 5:47 PM INDICATION: Altered mental status. COMPARISON: Chest XR 1/23/24. ACCESSION NUMBER(S): [id number] ORDERING CLINICIAN: HUNTER R WIRE TECHNIQUE:  Frontal chest was obtained at 1746 hours. (two images) FINDINGS: CARDIOMEDIASTINAL SILHOUETTE: Cardiomediastinal silhouette is normal in size and configuration.  LUNGS: Lungs are clear.  ABDOMEN: No remarkable upper abdominal findings.  BONES: No acute osseous changes.    No acute pulmonary pathology. Signed by Robert Oquendo MD    XR pelvis 1-2 views    Result Date: 3/1/2024  STUDY: Pelvis Radiographs; 3/1/24 at 5:47 PM INDICATION: Left hip pain. COMPARISON: Left hip XR 4/30/16. ACCESSION NUMBER(S): [id number] ORDERING CLINICIAN: HUNTER R WIRE TECHNIQUE:  Two view(s) of the pelvis. FINDINGS:  The pelvic ring is intact.  There is no acute fracture. There are degenerative changes lower lumbosacral spine.  There is an inferior vena  cava filter present.    No acute osseous abnormalities. Signed by Be Hare MD    CT head wo IV contrast    Result Date: 3/1/2024  Interpreted By:  Johnathon Higuera, STUDY: CT HEAD WO IV CONTRAST; CT CERVICAL SPINE WO IV CONTRAST; ;  3/1/2024 5:38 pm   INDICATION: Signs/Symptoms:ams possible fall.   COMPARISON: Noncontrast CT head of 05/05/2021. CT cervical spine of 06/04/2010.   ACCESSION NUMBER(S): [id number]; [id number]   ORDERING CLINICIAN: HUNTER ABBASI   TECHNIQUE: Noncontrast CT exams of the head and cervical spine with multiplanar reformations.   FINDINGS: BRAIN PARENCHYMA: Moderate to advanced volume loss.  There is periventricular and subcortical white matter hypoattenuation, most in keeping with chronic microvascular ischemic change. Cystic spaces in the bilateral corpus striatum probably representing chronic lacunar infarcts. Gray-white matter interfaces are preserved. No mass, mass effect or midline shift.   HEMORRHAGE: No acute intracranial hemorrhage. VENTRICLES and EXTRA-AXIAL SPACES: Normal size. EXTRACRANIAL SOFT TISSUES: Within normal limits. PARANASAL SINUSES/MASTOIDS: The visualized paranasal sinuses and mastoid air cells are aerated. CALVARIUM: No depressed skull fracture. No destructive osseous lesion.   OTHER FINDINGS: None.   CERVICAL SPINE:   Mild reversal of the normal cervical lordosis could reflect positioning or muscle spasm. ALIGNMENT: New trace degenerative anterolisthesis at C2-C3. Alignment is otherwise normal. VERTEBRAE: Acute fracture. Vertebral stature is maintained. Redemonstrated ACDF changes at C5-C7. Severe discogenic degeneration with endplate irregularity and osteophytosis and uncovertebral hypertrophy at C3-C4 and C4-C5. Moderate hypertrophic facet osteoarthropathy excepting on the right at C 2 C3 where it is severe in degree. SPINAL CANAL: No critical spinal canal stenosis. Moderate to severe degenerative canal narrowing at C4-C5 secondary to disc osteophyte bulging,  similar to before. PREVERTEBRAL SOFT TISSUES: No prevertebral soft tissue swelling. LUNG APICES: Moderate to severe emphysema   OTHER FINDINGS: None.       No evidence of acute intracranial abnormality.   No acute cervical spine fracture or malalignment.     MACRO: None   Signed by: Johnathon Higuera 3/1/2024 6:09 PM Dictation workstation:   [id number]    CT cervical spine wo IV contrast    Result Date: 3/1/2024  Interpreted By:  Johnathon Higuera, STUDY: CT HEAD WO IV CONTRAST; CT CERVICAL SPINE WO IV CONTRAST; ;  3/1/2024 5:38 pm   INDICATION: Signs/Symptoms:ams possible fall.   COMPARISON: Noncontrast CT head of 05/05/2021. CT cervical spine of 06/04/2010.   ACCESSION NUMBER(S): [id number]; [id number]   ORDERING CLINICIAN: HUNTER ABBASI   TECHNIQUE: Noncontrast CT exams of the head and cervical spine with multiplanar reformations.   FINDINGS: BRAIN PARENCHYMA: Moderate to advanced volume loss.  There is periventricular and subcortical white matter hypoattenuation, most in keeping with chronic microvascular ischemic change. Cystic spaces in the bilateral corpus striatum probably representing chronic lacunar infarcts. Gray-white matter interfaces are preserved. No mass, mass effect or midline shift.   HEMORRHAGE: No acute intracranial hemorrhage. VENTRICLES and EXTRA-AXIAL SPACES: Normal size. EXTRACRANIAL SOFT TISSUES: Within normal limits. PARANASAL SINUSES/MASTOIDS: The visualized paranasal sinuses and mastoid air cells are aerated. CALVARIUM: No depressed skull fracture. No destructive osseous lesion.   OTHER FINDINGS: None.   CERVICAL SPINE:   Mild reversal of the normal cervical lordosis could reflect positioning or muscle spasm. ALIGNMENT: New trace degenerative anterolisthesis at C2-C3. Alignment is otherwise normal. VERTEBRAE: Acute fracture. Vertebral stature is maintained. Redemonstrated ACDF changes at C5-C7. Severe discogenic degeneration with endplate irregularity and osteophytosis and uncovertebral  hypertrophy at C3-C4 and C4-C5. Moderate hypertrophic facet osteoarthropathy excepting on the right at C 2 C3 where it is severe in degree. SPINAL CANAL: No critical spinal canal stenosis. Moderate to severe degenerative canal narrowing at C4-C5 secondary to disc osteophyte bulging, similar to before. PREVERTEBRAL SOFT TISSUES: No prevertebral soft tissue swelling. LUNG APICES: Moderate to severe emphysema   OTHER FINDINGS: None.       No evidence of acute intracranial abnormality.   No acute cervical spine fracture or malalignment.     MACRO: None   Signed by: Johnathon Higuera 3/1/2024 6:09 PM Dictation workstation:   [id number]      Assessment/Plan   Principal Problem:  Metabolic Encephalopathy, Cause not yet known  -Suspect polypharmacy & metabolic alkalosis, but cannot exclude stroke, UA neg, no PNA, FLU/COVID AND RSV neg  -Added a buprenorphine urine screen-F/U  Will complete CTA head and neck and MRI brain to r/o stroke  Will add aspirin and statin overnight for stroke protection  Neuro checks Q 4  Neuro consult-appreciate recs  Sitter for confusion    Polypharmacy  Need to verify meds before can be resumed and taking multiple sedating meds and recently filled suboxone on 2/29 on [facility] but [facility] showed last fill end Jan 2024 along with oxazepam but hasn't been filled in weeks-both held  [facility] completed  Holding gabapentin, hydroxyzine, buspar (10 mg six times a day-recent fill 168 pills), trazodone, recently had Seroquel increased on 2/19 from 25 to 50.  Needs to f/u with psych regarding medication dosages    Elevated D-dimer with RLE > LLE/Hx Clotting Disorder/HX PE & DVT  -S/P VC filter  Elevate LE  Will check US to exclude extensive DVT requiring thrombectomy-follow up    Metabolic Alkalosis/Hypokalemia/Hypochloremia  -On gas and bmp  Will hydrate overnight to see for improvement  Replete K overnight and recheck in morning  Repeat gas in morning-F/U  Tele  Resume torsemide in morning    Chronic COPD with  Hypercarbia/Hypoxia  -Partial compensation on gas  -On baseline oxygen 3 L and stable, Well score 4.5, but not tachycardic, without fever and not with increased hypoxia, recent multiple CT with contrast, consider CT chest in future if worsens  Placed on oxygen after repeat ABG without improved CO2  Pulse ox  Repeat gas in morning-F/U  Continue LABA, prednisone    Inability to Care For Self/Inability to Perform ADLs  PT/OT/SW  Needs to assign POA eventually and needs SNF placement unless improves    Concern for Possible Alcohol Abuse  -told the nurse he drinks and has a tremor (may be from Parkinson's  Started CIWA and will monitor; holding ativan  Thiamine/Folic acid and MV    Parkinson's Disease  Resume carbidopa/levadopa TID    Insomnia  Trazodone on hold     Depression/Anxiety/PTSD  Resume lexapro  Holding gabapentin, hydroxyzine, buspar (10 mg six times a day-recent fill 168 pills), trazodone, recently had Seroquel increased on 2/19 from 25 to 50.  Needs to f/u with psych regarding medication dosages    GERD  PPI    DVT PX  SCDs  Lovenox    Lyndsay Augustine, APRN-CNP  Home meds need verified when able. Pt not able to provide and Sary does not know. Verified meds with MAR that could be verified.

## 2024-03-02 NOTE — PROGRESS NOTES
"   03/02/24 1522   Discharge Planning   Living Arrangements Alone   Support Systems [employer] community;Friends/neighbors   Assistance Needed yes   Type of Residence Private residence   Patient expects to be discharged to: [facility] recieved consult re: pt needing POA. Pt is currently confused and in soft restraints, has a sitter. SW called and spoke with pt sister Micheal who may be able to provide information needed for MRI screening. Bedside RN updated. Sister stated that pt has a really good friend named \"Rib\" that used to be his neighbor but always checks on pt and knows everything about him. Sister said she could probably get Rib's phone number. SW reviewed outside facility records [facility] Palliative Navigator program. Per assessment, pt has a son and daughter. SW asked pt sister Micheal for pt daughter contact information- sister declined to provide this information to SW stating that she wasn't sure pt daughter would want to be involved. SW went past pt bedside and noticed vistors. SW introduced self- Blu Summers ([phone])  and his wife were at pt bedside who stated that they are friends of pt from [employer]- they do know \"Rib\" and gave SW contact information for Rib's wife Andie [phone]. Blu stated that he has contact information for pt dtr. SW deferred asking for this information as per sister this was not something to be given out.       "

## 2024-03-02 NOTE — CARE PLAN
The patient's goals for the shift include  to not require BiPaP this shift.     The clinical goals for the shift include patients mentation will improve this shift

## 2024-03-02 NOTE — CARE PLAN
Problem: Respiratory  Goal: No signs of respiratory distress (eg. Use of accessory muscles. Peds grunting)  Outcome: Progressing       The clinical goals for the shift include patients mentation will improve this shift    Over the shift, the patient did not make progress toward the following goals. Barriers to progression include patient is very hard of hearing and confused . Recommendations to address these barriers include reiteration of care and family involvement.

## 2024-03-02 NOTE — CONSULTS
Consults    History Of Present Illness  Chris Wu is a 68 y.o. male presenting with confusion.  History reviewed and admission notes.  Currently the patient is on wrist restraints he opens his eyes when I asked his name but he is not able to verbalize.  Patient is moving all extremities symmetrically unable to localize pain when stimulated at the nailbed and bilateral lower extremities on touching slightly bilateral upper extremities he is able to localize and opens his eyes.  He is unable to follow any other commands.      Past Medical History  Past Medical History:   Diagnosis Date    Abnormal weight loss     Weight loss    Activated protein C resistance (CMS/HCC)     Heterozygous factor V Leiden mutation    Cervicalgia     Neck pain    Chronic sinusitis, unspecified     Sinusitis    Encounter for screening for malignant neoplasm of colon 03/18/2016    Encounter for screening for malignant neoplasm of colon    Muscle weakness (generalized)     Muscle weakness    Opioid use, unspecified, uncomplicated     Opioid use    Other conditions influencing health status     A Fall    Pain in unspecified limb     Limb pain    Pain in unspecified shoulder     Pain, joint, shoulder    Paresthesia of skin     Tingling    Personal history of other diseases of the nervous system and sense organs     History of migraine headaches    Personal history of other diseases of the respiratory system     History of pleurisy    Personal history of other mental and behavioral disorders     History of psychosis    Personal history of other specified conditions     History of nausea    Personal history of other specified conditions     History of dizziness    Personal history of other specified conditions     History of fatigue    Personal history of other venous thrombosis and embolism     History of deep venous thrombosis    Personal history of pulmonary embolism     History of pulmonary embolism    Unspecified abdominal pain  04/15/2016    Abdominal spasms     Surgical History  Past Surgical History:   Procedure Laterality Date    BACK SURGERY  2013    Lower Back Surgery    NECK SURGERY  2013    Neck Surgery    OTHER SURGICAL HISTORY  04/15/2016    Interruption Inferior Vena Cava Pat Filter Placement     Social History  Social History     Tobacco Use    Smoking status: Former     Packs/day: 0.50     Years: 50.00     Additional pack years: 0.00     Total pack years: 25.00     Types: Cigarettes     Quit date: 2023     Years since quittin.5    Smokeless tobacco: Never   Vaping Use    Vaping Use: Some days    Substances: Nicotine    Devices: Disposable, Pre-filled or refillable cartridge, Refillable tank, Pre-filled pod   Substance Use Topics    Alcohol use: Never    Drug use: Never     Allergies  Aspirin  Medications Prior to Admission   Medication Sig Dispense Refill Last Dose    albuterol 1.25 mg/3 mL nebulizer solution INHALE 1 VIAL VIA NEBULIZER EVERY 4 HOURS AS NEEDED 75 mL 2     albuterol 90 mcg/actuation inhaler INHALE 1-2 PUFFS EVERY 4-6 HOURS AS NEEDED AND AS DIRECTED. 18 g 3     baclofen (Lioresal) 10 mg tablet Take by mouth 3 times a day as needed.       Breztri Aerosphere 160-9-4.8 mcg/actuation HFA aerosol inhaler INHALE 2 PUFFS 2 TIMES A DAY. 10.7 g 5     budesonide-formoteroL (Symbicort) 160-4.5 mcg/actuation inhaler Inhale every 12 hours.       buprenorphine-naloxone (Suboxone) 8-2 mg SL tablet Place 2 tablets under the tongue once daily.       carbidopa-levodopa (Sinemet)  mg tablet TAKE 1 TABLET BY MOUTH THREE TIMES A DAY 84 tablet 1     cholecalciferol (Vitamin D3) 1,250 mcg (50,000 unit) tablet 1 capsule.       cloNIDine (Catapres) 0.1 mg tablet Take 1 tablet (0.1 mg) by mouth 2 times a day.       diphenhydrAMINE-acetaminophen (Tylenol PM)  mg per tablet Take 30 mL by mouth.       escitalopram (Lexapro) 20 mg tablet Take 1 tablet (20 mg) by mouth once daily. 90 tablet 1      gabapentin (Neurontin) 600 mg tablet TAKE 1 TABLET (600 MG) BY MOUTH 3 TIMES A DAY. 84 tablet 3     hydrocolloid dressing (DUODERM CGF EXTRA THIN TOP) Apply to affected areas once daily       hydrOXYzine HCL (Atarax) 25 mg tablet hydrOXYzine HCl - 25 MG Oral Tablet   Quantity: 14  Refills: 0        Start : 11-Apr-2022   Active       hydrOXYzine HCL (Atarax) 25 mg tablet Take 1 tablet (25 mg) by mouth 2 times a day.       hydrOXYzine pamoate (Vistaril) 25 mg capsule        ipratropium-albuteroL (Duo-Neb) 0.5-2.5 mg/3 mL nebulizer solution INHALE 1 UNIT DOSE FOUR TIMES A DAY AS NEEDED       meloxicam (Mobic) 15 mg tablet 1 tablet (15 mg) once daily as needed. With food       montelukast (Singulair) 10 mg tablet Take 1 tablet (10 mg) by mouth once daily.       naloxone (Narcan) 4 mg/0.1 mL nasal spray Administer 1 spray (4 mg) into affected nostril(s) if needed for opioid reversal. May repeat every 2-3 minutes if needed, alternating nostrils, until medical assistance becomes available. 2 each 0     omeprazole (PriLOSEC) 40 mg DR capsule TAKE 1 CAPSULE BY MOUTH EVERY MORNING BEFORE MEALS 28 capsule 3     oxazepam (Serax) 15 mg capsule Take 2 capsules (30 mg) by mouth 4 times a day as needed for anxiety. 120 capsule 0     oxygen (O2) gas therapy Inhale 4 L/min continuously. Indications: hypoxia       potassium chloride CR (Klor-Con M20) 20 mEq ER tablet Take 1 tablet (20 mEq) by mouth once daily. Do not crush or chew. 30 tablet 11     predniSONE (Deltasone) 20 mg tablet TAKE 1 TABLET (20 MG) BY MOUTH ONCE DAILY. 28 tablet 2     propranolol LA (Inderal LA) 160 mg 24 hr capsule Take 1 capsule (160 mg) by mouth once daily. Do not crush, chew, or split. 90 capsule 1     QUEtiapine (SEROquel) 25 mg tablet Take 1 tablet (25 mg) by mouth 4 times a day as needed (severe anxiety).       QUEtiapine (SeroqueL) 50 mg tablet Take 1 tablet (50 mg) by mouth 4 times a day as needed (for anxiety). 320 tablet 5     torsemide (Demadex) 10  mg tablet Take 1 tablet (10 mg) by mouth once daily. 30 tablet 11     torsemide (Demadex) 10 mg tablet Take 1 tablet (10 mg) by mouth once daily.       torsemide (Demadex) 10 mg tablet Take 2 tablets (20 mg) by mouth once daily.       torsemide (Demadex) 20 mg tablet Take 1 tablet (20 mg) by mouth once daily. 14 tablet 0     traZODone (Desyrel) 150 mg tablet Take 0.5 tablets (75 mg) by mouth as needed at bedtime.          Review of Systems   Reason unable to perform ROS: unablwe to answer and confused and sleepy.     Neurological Exam  Mental Status  Drowsy. Recent and remote memory are intact. Patient is nonverbal. Able to perform serial calculations. Able to spell words backwards.    Cranial Nerves  CN II: Visual acuity is normal. Right funduscopic exam: disc intact. Left funduscopic exam: disc intact.  CN III, IV, VI: Extraocular movements intact bilaterally. Normal lids and orbits bilaterally.   Right pupil: Round. Reactive to light. Reactive to accommodation.  CN V:  Right: Facial sensation is normal.  Left: Facial sensation is normal on the left.  CN VII: Full and symmetric facial movement.  CN VIII:  Right: Hearing is normal.  Left: Hearing is normal.  CN IX, X:  Right: Palate is normal.  Left: Palate is normal.  CN XI:  Right: Sternocleidomastoid strength is normal.  Left: Sternocleidomastoid strength is normal.  CN XII: Tongue midline without atrophy or fasciculations.    Motor  Normal muscle bulk throughout. No fasciculations present. Normal muscle tone. No abnormal involuntary movements. Strength is 5/5 throughout all four extremities.    Sensory  Light touch is normal in upper and lower extremities. Pinprick is normal in upper and lower extremities.     Reflexes  Deep tendon reflexes are 2+ and symmetric except as noted.                                            Right                      Left  Patellar                                0                         0  Achilles                                0  "                        0  Jaw jerk absent.  Right pathological reflexes: Roberto Carlos's absent.  Left pathological reflexes: Roberto Carlos's absent.  Glabellar tap absent. Snout absent. Right palmomental absent. Left palmomental absent. Right palmar grasp absent. Left palmar grasp absent.    Coordination    Unable.    Gait    Unable and deferred.    Physical Exam  HENT:      Right Ear: Hearing normal.      Left Ear: Hearing normal.   Eyes:      General: Lids are normal.      Extraocular Movements: Extraocular movements intact.   Neurological:      Motor: Motor strength is normal.     Deep Tendon Reflexes:      Reflex Scores:       Patellar reflexes are 0 on the right side and 0 on the left side.       Achilles reflexes are 0 on the right side and 0 on the left side.      Last Recorded Vitals  Blood pressure 127/74, pulse 96, temperature 37.3 °C (99.1 °F), temperature source Temporal, resp. rate 20, height 1.75 m (5' 8.9\"), weight 80 kg (176 lb 5.9 oz), SpO2 94 %.            [place] Coma Scale  Best Eye Response: Spontaneous  Best Verbal Response: Confused  Best Motor Response: Follows commands  [place] Coma Scale Score: 14                 I have personally reviewed the following imaging results CT angio head and neck w and wo IV contrast    Result Date: 3/1/2024  Interpreted By:  Johnathon Higuera, STUDY: CT ANGIO HEAD AND NECK W AND WO IV CONTRAST;  3/1/2024 11:21 pm   INDICATION: Signs/Symptoms:R/O STROKE.   COMPARISON: Correlation made to noncontrast head CT of 03/01/2024.   ACCESSION NUMBER(S): [id number]   ORDERING CLINICIAN: SUGEY HURTADO   TECHNIQUE: Unenhanced CT images of the head were obtained. Subsequently, 75 cc Omnipaque 350 were administered intravenously and axial images of the head and neck were acquired.  Coronal, sagittal, and 3-D reconstructions were provided for review.   FINDINGS: Noncontrast head CT: Moderate to advanced volume loss.  There is periventricular and subcortical white matter hypoattenuation, " most in keeping with chronic microvascular ischemic change. Gray-white matter differentiation is maintained. No evidence of acute intracranial hemorrhage. No mass, mass effect, midline shift, hydrocephalus, or abnormal extra-axial collection. Paranasal sinuses and mastoids are clear. Skull is within normal limits. Visible extracranial soft tissues including the orbits appear within normal limits.   CTA HEAD FINDINGS:   Anterior circulation: The bilateral intracranial internal carotid arteries, bilateral carotid terminals, bilateral proximal anterior and middle cerebral arteries are normal.   Posterior circulation: Bilateral intracranial vertebral arteries, vertebrobasilar junction, basilar artery and proximal posterior cerebral arteries are normal.   No intracranial saccular aneurysm or other abnormal intracranial enhancement is seen.   CTA NECK FINDINGS:   Right carotid vessels: Mild partially calcified plaque in the common carotid artery without significant luminal narrowing. Mild-to-moderate plaque in the bifurcation without significant luminal narrowing. Moderate to severe plaque in the carotid bulb causing narrowing of between 20 and 30% by NASCET criteria. The more distal cervical ICA is of normal caliber without hemodynamically significant luminal narrowing.   Left carotid vessels: Mild partially calcified plaque in the common carotid artery without significant luminal narrowing. Mild-to-moderate plaque in the bifurcation without significant luminal narrowing. Moderate to severe plaque in the carotid bulb causing narrowing of between 20 and 30% by NASCET criteria. The more distal cervical ICA is of normal caliber without hemodynamically significant luminal narrowing.   Vertebral vessels: Mild calcific plaque at the vertebral artery origins without significant narrowing. The visualized segments of the cervical vertebral arteries are normal in caliber. Right vertebral artery is dominant.   ACDF changes noted.  Severe emphysema. Biapical pleuroparenchymal scarring.       No evidence of acute intracranial hemorrhage or cortical infarct on noncontrast CT.   No evidence for hemodynamically significant stenosis of the cervical vessels. Mild atherosclerotic narrowing in the bilateral carotid bulbs measuring between 20 and 30% by NASCET criteria.   No evidence for significant stenosis or large branch vessel cutoffs of the intracranial vessels.   Incidentally noted severe emphysema in the visible lungs.   MACRO: None   Signed by: Johnathon Higuera 3/1/2024 11:46 PM Dictation workstation:   [id number]    CT thoracic spine wo IV contrast    Result Date: 3/1/2024  STUDY: CT Chest, Abdomen, and Pelvis with IV Contrast, CT Reconstruction Thoracic Spine and Lumbar Spine; 3/1/2024 at 5:58 p.m. INDICATION: Possible fall.  Altered mental status. COMPARISON: Two-view CXR 1/23/2024.  CT chest 8/1/2022.  US gallbladder 8/27/2020. ACCESSION NUMBER(S): [id number], [id number], [id number] ORDERING CLINICIAN: HUNTER ABBASI TECHNIQUE: CT of the chest, abdomen, and pelvis was performed.  Contiguous axial images were obtained at 3 mm slice thickness through the chest, abdomen, and pelvis.  Coronal and sagittal reconstructions at 3 mm slice thickness were performed.  Omnipaque 350 75 mL was administered intravenously.  Please note that spinal images were generated from the original CT abdomen and pelvis imaging. FINDINGS: CHEST: MEDIASTINUM: The heart is normal in size without pericardial effusion.  Central vascular structures opacify normally.  LUNGS/PLEURA: There is no pleural effusion, pleural thickening, or pneumothorax. The airways are patent. There is a 6 mm nodule in the right upper lobe. This was present on the previous examination and is unchanged. Lungs are clear without consolidation, interstitial disease, or suspicious nodules. LYMPH NODES: Thoracic lymph nodes are not enlarged. ABDOMEN:  LIVER: No hepatomegaly.  Smooth surface contour.   Normal attenuation.  BILE DUCTS: No intrahepatic or extrahepatic biliary ductal dilatation.  GALLBLADDER: The gallbladder is unremarkable. STOMACH: No abnormalities identified.  PANCREAS: No masses or ductal dilatation.  SPLEEN: No splenomegaly or focal splenic lesion.  ADRENAL GLANDS: No thickening or nodules.  KIDNEYS AND URETERS: Kidneys are normal in size and location.  No renal or ureteral calculi.  PELVIS:  BLADDER: No abnormalities identified.  REPRODUCTIVE ORGANS: No abnormalities identified.  BOWEL: No abnormalities identified.  VESSELS: No abnormalities identified.  Abdominal aorta is normal in caliber.  PERITONEUM/RETROPERITONEUM/LYMPH NODES: No free fluid.  No pneumoperitoneum. No lymphadenopathy.  ABDOMINAL WALL: No abnormalities identified. SOFT TISSUES: No abnormalities identified.  BONES: No acute fracture or aggressive osseous lesion. THORACIC SPINE: The alignment is anatomic.  There is no fracture or traumatic subluxation. The vertebral body heights are well maintained.  Disc spaces are preserved.  No significant central canal stenosis is demonstrated. The neural foramina are patent throughout. There is mild degenerative change. The paravertebral soft tissues are within normal limits. LUMBAR SPINE: There is mild degenerative scoliosis as well as mild anterolisthesis at L4-5. The spine is in otherwise good alignment.  There is no fracture or traumatic subluxation. The vertebral body heights are well maintained.  There is mild disc space during throughout the lumbar spine.  No significant central canal stenosis is demonstrated.  The right L4-5 neural foramen is slightly narrowed.  The paravertebral soft tissues are within normal limits.    1. No evidence for acute injury to the chest, abdomen, pelvis, thoracic, or lumbar spine. 2. There is degenerative change in the lumbar spine as described. 3. There is a stable 6 mm nodule in the right upper lobe. Signed by Robert Oquendo MD    CT lumbar spine wo  IV contrast    Result Date: 3/1/2024  STUDY: CT Chest, Abdomen, and Pelvis with IV Contrast, CT Reconstruction Thoracic Spine and Lumbar Spine; 3/1/2024 at 5:58 p.m. INDICATION: Possible fall.  Altered mental status. COMPARISON: Two-view CXR 1/23/2024.  CT chest 8/1/2022.  US gallbladder 8/27/2020. ACCESSION NUMBER(S): [id number], [id number], [id number] ORDERING CLINICIAN: HUNTER ABBASI TECHNIQUE: CT of the chest, abdomen, and pelvis was performed.  Contiguous axial images were obtained at 3 mm slice thickness through the chest, abdomen, and pelvis.  Coronal and sagittal reconstructions at 3 mm slice thickness were performed.  Omnipaque 350 75 mL was administered intravenously.  Please note that spinal images were generated from the original CT abdomen and pelvis imaging. FINDINGS: CHEST: MEDIASTINUM: The heart is normal in size without pericardial effusion.  Central vascular structures opacify normally.  LUNGS/PLEURA: There is no pleural effusion, pleural thickening, or pneumothorax. The airways are patent. There is a 6 mm nodule in the right upper lobe. This was present on the previous examination and is unchanged. Lungs are clear without consolidation, interstitial disease, or suspicious nodules. LYMPH NODES: Thoracic lymph nodes are not enlarged. ABDOMEN:  LIVER: No hepatomegaly.  Smooth surface contour.  Normal attenuation.  BILE DUCTS: No intrahepatic or extrahepatic biliary ductal dilatation.  GALLBLADDER: The gallbladder is unremarkable. STOMACH: No abnormalities identified.  PANCREAS: No masses or ductal dilatation.  SPLEEN: No splenomegaly or focal splenic lesion.  ADRENAL GLANDS: No thickening or nodules.  KIDNEYS AND URETERS: Kidneys are normal in size and location.  No renal or ureteral calculi.  PELVIS:  BLADDER: No abnormalities identified.  REPRODUCTIVE ORGANS: No abnormalities identified.  BOWEL: No abnormalities identified.  VESSELS: No abnormalities identified.  Abdominal aorta is normal in  caliber.  PERITONEUM/RETROPERITONEUM/LYMPH NODES: No free fluid.  No pneumoperitoneum. No lymphadenopathy.  ABDOMINAL WALL: No abnormalities identified. SOFT TISSUES: No abnormalities identified.  BONES: No acute fracture or aggressive osseous lesion. THORACIC SPINE: The alignment is anatomic.  There is no fracture or traumatic subluxation. The vertebral body heights are well maintained.  Disc spaces are preserved.  No significant central canal stenosis is demonstrated. The neural foramina are patent throughout. There is mild degenerative change. The paravertebral soft tissues are within normal limits. LUMBAR SPINE: There is mild degenerative scoliosis as well as mild anterolisthesis at L4-5. The spine is in otherwise good alignment.  There is no fracture or traumatic subluxation. The vertebral body heights are well maintained.  There is mild disc space during throughout the lumbar spine.  No significant central canal stenosis is demonstrated.  The right L4-5 neural foramen is slightly narrowed.  The paravertebral soft tissues are within normal limits.    1. No evidence for acute injury to the chest, abdomen, pelvis, thoracic, or lumbar spine. 2. There is degenerative change in the lumbar spine as described. 3. There is a stable 6 mm nodule in the right upper lobe. Signed by Robert qOuendo MD    CT chest abdomen pelvis w IV contrast    Result Date: 3/1/2024  STUDY: CT Chest, Abdomen, and Pelvis with IV Contrast, CT Reconstruction Thoracic Spine and Lumbar Spine; 3/1/2024 at 5:58 p.m. INDICATION: Possible fall.  Altered mental status. COMPARISON: Two-view CXR 1/23/2024.  CT chest 8/1/2022.  US gallbladder 8/27/2020. ACCESSION NUMBER(S): [id number], [id number], [id number] ORDERING CLINICIAN: HUNTER ABBASI TECHNIQUE: CT of the chest, abdomen, and pelvis was performed.  Contiguous axial images were obtained at 3 mm slice thickness through the chest, abdomen, and pelvis.  Coronal and sagittal reconstructions at 3  mm slice thickness were performed.  Omnipaque 350 75 mL was administered intravenously.  Please note that spinal images were generated from the original CT abdomen and pelvis imaging. FINDINGS: CHEST: MEDIASTINUM: The heart is normal in size without pericardial effusion.  Central vascular structures opacify normally.  LUNGS/PLEURA: There is no pleural effusion, pleural thickening, or pneumothorax. The airways are patent. There is a 6 mm nodule in the right upper lobe. This was present on the previous examination and is unchanged. Lungs are clear without consolidation, interstitial disease, or suspicious nodules. LYMPH NODES: Thoracic lymph nodes are not enlarged. ABDOMEN:  LIVER: No hepatomegaly.  Smooth surface contour.  Normal attenuation.  BILE DUCTS: No intrahepatic or extrahepatic biliary ductal dilatation.  GALLBLADDER: The gallbladder is unremarkable. STOMACH: No abnormalities identified.  PANCREAS: No masses or ductal dilatation.  SPLEEN: No splenomegaly or focal splenic lesion.  ADRENAL GLANDS: No thickening or nodules.  KIDNEYS AND URETERS: Kidneys are normal in size and location.  No renal or ureteral calculi.  PELVIS:  BLADDER: No abnormalities identified.  REPRODUCTIVE ORGANS: No abnormalities identified.  BOWEL: No abnormalities identified.  VESSELS: No abnormalities identified.  Abdominal aorta is normal in caliber.  PERITONEUM/RETROPERITONEUM/LYMPH NODES: No free fluid.  No pneumoperitoneum. No lymphadenopathy.  ABDOMINAL WALL: No abnormalities identified. SOFT TISSUES: No abnormalities identified.  BONES: No acute fracture or aggressive osseous lesion. THORACIC SPINE: The alignment is anatomic.  There is no fracture or traumatic subluxation. The vertebral body heights are well maintained.  Disc spaces are preserved.  No significant central canal stenosis is demonstrated. The neural foramina are patent throughout. There is mild degenerative change. The paravertebral soft tissues are within normal  limits. LUMBAR SPINE: There is mild degenerative scoliosis as well as mild anterolisthesis at L4-5. The spine is in otherwise good alignment.  There is no fracture or traumatic subluxation. The vertebral body heights are well maintained.  There is mild disc space during throughout the lumbar spine.  No significant central canal stenosis is demonstrated.  The right L4-5 neural foramen is slightly narrowed.  The paravertebral soft tissues are within normal limits.    1. No evidence for acute injury to the chest, abdomen, pelvis, thoracic, or lumbar spine. 2. There is degenerative change in the lumbar spine as described. 3. There is a stable 6 mm nodule in the right upper lobe. Signed by Robert Oquendo MD    XR chest 1 view    Result Date: 3/1/2024  STUDY: Chest Radiograph;  3/1/24 at 5:47 PM INDICATION: Altered mental status. COMPARISON: Chest XR 1/23/24. ACCESSION NUMBER(S): [id number] ORDERING CLINICIAN: HUNTER R WIRE TECHNIQUE:  Frontal chest was obtained at 1746 hours. (two images) FINDINGS: CARDIOMEDIASTINAL SILHOUETTE: Cardiomediastinal silhouette is normal in size and configuration.  LUNGS: Lungs are clear.  ABDOMEN: No remarkable upper abdominal findings.  BONES: No acute osseous changes.    No acute pulmonary pathology. Signed by Robert Oquendo MD    XR pelvis 1-2 views    Result Date: 3/1/2024  STUDY: Pelvis Radiographs; 3/1/24 at 5:47 PM INDICATION: Left hip pain. COMPARISON: Left hip XR 4/30/16. ACCESSION NUMBER(S): [id number] ORDERING CLINICIAN: HUTNER R WIRE TECHNIQUE:  Two view(s) of the pelvis. FINDINGS:  The pelvic ring is intact.  There is no acute fracture. There are degenerative changes lower lumbosacral spine.  There is an inferior vena cava filter present.    No acute osseous abnormalities. Signed by Be Hare MD    CT head wo IV contrast    Result Date: 3/1/2024  Interpreted By:  Johnathon Higuera, STUDY: CT HEAD WO IV CONTRAST; CT CERVICAL SPINE WO IV CONTRAST; ;  3/1/2024 5:38 pm    INDICATION: Signs/Symptoms:ams possible fall.   COMPARISON: Noncontrast CT head of 05/05/2021. CT cervical spine of 06/04/2010.   ACCESSION NUMBER(S): [id number]; [id number]   ORDERING CLINICIAN: HUNTER ABBASI   TECHNIQUE: Noncontrast CT exams of the head and cervical spine with multiplanar reformations.   FINDINGS: BRAIN PARENCHYMA: Moderate to advanced volume loss.  There is periventricular and subcortical white matter hypoattenuation, most in keeping with chronic microvascular ischemic change. Cystic spaces in the bilateral corpus striatum probably representing chronic lacunar infarcts. Gray-white matter interfaces are preserved. No mass, mass effect or midline shift.   HEMORRHAGE: No acute intracranial hemorrhage. VENTRICLES and EXTRA-AXIAL SPACES: Normal size. EXTRACRANIAL SOFT TISSUES: Within normal limits. PARANASAL SINUSES/MASTOIDS: The visualized paranasal sinuses and mastoid air cells are aerated. CALVARIUM: No depressed skull fracture. No destructive osseous lesion.   OTHER FINDINGS: None.   CERVICAL SPINE:   Mild reversal of the normal cervical lordosis could reflect positioning or muscle spasm. ALIGNMENT: New trace degenerative anterolisthesis at C2-C3. Alignment is otherwise normal. VERTEBRAE: Acute fracture. Vertebral stature is maintained. Redemonstrated ACDF changes at C5-C7. Severe discogenic degeneration with endplate irregularity and osteophytosis and uncovertebral hypertrophy at C3-C4 and C4-C5. Moderate hypertrophic facet osteoarthropathy excepting on the right at C 2 C3 where it is severe in degree. SPINAL CANAL: No critical spinal canal stenosis. Moderate to severe degenerative canal narrowing at C4-C5 secondary to disc osteophyte bulging, similar to before. PREVERTEBRAL SOFT TISSUES: No prevertebral soft tissue swelling. LUNG APICES: Moderate to severe emphysema   OTHER FINDINGS: None.       No evidence of acute intracranial abnormality.   No acute cervical spine fracture or malalignment.      MACRO: None   Signed by: Johnathon Higuera 3/1/2024 6:09 PM Dictation workstation:   [id number]    CT cervical spine wo IV contrast    Result Date: 3/1/2024  Interpreted By:  Johnathon Higuera, STUDY: CT HEAD WO IV CONTRAST; CT CERVICAL SPINE WO IV CONTRAST; ;  3/1/2024 5:38 pm   INDICATION: Signs/Symptoms:ams possible fall.   COMPARISON: Noncontrast CT head of 05/05/2021. CT cervical spine of 06/04/2010.   ACCESSION NUMBER(S): [id number]; [id number]   ORDERING CLINICIAN: HUNTER ABBASI   TECHNIQUE: Noncontrast CT exams of the head and cervical spine with multiplanar reformations.   FINDINGS: BRAIN PARENCHYMA: Moderate to advanced volume loss.  There is periventricular and subcortical white matter hypoattenuation, most in keeping with chronic microvascular ischemic change. Cystic spaces in the bilateral corpus striatum probably representing chronic lacunar infarcts. Gray-white matter interfaces are preserved. No mass, mass effect or midline shift.   HEMORRHAGE: No acute intracranial hemorrhage. VENTRICLES and EXTRA-AXIAL SPACES: Normal size. EXTRACRANIAL SOFT TISSUES: Within normal limits. PARANASAL SINUSES/MASTOIDS: The visualized paranasal sinuses and mastoid air cells are aerated. CALVARIUM: No depressed skull fracture. No destructive osseous lesion.   OTHER FINDINGS: None.   CERVICAL SPINE:   Mild reversal of the normal cervical lordosis could reflect positioning or muscle spasm. ALIGNMENT: New trace degenerative anterolisthesis at C2-C3. Alignment is otherwise normal. VERTEBRAE: Acute fracture. Vertebral stature is maintained. Redemonstrated ACDF changes at C5-C7. Severe discogenic degeneration with endplate irregularity and osteophytosis and uncovertebral hypertrophy at C3-C4 and C4-C5. Moderate hypertrophic facet osteoarthropathy excepting on the right at C 2 C3 where it is severe in degree. SPINAL CANAL: No critical spinal canal stenosis. Moderate to severe degenerative canal narrowing at C4-C5 secondary to  disc osteophyte bulging, similar to before. PREVERTEBRAL SOFT TISSUES: No prevertebral soft tissue swelling. LUNG APICES: Moderate to severe emphysema   OTHER FINDINGS: None.       No evidence of acute intracranial abnormality.   No acute cervical spine fracture or malalignment.     MACRO: None   Signed by: Johnathon Higuera 3/1/2024 6:09 PM Dictation workstation:   [id number]  .      Assessment/Plan :  Very complex case with risks for stroke and     Metabolic encephalopathy with the following risks: polypharmacy and ABGs shows high CO2    Per Radha, MRI form cannot be filled due to lack of information and no one to sign the form form his family or other known to him    EEG on Monday    Avoid meds with sedation except Seroquel 12.5 mg BID to control agitation    Uric acid to eval for gout. He feels pain every where when I touched his joints    PD: Carbidopa and Levodopa 25/100 mg one pill TID, @ 7 AM, @ 1 PM and 5 PM    PD meds: to continue    Patient/Family Education: Extensive time was spent educating the patient on relevant anatomy, clinical findings and imaging, as well as discussing the potential diagnoses as discussed above.  Pharmacology: as above. Exercise: I discussed the importance of maintaining a daily exercise program, including stretching and strengthening. Preventative strategies were reviewed, specifically avoidance of any exercises that exacerbate pain.Return to online virtual visit/ clinic visit for follow-up with FABRIZIO Whatley in 12 weeks or sooner as needed.The patient expressed understanding and agreement with the assessment and plan.  Patient encouraged to contact us should they have any questions, concerns, or any changes in symptoms. Thank you for allowing me to participate in the care of your patient.** This note is created using speech recognition transcription software. Despite proofreading, several typographical errors might be present that might affect the meaning of the content. Please  call with any questions.**           Anshul Morales MD

## 2024-03-02 NOTE — CONSULTS
Inpatient consult to Neurology  Consult performed by: Anshul Morales MD  Consult ordered by: Lyndsay Augustine, APRN-CNP        Chief complaint:   AMS     History Of Present Illness  Chris Wu is a 68 y.o. male presenting with PD and risks for acute stroke- Cardiolipins antibodies and Factor V Leiden def and s/p IVC filter. Known case of PD and on sinemet one pill TID.    This times the patient got admitted with confusion and he has multiple meds that can cause sedation, I reviewed in admission ED notes.    He is unable to verbalize but able to open eyes and see around and moves all extremities. He c/o pain everywhere I touch him on joints.    Past Medical History  Past Medical History:   Diagnosis Date    Abnormal weight loss     Weight loss    Activated protein C resistance (CMS/HCC)     Heterozygous factor V Leiden mutation    Cervicalgia     Neck pain    Chronic sinusitis, unspecified     Sinusitis    Encounter for screening for malignant neoplasm of colon 03/18/2016    Encounter for screening for malignant neoplasm of colon    Muscle weakness (generalized)     Muscle weakness    Opioid use, unspecified, uncomplicated     Opioid use    Other conditions influencing health status     A Fall    Pain in unspecified limb     Limb pain    Pain in unspecified shoulder     Pain, joint, shoulder    Paresthesia of skin     Tingling    Personal history of other diseases of the nervous system and sense organs     History of migraine headaches    Personal history of other diseases of the respiratory system     History of pleurisy    Personal history of other mental and behavioral disorders     History of psychosis    Personal history of other specified conditions     History of nausea    Personal history of other specified conditions     History of dizziness    Personal history of other specified conditions     History of fatigue    Personal history of other venous thrombosis and embolism     History of deep venous  thrombosis    Personal history of pulmonary embolism     History of pulmonary embolism    Unspecified abdominal pain 04/15/2016    Abdominal spasms     Surgical History  Past Surgical History:   Procedure Laterality Date    BACK SURGERY  2013    Lower Back Surgery    NECK SURGERY  2013    Neck Surgery    OTHER SURGICAL HISTORY  04/15/2016    Interruption Inferior Vena Cava [place] Filter Placement     Social History  Social History     Tobacco Use    Smoking status: Former     Packs/day: 0.50     Years: 50.00     Additional pack years: 0.00     Total pack years: 25.00     Types: Cigarettes     Quit date: 2023     Years since quittin.5    Smokeless tobacco: Never   Vaping Use    Vaping Use: Some days    Substances: Nicotine    Devices: Disposable, Pre-filled or refillable cartridge, Refillable tank, Pre-filled pod   Substance Use Topics    Alcohol use: Never    Drug use: Never     Allergies  Aspirin  Medications Prior to Admission   Medication Sig Dispense Refill Last Dose    albuterol 1.25 mg/3 mL nebulizer solution INHALE 1 VIAL VIA NEBULIZER EVERY 4 HOURS AS NEEDED 75 mL 2     albuterol 90 mcg/actuation inhaler INHALE 1-2 PUFFS EVERY 4-6 HOURS AS NEEDED AND AS DIRECTED. 18 g 3     baclofen (Lioresal) 10 mg tablet Take by mouth 3 times a day as needed.       Breztri Aerosphere 160-9-4.8 mcg/actuation HFA aerosol inhaler INHALE 2 PUFFS 2 TIMES A DAY. 10.7 g 5     budesonide-formoteroL (Symbicort) 160-4.5 mcg/actuation inhaler Inhale every 12 hours.       buprenorphine-naloxone (Suboxone) 8-2 mg SL tablet Place 2 tablets under the tongue once daily.       carbidopa-levodopa (Sinemet)  mg tablet TAKE 1 TABLET BY MOUTH THREE TIMES A DAY 84 tablet 1     cholecalciferol (Vitamin D3) 1,250 mcg (50,000 unit) tablet 1 capsule.       cloNIDine (Catapres) 0.1 mg tablet Take 1 tablet (0.1 mg) by mouth 2 times a day.       diphenhydrAMINE-acetaminophen (Tylenol PM)  mg per tablet Take 30 mL  by mouth.       escitalopram (Lexapro) 20 mg tablet Take 1 tablet (20 mg) by mouth once daily. 90 tablet 1     gabapentin (Neurontin) 600 mg tablet TAKE 1 TABLET (600 MG) BY MOUTH 3 TIMES A DAY. 84 tablet 3     hydrocolloid dressing (DUODERM CGF EXTRA THIN TOP) Apply to affected areas once daily       hydrOXYzine HCL (Atarax) 25 mg tablet hydrOXYzine HCl - 25 MG Oral Tablet   Quantity: 14  Refills: 0        Start : 11-Apr-2022   Active       hydrOXYzine HCL (Atarax) 25 mg tablet Take 1 tablet (25 mg) by mouth 2 times a day.       hydrOXYzine pamoate (Vistaril) 25 mg capsule        ipratropium-albuteroL (Duo-Neb) 0.5-2.5 mg/3 mL nebulizer solution INHALE 1 UNIT DOSE FOUR TIMES A DAY AS NEEDED       meloxicam (Mobic) 15 mg tablet 1 tablet (15 mg) once daily as needed. With food       montelukast (Singulair) 10 mg tablet Take 1 tablet (10 mg) by mouth once daily.       naloxone (Narcan) 4 mg/0.1 mL nasal spray Administer 1 spray (4 mg) into affected nostril(s) if needed for opioid reversal. May repeat every 2-3 minutes if needed, alternating nostrils, until medical assistance becomes available. 2 each 0     omeprazole (PriLOSEC) 40 mg DR capsule TAKE 1 CAPSULE BY MOUTH EVERY MORNING BEFORE MEALS 28 capsule 3     oxazepam (Serax) 15 mg capsule Take 2 capsules (30 mg) by mouth 4 times a day as needed for anxiety. 120 capsule 0     oxygen (O2) gas therapy Inhale 4 L/min continuously. Indications: hypoxia       potassium chloride CR (Klor-Con M20) 20 mEq ER tablet Take 1 tablet (20 mEq) by mouth once daily. Do not crush or chew. 30 tablet 11     predniSONE (Deltasone) 20 mg tablet TAKE 1 TABLET (20 MG) BY MOUTH ONCE DAILY. 28 tablet 2     propranolol LA (Inderal LA) 160 mg 24 hr capsule Take 1 capsule (160 mg) by mouth once daily. Do not crush, chew, or split. 90 capsule 1     QUEtiapine (SEROquel) 25 mg tablet Take 1 tablet (25 mg) by mouth 4 times a day as needed (severe anxiety).       QUEtiapine (SeroqueL) 50 mg tablet  Take 1 tablet (50 mg) by mouth 4 times a day as needed (for anxiety). 320 tablet 5     torsemide (Demadex) 10 mg tablet Take 1 tablet (10 mg) by mouth once daily. 30 tablet 11     torsemide (Demadex) 10 mg tablet Take 1 tablet (10 mg) by mouth once daily.       torsemide (Demadex) 10 mg tablet Take 2 tablets (20 mg) by mouth once daily.       torsemide (Demadex) 20 mg tablet Take 1 tablet (20 mg) by mouth once daily. 14 tablet 0     traZODone (Desyrel) 150 mg tablet Take 0.5 tablets (75 mg) by mouth as needed at bedtime.          Review of Systems   Reason unable to perform ROS: AMS.     Neurological Exam  Mental Status  Awake, alert and oriented to person, place and time. Patient is nonverbal.    Cranial Nerves  CN II: Visual acuity is normal. Right funduscopic exam: disc intact. Left funduscopic exam: disc intact.  CN III, IV, VI: Extraocular movements intact bilaterally. Normal lids and orbits bilaterally.   Right pupil: Round. Reactive to light. Reactive to accommodation.  CN V:  Right: Facial sensation is normal.  Left: Facial sensation is normal on the left.  CN VII: Full and symmetric facial movement.  CN VIII:  Right: Hearing is normal.  Left: Hearing is normal.  CN IX, X:  Right: Palate is normal.  Left: Palate is normal.  CN XI:  Right: Sternocleidomastoid strength is normal.  Left: Sternocleidomastoid strength is normal.  CN XII: Tongue midline without atrophy or fasciculations.    Motor  Normal muscle bulk throughout. No fasciculations present. Normal muscle tone. No abnormal involuntary movements. Mild rigidity in the lower more than upper extremities and on wrist restraints.   Strength is 5/5 throughout all four extremities.    Sensory  Pinprick is normal in upper and lower extremities.     Reflexes                                            Right                      Left  Brachioradialis                    2+                         2+  Biceps                                 2+                         " 2+  Triceps                                2+                         2+  Jaw jerk absent.  Right pathological reflexes: Roberto Carlos's absent.  Left pathological reflexes: Roberto Carlos's absent.  Glabellar tap absent. Snout absent. Right palmomental absent. Left palmomental absent. Right palmar grasp absent. Left palmar grasp absent.    Physical Exam  HENT:      Right Ear: Hearing normal.      Left Ear: Hearing normal.   Eyes:      General: Lids are normal.      Extraocular Movements: Extraocular movements intact.   Neurological:      Motor: Motor strength is normal.     Deep Tendon Reflexes:      Reflex Scores:       Tricep reflexes are 2+ on the right side and 2+ on the left side.       Bicep reflexes are 2+ on the right side and 2+ on the left side.       Brachioradialis reflexes are 2+ on the right side and 2+ on the left side.      Last Recorded Vitals  Blood pressure 150/76, pulse 81, temperature 36.6 °C (97.9 °F), temperature source Temporal, resp. rate 20, height 1.75 m (5' 8.9\"), weight 80 kg (176 lb 5.9 oz), SpO2 98 %.    Relevant Results                    K[place] Coma Scale  Best Eye Response: Spontaneous  Best Verbal Response: Confused  Best Motor Response: Follows commands  [place] Coma Scale Score: 14                 I have personally reviewed the following imaging results CT angio head and neck w and wo IV contrast    Result Date: 3/1/2024  Interpreted By:  Johnathon Higuera, STUDY: CT ANGIO HEAD AND NECK W AND WO IV CONTRAST;  3/1/2024 11:21 pm   INDICATION: Signs/Symptoms:R/O STROKE.   COMPARISON: Correlation made to noncontrast head CT of 03/01/2024.   ACCESSION NUMBER(S): [id number]   ORDERING CLINICIAN: SUGEY HURTADO   TECHNIQUE: Unenhanced CT images of the head were obtained. Subsequently, 75 cc Omnipaque 350 were administered intravenously and axial images of the head and neck were acquired.  Coronal, sagittal, and 3-D reconstructions were provided for review.   FINDINGS: Noncontrast head CT: Moderate to " advanced volume loss.  There is periventricular and subcortical white matter hypoattenuation, most in keeping with chronic microvascular ischemic change. Gray-white matter differentiation is maintained. No evidence of acute intracranial hemorrhage. No mass, mass effect, midline shift, hydrocephalus, or abnormal extra-axial collection. Paranasal sinuses and mastoids are clear. Skull is within normal limits. Visible extracranial soft tissues including the orbits appear within normal limits.   CTA HEAD FINDINGS:   Anterior circulation: The bilateral intracranial internal carotid arteries, bilateral carotid terminals, bilateral proximal anterior and middle cerebral arteries are normal.   Posterior circulation: Bilateral intracranial vertebral arteries, vertebrobasilar junction, basilar artery and proximal posterior cerebral arteries are normal.   No intracranial saccular aneurysm or other abnormal intracranial enhancement is seen.   CTA NECK FINDINGS:   Right carotid vessels: Mild partially calcified plaque in the common carotid artery without significant luminal narrowing. Mild-to-moderate plaque in the bifurcation without significant luminal narrowing. Moderate to severe plaque in the carotid bulb causing narrowing of between 20 and 30% by NASCET criteria. The more distal cervical ICA is of normal caliber without hemodynamically significant luminal narrowing.   Left carotid vessels: Mild partially calcified plaque in the common carotid artery without significant luminal narrowing. Mild-to-moderate plaque in the bifurcation without significant luminal narrowing. Moderate to severe plaque in the carotid bulb causing narrowing of between 20 and 30% by NASCET criteria. The more distal cervical ICA is of normal caliber without hemodynamically significant luminal narrowing.   Vertebral vessels: Mild calcific plaque at the vertebral artery origins without significant narrowing. The visualized segments of the cervical  vertebral arteries are normal in caliber. Right vertebral artery is dominant.   ACDF changes noted. Severe emphysema. Biapical pleuroparenchymal scarring.       No evidence of acute intracranial hemorrhage or cortical infarct on noncontrast CT.   No evidence for hemodynamically significant stenosis of the cervical vessels. Mild atherosclerotic narrowing in the bilateral carotid bulbs measuring between 20 and 30% by NASCET criteria.   No evidence for significant stenosis or large branch vessel cutoffs of the intracranial vessels.   Incidentally noted severe emphysema in the visible lungs.   MACRO: None   Signed by: Johnathon Higuera 3/1/2024 11:46 PM Dictation workstation:   [id number]    CT thoracic spine wo IV contrast    Result Date: 3/1/2024  STUDY: CT Chest, Abdomen, and Pelvis with IV Contrast, CT Reconstruction Thoracic Spine and Lumbar Spine; 3/1/2024 at 5:58 p.m. INDICATION: Possible fall.  Altered mental status. COMPARISON: Two-view CXR 1/23/2024.  CT chest 8/1/2022.  US gallbladder 8/27/2020. ACCESSION NUMBER(S): [id number], [id number], [id number] ORDERING CLINICIAN: HUNTER ABBASI TECHNIQUE: CT of the chest, abdomen, and pelvis was performed.  Contiguous axial images were obtained at 3 mm slice thickness through the chest, abdomen, and pelvis.  Coronal and sagittal reconstructions at 3 mm slice thickness were performed.  Omnipaque 350 75 mL was administered intravenously.  Please note that spinal images were generated from the original CT abdomen and pelvis imaging. FINDINGS: CHEST: MEDIASTINUM: The heart is normal in size without pericardial effusion.  Central vascular structures opacify normally.  LUNGS/PLEURA: There is no pleural effusion, pleural thickening, or pneumothorax. The airways are patent. There is a 6 mm nodule in the right upper lobe. This was present on the previous examination and is unchanged. Lungs are clear without consolidation, interstitial disease, or suspicious nodules. LYMPH  NODES: Thoracic lymph nodes are not enlarged. ABDOMEN:  LIVER: No hepatomegaly.  Smooth surface contour.  Normal attenuation.  BILE DUCTS: No intrahepatic or extrahepatic biliary ductal dilatation.  GALLBLADDER: The gallbladder is unremarkable. STOMACH: No abnormalities identified.  PANCREAS: No masses or ductal dilatation.  SPLEEN: No splenomegaly or focal splenic lesion.  ADRENAL GLANDS: No thickening or nodules.  KIDNEYS AND URETERS: Kidneys are normal in size and location.  No renal or ureteral calculi.  PELVIS:  BLADDER: No abnormalities identified.  REPRODUCTIVE ORGANS: No abnormalities identified.  BOWEL: No abnormalities identified.  VESSELS: No abnormalities identified.  Abdominal aorta is normal in caliber.  PERITONEUM/RETROPERITONEUM/LYMPH NODES: No free fluid.  No pneumoperitoneum. No lymphadenopathy.  ABDOMINAL WALL: No abnormalities identified. SOFT TISSUES: No abnormalities identified.  BONES: No acute fracture or aggressive osseous lesion. THORACIC SPINE: The alignment is anatomic.  There is no fracture or traumatic subluxation. The vertebral body heights are well maintained.  Disc spaces are preserved.  No significant central canal stenosis is demonstrated. The neural foramina are patent throughout. There is mild degenerative change. The paravertebral soft tissues are within normal limits. LUMBAR SPINE: There is mild degenerative scoliosis as well as mild anterolisthesis at L4-5. The spine is in otherwise good alignment.  There is no fracture or traumatic subluxation. The vertebral body heights are well maintained.  There is mild disc space during throughout the lumbar spine.  No significant central canal stenosis is demonstrated.  The right L4-5 neural foramen is slightly narrowed.  The paravertebral soft tissues are within normal limits.    1. No evidence for acute injury to the chest, abdomen, pelvis, thoracic, or lumbar spine. 2. There is degenerative change in the lumbar spine as described. 3.  There is a stable 6 mm nodule in the right upper lobe. Signed by Robert Oquendo MD    CT lumbar spine wo IV contrast    Result Date: 3/1/2024  STUDY: CT Chest, Abdomen, and Pelvis with IV Contrast, CT Reconstruction Thoracic Spine and Lumbar Spine; 3/1/2024 at 5:58 p.m. INDICATION: Possible fall.  Altered mental status. COMPARISON: Two-view CXR 1/23/2024.  CT chest 8/1/2022.  US gallbladder 8/27/2020. ACCESSION NUMBER(S): [id number], [id number], [id number] ORDERING CLINICIAN: HUNTER ABBASI TECHNIQUE: CT of the chest, abdomen, and pelvis was performed.  Contiguous axial images were obtained at 3 mm slice thickness through the chest, abdomen, and pelvis.  Coronal and sagittal reconstructions at 3 mm slice thickness were performed.  Omnipaque 350 75 mL was administered intravenously.  Please note that spinal images were generated from the original CT abdomen and pelvis imaging. FINDINGS: CHEST: MEDIASTINUM: The heart is normal in size without pericardial effusion.  Central vascular structures opacify normally.  LUNGS/PLEURA: There is no pleural effusion, pleural thickening, or pneumothorax. The airways are patent. There is a 6 mm nodule in the right upper lobe. This was present on the previous examination and is unchanged. Lungs are clear without consolidation, interstitial disease, or suspicious nodules. LYMPH NODES: Thoracic lymph nodes are not enlarged. ABDOMEN:  LIVER: No hepatomegaly.  Smooth surface contour.  Normal attenuation.  BILE DUCTS: No intrahepatic or extrahepatic biliary ductal dilatation.  GALLBLADDER: The gallbladder is unremarkable. STOMACH: No abnormalities identified.  PANCREAS: No masses or ductal dilatation.  SPLEEN: No splenomegaly or focal splenic lesion.  ADRENAL GLANDS: No thickening or nodules.  KIDNEYS AND URETERS: Kidneys are normal in size and location.  No renal or ureteral calculi.  PELVIS:  BLADDER: No abnormalities identified.  REPRODUCTIVE ORGANS: No abnormalities identified.   BOWEL: No abnormalities identified.  VESSELS: No abnormalities identified.  Abdominal aorta is normal in caliber.  PERITONEUM/RETROPERITONEUM/LYMPH NODES: No free fluid.  No pneumoperitoneum. No lymphadenopathy.  ABDOMINAL WALL: No abnormalities identified. SOFT TISSUES: No abnormalities identified.  BONES: No acute fracture or aggressive osseous lesion. THORACIC SPINE: The alignment is anatomic.  There is no fracture or traumatic subluxation. The vertebral body heights are well maintained.  Disc spaces are preserved.  No significant central canal stenosis is demonstrated. The neural foramina are patent throughout. There is mild degenerative change. The paravertebral soft tissues are within normal limits. LUMBAR SPINE: There is mild degenerative scoliosis as well as mild anterolisthesis at L4-5. The spine is in otherwise good alignment.  There is no fracture or traumatic subluxation. The vertebral body heights are well maintained.  There is mild disc space during throughout the lumbar spine.  No significant central canal stenosis is demonstrated.  The right L4-5 neural foramen is slightly narrowed.  The paravertebral soft tissues are within normal limits.    1. No evidence for acute injury to the chest, abdomen, pelvis, thoracic, or lumbar spine. 2. There is degenerative change in the lumbar spine as described. 3. There is a stable 6 mm nodule in the right upper lobe. Signed by Robert Oquendo MD    CT chest abdomen pelvis w IV contrast    Result Date: 3/1/2024  STUDY: CT Chest, Abdomen, and Pelvis with IV Contrast, CT Reconstruction Thoracic Spine and Lumbar Spine; 3/1/2024 at 5:58 p.m. INDICATION: Possible fall.  Altered mental status. COMPARISON: Two-view CXR 1/23/2024.  CT chest 8/1/2022.  US gallbladder 8/27/2020. ACCESSION NUMBER(S): [id number], [id number], [id number] ORDERING CLINICIAN: HUNTER ABBASI TECHNIQUE: CT of the chest, abdomen, and pelvis was performed.  Contiguous axial images were obtained  at 3 mm slice thickness through the chest, abdomen, and pelvis.  Coronal and sagittal reconstructions at 3 mm slice thickness were performed.  Omnipaque 350 75 mL was administered intravenously.  Please note that spinal images were generated from the original CT abdomen and pelvis imaging. FINDINGS: CHEST: MEDIASTINUM: The heart is normal in size without pericardial effusion.  Central vascular structures opacify normally.  LUNGS/PLEURA: There is no pleural effusion, pleural thickening, or pneumothorax. The airways are patent. There is a 6 mm nodule in the right upper lobe. This was present on the previous examination and is unchanged. Lungs are clear without consolidation, interstitial disease, or suspicious nodules. LYMPH NODES: Thoracic lymph nodes are not enlarged. ABDOMEN:  LIVER: No hepatomegaly.  Smooth surface contour.  Normal attenuation.  BILE DUCTS: No intrahepatic or extrahepatic biliary ductal dilatation.  GALLBLADDER: The gallbladder is unremarkable. STOMACH: No abnormalities identified.  PANCREAS: No masses or ductal dilatation.  SPLEEN: No splenomegaly or focal splenic lesion.  ADRENAL GLANDS: No thickening or nodules.  KIDNEYS AND URETERS: Kidneys are normal in size and location.  No renal or ureteral calculi.  PELVIS:  BLADDER: No abnormalities identified.  REPRODUCTIVE ORGANS: No abnormalities identified.  BOWEL: No abnormalities identified.  VESSELS: No abnormalities identified.  Abdominal aorta is normal in caliber.  PERITONEUM/RETROPERITONEUM/LYMPH NODES: No free fluid.  No pneumoperitoneum. No lymphadenopathy.  ABDOMINAL WALL: No abnormalities identified. SOFT TISSUES: No abnormalities identified.  BONES: No acute fracture or aggressive osseous lesion. THORACIC SPINE: The alignment is anatomic.  There is no fracture or traumatic subluxation. The vertebral body heights are well maintained.  Disc spaces are preserved.  No significant central canal stenosis is demonstrated. The neural foramina are  patent throughout. There is mild degenerative change. The paravertebral soft tissues are within normal limits. LUMBAR SPINE: There is mild degenerative scoliosis as well as mild anterolisthesis at L4-5. The spine is in otherwise good alignment.  There is no fracture or traumatic subluxation. The vertebral body heights are well maintained.  There is mild disc space during throughout the lumbar spine.  No significant central canal stenosis is demonstrated.  The right L4-5 neural foramen is slightly narrowed.  The paravertebral soft tissues are within normal limits.    1. No evidence for acute injury to the chest, abdomen, pelvis, thoracic, or lumbar spine. 2. There is degenerative change in the lumbar spine as described. 3. There is a stable 6 mm nodule in the right upper lobe. Signed by Robert Oquendo MD    XR chest 1 view    Result Date: 3/1/2024  STUDY: Chest Radiograph;  3/1/24 at 5:47 PM INDICATION: Altered mental status. COMPARISON: Chest XR 1/23/24. ACCESSION NUMBER(S): [id number] ORDERING CLINICIAN: HUNTER R WIRE TECHNIQUE:  Frontal chest was obtained at 1746 hours. (two images) FINDINGS: CARDIOMEDIASTINAL SILHOUETTE: Cardiomediastinal silhouette is normal in size and configuration.  LUNGS: Lungs are clear.  ABDOMEN: No remarkable upper abdominal findings.  BONES: No acute osseous changes.    No acute pulmonary pathology. Signed by Robert Oquendo MD    XR pelvis 1-2 views    Result Date: 3/1/2024  STUDY: Pelvis Radiographs; 3/1/24 at 5:47 PM INDICATION: Left hip pain. COMPARISON: Left hip XR 4/30/16. ACCESSION NUMBER(S): [id number] ORDERING CLINICIAN: HUNTER R WIRE TECHNIQUE:  Two view(s) of the pelvis. FINDINGS:  The pelvic ring is intact.  There is no acute fracture. There are degenerative changes lower lumbosacral spine.  There is an inferior vena cava filter present.    No acute osseous abnormalities. Signed by Be Hare MD    CT head wo IV contrast    Result Date: 3/1/2024  Interpreted By:   Johnathon Higuera, STUDY: CT HEAD WO IV CONTRAST; CT CERVICAL SPINE WO IV CONTRAST; ;  3/1/2024 5:38 pm   INDICATION: Signs/Symptoms:ams possible fall.   COMPARISON: Noncontrast CT head of 05/05/2021. CT cervical spine of 06/04/2010.   ACCESSION NUMBER(S): [id number]; [id number]   ORDERING CLINICIAN: HUNTER ABBASI   TECHNIQUE: Noncontrast CT exams of the head and cervical spine with multiplanar reformations.   FINDINGS: BRAIN PARENCHYMA: Moderate to advanced volume loss.  There is periventricular and subcortical white matter hypoattenuation, most in keeping with chronic microvascular ischemic change. Cystic spaces in the bilateral corpus striatum probably representing chronic lacunar infarcts. Gray-white matter interfaces are preserved. No mass, mass effect or midline shift.   HEMORRHAGE: No acute intracranial hemorrhage. VENTRICLES and EXTRA-AXIAL SPACES: Normal size. EXTRACRANIAL SOFT TISSUES: Within normal limits. PARANASAL SINUSES/MASTOIDS: The visualized paranasal sinuses and mastoid air cells are aerated. CALVARIUM: No depressed skull fracture. No destructive osseous lesion.   OTHER FINDINGS: None.   CERVICAL SPINE:   Mild reversal of the normal cervical lordosis could reflect positioning or muscle spasm. ALIGNMENT: New trace degenerative anterolisthesis at C2-C3. Alignment is otherwise normal. VERTEBRAE: Acute fracture. Vertebral stature is maintained. Redemonstrated ACDF changes at C5-C7. Severe discogenic degeneration with endplate irregularity and osteophytosis and uncovertebral hypertrophy at C3-C4 and C4-C5. Moderate hypertrophic facet osteoarthropathy excepting on the right at C 2 C3 where it is severe in degree. SPINAL CANAL: No critical spinal canal stenosis. Moderate to severe degenerative canal narrowing at C4-C5 secondary to disc osteophyte bulging, similar to before. PREVERTEBRAL SOFT TISSUES: No prevertebral soft tissue swelling. LUNG APICES: Moderate to severe emphysema   OTHER FINDINGS: None.        No evidence of acute intracranial abnormality.   No acute cervical spine fracture or malalignment.     MACRO: None   Signed by: Johnathon Higuera 3/1/2024 6:09 PM Dictation workstation:   [id number]    CT cervical spine wo IV contrast    Result Date: 3/1/2024  Interpreted By:  Johnathon Higuera, STUDY: CT HEAD WO IV CONTRAST; CT CERVICAL SPINE WO IV CONTRAST; ;  3/1/2024 5:38 pm   INDICATION: Signs/Symptoms:ams possible fall.   COMPARISON: Noncontrast CT head of 05/05/2021. CT cervical spine of 06/04/2010.   ACCESSION NUMBER(S): [id number]; [id number]   ORDERING CLINICIAN: HUNTER ABBASI   TECHNIQUE: Noncontrast CT exams of the head and cervical spine with multiplanar reformations.   FINDINGS: BRAIN PARENCHYMA: Moderate to advanced volume loss.  There is periventricular and subcortical white matter hypoattenuation, most in keeping with chronic microvascular ischemic change. Cystic spaces in the bilateral corpus striatum probably representing chronic lacunar infarcts. Gray-white matter interfaces are preserved. No mass, mass effect or midline shift.   HEMORRHAGE: No acute intracranial hemorrhage. VENTRICLES and EXTRA-AXIAL SPACES: Normal size. EXTRACRANIAL SOFT TISSUES: Within normal limits. PARANASAL SINUSES/MASTOIDS: The visualized paranasal sinuses and mastoid air cells are aerated. CALVARIUM: No depressed skull fracture. No destructive osseous lesion.   OTHER FINDINGS: None.   CERVICAL SPINE:   Mild reversal of the normal cervical lordosis could reflect positioning or muscle spasm. ALIGNMENT: New trace degenerative anterolisthesis at C2-C3. Alignment is otherwise normal. VERTEBRAE: Acute fracture. Vertebral stature is maintained. Redemonstrated ACDF changes at C5-C7. Severe discogenic degeneration with endplate irregularity and osteophytosis and uncovertebral hypertrophy at C3-C4 and C4-C5. Moderate hypertrophic facet osteoarthropathy excepting on the right at C 2 C3 where it is severe in degree. SPINAL CANAL: No  critical spinal canal stenosis. Moderate to severe degenerative canal narrowing at C4-C5 secondary to disc osteophyte bulging, similar to before. PREVERTEBRAL SOFT TISSUES: No prevertebral soft tissue swelling. LUNG APICES: Moderate to severe emphysema   OTHER FINDINGS: None.       No evidence of acute intracranial abnormality.   No acute cervical spine fracture or malalignment.     MACRO: None   Signed by: Johnathon Higuera 3/1/2024 6:09 PM Dictation workstation:   [id number]  .      Assessment/Plan   Principal Problem:    Encephalopathy    Risks: Polypharmacy    PD: Sinemet one pill at 7 AM, one pill at 1 Pma nd one pill at 5PM  Stop all meds with sedation except Seroquel 12.5 mg BID, PO    Will follow prn    MRI brain is someone can help to fill the information,Per oncall RN it has been difficult to reach out to family to get the form filled.    If did not wake up then on Monday Routine EEG    Screen for gout: he has been hurting all over body when I examined him    Patient/Family Education: Extensive time was spent educating the patient on relevant anatomy, clinical findings and imaging, as well as discussing the potential diagnoses as discussed above.  Pharmacology: as above. Exercise: I discussed the importance of maintaining a daily exercise program, including stretching and strengthening. Preventative strategies were reviewed, specifically avoidance of any exercises that exacerbate pain.Return to online virtual visit/ clinic visit for follow-up with FABRIZIO Whatley in 2-3 weeks or sooner as needed.The patient expressed understanding and agreement with the assessment and plan.  Patient encouraged to contact us should they have any questions, concerns, or any changes in symptoms. Thank you for allowing me to participate in the care of your patient.** This note is created using speech recognition transcription software. Despite proofreading, several typographical errors might be present that might affect the  meaning of the content. Please call with any questions.**          Anshul Morales MD

## 2024-03-03 LAB
ALBUMIN SERPL BCP-MCNC: 3.5 G/DL (ref 3.4–5)
ALP SERPL-CCNC: 42 U/L (ref 33–136)
ALT SERPL W P-5'-P-CCNC: 6 U/L (ref 10–52)
ANION GAP SERPL CALC-SCNC: 11 MMOL/L (ref 10–20)
AST SERPL W P-5'-P-CCNC: 17 U/L (ref 9–39)
BILIRUB SERPL-MCNC: 0.8 MG/DL (ref 0–1.2)
BUN SERPL-MCNC: 11 MG/DL (ref 6–23)
CALCIUM SERPL-MCNC: 8.7 MG/DL (ref 8.6–10.3)
CHLORIDE SERPL-SCNC: 95 MMOL/L (ref 98–107)
CO2 SERPL-SCNC: 36 MMOL/L (ref 21–32)
CREAT SERPL-MCNC: 0.65 MG/DL (ref 0.5–1.3)
EGFRCR SERPLBLD CKD-EPI 2021: >90 ML/MIN/1.73M*2
ERYTHROCYTE [DISTWIDTH] IN BLOOD BY AUTOMATED COUNT: 12.3 % (ref 11.5–14.5)
GLUCOSE SERPL-MCNC: 88 MG/DL (ref 74–99)
HCT VFR BLD AUTO: 37.6 % (ref 41–52)
HGB BLD-MCNC: 11.7 G/DL (ref 13.5–17.5)
INR PPP: 1.2 (ref 0.9–1.1)
MCH RBC QN AUTO: 28.3 PG (ref 26–34)
MCHC RBC AUTO-ENTMCNC: 31.1 G/DL (ref 32–36)
MCV RBC AUTO: 91 FL (ref 80–100)
NRBC BLD-RTO: 0 /100 WBCS (ref 0–0)
PLATELET # BLD AUTO: 149 X10*3/UL (ref 150–450)
POTASSIUM SERPL-SCNC: 3.4 MMOL/L (ref 3.5–5.3)
PROT SERPL-MCNC: 6.1 G/DL (ref 6.4–8.2)
PROTHROMBIN TIME: 13.2 SECONDS (ref 9.8–12.8)
RBC # BLD AUTO: 4.13 X10*6/UL (ref 4.5–5.9)
SODIUM SERPL-SCNC: 139 MMOL/L (ref 136–145)
WBC # BLD AUTO: 6 X10*3/UL (ref 4.4–11.3)

## 2024-03-03 PROCEDURE — 99233 SBSQ HOSP IP/OBS HIGH 50: CPT | Performed by: FAMILY MEDICINE

## 2024-03-03 PROCEDURE — 2500000001 HC RX 250 WO HCPCS SELF ADMINISTERED DRUGS (ALT 637 FOR MEDICARE OP): Performed by: FAMILY MEDICINE

## 2024-03-03 PROCEDURE — 2500000002 HC RX 250 W HCPCS SELF ADMINISTERED DRUGS (ALT 637 FOR MEDICARE OP, ALT 636 FOR OP/ED): Performed by: FAMILY MEDICINE

## 2024-03-03 PROCEDURE — 1200000002 HC GENERAL ROOM WITH TELEMETRY DAILY

## 2024-03-03 PROCEDURE — 80053 COMPREHEN METABOLIC PANEL: CPT | Performed by: FAMILY MEDICINE

## 2024-03-03 PROCEDURE — 2500000002 HC RX 250 W HCPCS SELF ADMINISTERED DRUGS (ALT 637 FOR MEDICARE OP, ALT 636 FOR OP/ED): Mod: MUE | Performed by: NURSE PRACTITIONER

## 2024-03-03 PROCEDURE — 2500000005 HC RX 250 GENERAL PHARMACY W/O HCPCS: Performed by: NURSE PRACTITIONER

## 2024-03-03 PROCEDURE — 36415 COLL VENOUS BLD VENIPUNCTURE: CPT | Performed by: FAMILY MEDICINE

## 2024-03-03 PROCEDURE — S4991 NICOTINE PATCH NONLEGEND: HCPCS | Performed by: FAMILY MEDICINE

## 2024-03-03 PROCEDURE — 85610 PROTHROMBIN TIME: CPT | Performed by: FAMILY MEDICINE

## 2024-03-03 PROCEDURE — 2500000004 HC RX 250 GENERAL PHARMACY W/ HCPCS (ALT 636 FOR OP/ED): Performed by: NURSE PRACTITIONER

## 2024-03-03 PROCEDURE — 2500000004 HC RX 250 GENERAL PHARMACY W/ HCPCS (ALT 636 FOR OP/ED): Performed by: FAMILY MEDICINE

## 2024-03-03 PROCEDURE — 2500000001 HC RX 250 WO HCPCS SELF ADMINISTERED DRUGS (ALT 637 FOR MEDICARE OP): Performed by: NURSE PRACTITIONER

## 2024-03-03 PROCEDURE — 85027 COMPLETE CBC AUTOMATED: CPT | Performed by: FAMILY MEDICINE

## 2024-03-03 RX ORDER — BUPRENORPHINE HYDROCHLORIDE AND NALOXONE HYDROCHLORIDE DIHYDRATE 8; 2 MG/1; MG/1
1 TABLET SUBLINGUAL 2 TIMES DAILY
Status: DISCONTINUED | OUTPATIENT
Start: 2024-03-03 | End: 2024-03-18 | Stop reason: HOSPADM

## 2024-03-03 RX ORDER — LORAZEPAM 2 MG/ML
0.5 INJECTION INTRAMUSCULAR EVERY 2 HOUR PRN
Status: DISCONTINUED | OUTPATIENT
Start: 2024-03-03 | End: 2024-03-03

## 2024-03-03 RX ORDER — LORAZEPAM 1 MG/1
2 TABLET ORAL EVERY 6 HOURS SCHEDULED
Status: DISCONTINUED | OUTPATIENT
Start: 2024-03-03 | End: 2024-03-04

## 2024-03-03 RX ORDER — ESCITALOPRAM OXALATE 10 MG/1
10 TABLET ORAL DAILY
Status: DISCONTINUED | OUTPATIENT
Start: 2024-03-03 | End: 2024-03-03

## 2024-03-03 RX ORDER — ESCITALOPRAM OXALATE 20 MG/1
20 TABLET ORAL DAILY
Status: DISCONTINUED | OUTPATIENT
Start: 2024-03-03 | End: 2024-03-18 | Stop reason: HOSPADM

## 2024-03-03 RX ORDER — LORAZEPAM 2 MG/ML
1 INJECTION INTRAMUSCULAR EVERY 2 HOUR PRN
Status: DISCONTINUED | OUTPATIENT
Start: 2024-03-03 | End: 2024-03-03

## 2024-03-03 RX ORDER — QUETIAPINE FUMARATE 25 MG/1
25 TABLET, FILM COATED ORAL 4 TIMES DAILY PRN
Status: DISCONTINUED | OUTPATIENT
Start: 2024-03-03 | End: 2024-03-18 | Stop reason: HOSPADM

## 2024-03-03 RX ORDER — BACLOFEN 10 MG/1
5 TABLET ORAL 3 TIMES DAILY
Status: DISCONTINUED | OUTPATIENT
Start: 2024-03-03 | End: 2024-03-04

## 2024-03-03 RX ORDER — GABAPENTIN 300 MG/1
300 CAPSULE ORAL 3 TIMES DAILY
Status: DISCONTINUED | OUTPATIENT
Start: 2024-03-03 | End: 2024-03-18 | Stop reason: HOSPADM

## 2024-03-03 RX ORDER — LORAZEPAM 2 MG/ML
2 INJECTION INTRAMUSCULAR EVERY 2 HOUR PRN
Status: DISCONTINUED | OUTPATIENT
Start: 2024-03-03 | End: 2024-03-03

## 2024-03-03 RX ORDER — TRAZODONE HYDROCHLORIDE 50 MG/1
100 TABLET ORAL NIGHTLY
Status: DISCONTINUED | OUTPATIENT
Start: 2024-03-03 | End: 2024-03-18 | Stop reason: HOSPADM

## 2024-03-03 RX ORDER — IBUPROFEN 200 MG
1 TABLET ORAL DAILY
Status: DISCONTINUED | OUTPATIENT
Start: 2024-03-03 | End: 2024-03-18 | Stop reason: HOSPADM

## 2024-03-03 RX ADMIN — NICOTINE 1 PATCH: 21 PATCH, EXTENDED RELEASE TRANSDERMAL at 14:22

## 2024-03-03 RX ADMIN — BACLOFEN 5 MG: 10 TABLET ORAL at 15:36

## 2024-03-03 RX ADMIN — ESCITALOPRAM OXALATE 20 MG: 20 TABLET ORAL at 15:36

## 2024-03-03 RX ADMIN — TIOTROPIUM BROMIDE INHALATION SPRAY 2 PUFF: 3.12 SPRAY, METERED RESPIRATORY (INHALATION) at 10:04

## 2024-03-03 RX ADMIN — BUPRENORPHINE AND NALOXONE 1 TABLET: 8; 2 TABLET SUBLINGUAL at 15:36

## 2024-03-03 RX ADMIN — ESCITALOPRAM OXALATE 10 MG: 10 TABLET ORAL at 10:02

## 2024-03-03 RX ADMIN — GABAPENTIN 300 MG: 300 CAPSULE ORAL at 21:20

## 2024-03-03 RX ADMIN — PREDNISONE 20 MG: 20 TABLET ORAL at 10:02

## 2024-03-03 RX ADMIN — Medication 1 TABLET: at 10:01

## 2024-03-03 RX ADMIN — ASPIRIN 150 MG: 300 SUPPOSITORY RECTAL at 10:02

## 2024-03-03 RX ADMIN — CARBIDOPA AND LEVODOPA 1 TABLET: 25; 100 TABLET ORAL at 06:12

## 2024-03-03 RX ADMIN — TRAZODONE HYDROCHLORIDE 100 MG: 50 TABLET ORAL at 21:20

## 2024-03-03 RX ADMIN — PANTOPRAZOLE SODIUM 40 MG: 40 TABLET, DELAYED RELEASE ORAL at 06:12

## 2024-03-03 RX ADMIN — THIAMINE HCL TAB 100 MG 100 MG: 100 TAB at 10:02

## 2024-03-03 RX ADMIN — FOLIC ACID 1 MG: 1 TABLET ORAL at 10:01

## 2024-03-03 RX ADMIN — DOCUSATE SODIUM 100 MG: 100 CAPSULE, LIQUID FILLED ORAL at 21:21

## 2024-03-03 RX ADMIN — CARBIDOPA AND LEVODOPA 1 TABLET: 25; 100 TABLET ORAL at 13:51

## 2024-03-03 RX ADMIN — ENOXAPARIN SODIUM 40 MG: 40 INJECTION SUBCUTANEOUS at 10:01

## 2024-03-03 RX ADMIN — CARBIDOPA AND LEVODOPA 1 TABLET: 25; 100 TABLET ORAL at 17:35

## 2024-03-03 RX ADMIN — LORAZEPAM 2 MG: 2 INJECTION INTRAMUSCULAR; INTRAVENOUS at 14:44

## 2024-03-03 RX ADMIN — TORSEMIDE 10 MG: 20 TABLET ORAL at 10:02

## 2024-03-03 RX ADMIN — POTASSIUM CHLORIDE 20 MEQ: 1.5 FOR SOLUTION ORAL at 10:01

## 2024-03-03 RX ADMIN — GABAPENTIN 300 MG: 300 CAPSULE ORAL at 15:36

## 2024-03-03 RX ADMIN — LORAZEPAM 2 MG: 1 TABLET ORAL at 17:33

## 2024-03-03 RX ADMIN — BUPRENORPHINE AND NALOXONE 1 TABLET: 8; 2 TABLET SUBLINGUAL at 21:18

## 2024-03-03 RX ADMIN — BACLOFEN 5 MG: 10 TABLET ORAL at 21:18

## 2024-03-03 RX ADMIN — Medication 2 L/MIN: at 07:59

## 2024-03-03 RX ADMIN — FLUTICASONE FUROATE AND VILANTEROL TRIFENATATE 1 PUFF: 200; 25 POWDER RESPIRATORY (INHALATION) at 10:04

## 2024-03-03 RX ADMIN — ATORVASTATIN CALCIUM 80 MG: 80 TABLET, FILM COATED ORAL at 21:20

## 2024-03-03 RX ADMIN — DOCUSATE SODIUM 100 MG: 100 CAPSULE, LIQUID FILLED ORAL at 10:02

## 2024-03-03 ASSESSMENT — LIFESTYLE VARIABLES
AUDITORY DISTURBANCES: NOT PRESENT
PAROXYSMAL SWEATS: NO SWEAT VISIBLE
TOTAL SCORE: 4
AGITATION: NORMAL ACTIVITY
TREMOR: NO TREMOR
ANXIETY: 2
ORIENTATION AND CLOUDING OF SENSORIUM: DISORIENTED FOR PLACE OR PERSON
TOTAL SCORE: 5
NAUSEA AND VOMITING: NO NAUSEA AND NO VOMITING
VISUAL DISTURBANCES: NOT PRESENT
AGITATION: SOMEWHAT MORE THAN NORMAL ACTIVITY
PULSE: 98
PULSE: 102
TREMOR: NO TREMOR
VISUAL DISTURBANCES: NOT PRESENT
ORIENTATION AND CLOUDING OF SENSORIUM: DISORIENTED FOR PLACE OR PERSON
HEADACHE, FULLNESS IN HEAD: NOT PRESENT
VISUAL DISTURBANCES: NOT PRESENT
PAROXYSMAL SWEATS: NO SWEAT VISIBLE
VISUAL DISTURBANCES: NOT PRESENT
TREMOR: NO TREMOR
ORIENTATION AND CLOUDING OF SENSORIUM: DISORIENTED FOR PLACE OR PERSON
HEADACHE, FULLNESS IN HEAD: NOT PRESENT
ANXIETY: MILDLY ANXIOUS
TREMOR: 3
NAUSEA AND VOMITING: NO NAUSEA AND NO VOMITING
VISUAL DISTURBANCES: NOT PRESENT
AUDITORY DISTURBANCES: NOT PRESENT
ANXIETY: MILDLY ANXIOUS
ANXIETY: MILDLY ANXIOUS
NAUSEA AND VOMITING: NO NAUSEA AND NO VOMITING
ANXIETY: MODERATELY ANXIOUS, OR GUARDED, SO ANXIETY IS INFERRED
AGITATION: NORMAL ACTIVITY
ORIENTATION AND CLOUDING OF SENSORIUM: DISORIENTED FOR DATE BY MORE THAN 2 CALENDAR DAYS
ORIENTATION AND CLOUDING OF SENSORIUM: DISORIENTED FOR DATE BY MORE THAN 2 CALENDAR DAYS
TOTAL SCORE: 6
TOTAL SCORE: 5
AUDITORY DISTURBANCES: NOT PRESENT
TOTAL SCORE: 15
NAUSEA AND VOMITING: NO NAUSEA AND NO VOMITING
AUDITORY DISTURBANCES: NOT PRESENT
TREMOR: NO TREMOR
HEADACHE, FULLNESS IN HEAD: NOT PRESENT
PULSE: 98
HEADACHE, FULLNESS IN HEAD: NOT PRESENT
PAROXYSMAL SWEATS: NO SWEAT VISIBLE
AUDITORY DISTURBANCES: NOT PRESENT
NAUSEA AND VOMITING: NO NAUSEA AND NO VOMITING
AGITATION: MODERATELY FIDGETY AND RESTLESS
PAROXYSMAL SWEATS: NO SWEAT VISIBLE
PAROXYSMAL SWEATS: NO SWEAT VISIBLE
HEADACHE, FULLNESS IN HEAD: NOT PRESENT
AGITATION: NORMAL ACTIVITY

## 2024-03-03 ASSESSMENT — COGNITIVE AND FUNCTIONAL STATUS - GENERAL
EATING MEALS: A LITTLE
DRESSING REGULAR UPPER BODY CLOTHING: A LOT
WALKING IN HOSPITAL ROOM: TOTAL
WALKING IN HOSPITAL ROOM: TOTAL
HELP NEEDED FOR BATHING: A LOT
MOBILITY SCORE: 11
STANDING UP FROM CHAIR USING ARMS: A LOT
HELP NEEDED FOR BATHING: A LOT
TURNING FROM BACK TO SIDE WHILE IN FLAT BAD: A LOT
DRESSING REGULAR UPPER BODY CLOTHING: A LOT
TURNING FROM BACK TO SIDE WHILE IN FLAT BAD: A LOT
PERSONAL GROOMING: A LOT
MOBILITY SCORE: 11
DAILY ACTIVITIY SCORE: 14
MOVING TO AND FROM BED TO CHAIR: A LOT
EATING MEALS: A LITTLE
MOVING FROM LYING ON BACK TO SITTING ON SIDE OF FLAT BED WITH BEDRAILS: A LITTLE
MOVING FROM LYING ON BACK TO SITTING ON SIDE OF FLAT BED WITH BEDRAILS: A LITTLE
DRESSING REGULAR LOWER BODY CLOTHING: A LOT
TOILETING: A LITTLE
PERSONAL GROOMING: A LOT
CLIMB 3 TO 5 STEPS WITH RAILING: TOTAL
MOVING TO AND FROM BED TO CHAIR: A LOT
STANDING UP FROM CHAIR USING ARMS: A LOT
DRESSING REGULAR LOWER BODY CLOTHING: A LOT
TOILETING: A LITTLE
DAILY ACTIVITIY SCORE: 14
CLIMB 3 TO 5 STEPS WITH RAILING: TOTAL

## 2024-03-03 ASSESSMENT — ACTIVITIES OF DAILY LIVING (ADL): LACK_OF_TRANSPORTATION: NO

## 2024-03-03 ASSESSMENT — PAIN SCALES - GENERAL
PAINLEVEL_OUTOF10: 0 - NO PAIN
PAINLEVEL_OUTOF10: 0 - NO PAIN

## 2024-03-03 ASSESSMENT — PAIN - FUNCTIONAL ASSESSMENT: PAIN_FUNCTIONAL_ASSESSMENT: 0-10

## 2024-03-03 NOTE — PROGRESS NOTES
"   03/03/24 0801   Discharge Planning   Living Arrangements Alone   Support Systems Buddhism/beatrice community;Friends/neighbors   Assistance Needed Spoke to sister Rahel to obtain baseline(sister lives 4 hours away and provided information as best she knew).At baseline patient is A&Ox3, forgetful, uses a cane for ambulation, feeds self, has help getting groceries, someone from [place] delivers packaged medication for patient weekly, wears O2 continuous but unsure of liters or provider. Patient has a good friend \"Rib\" that checks on him daily.   Type of Residence Private residence   Number of Stairs to Enter Residence 2   Number of Stairs Within Residence 1  (1 step down into bathroom)   Do you have animals or pets at home? Yes   Type of Animals or Pets 1 cat   Who is requesting discharge planning? Provider   Home or Post Acute Services Other (Comment)  (TBD)   Patient expects to be discharged to: TBD- patient will need therapy eval when appropriate, SW following(please see SW note as well)   Does the patient need discharge transport arranged? Yes   RoundTrip coordination needed? Yes   Has discharge transport been arranged? No   Financial Resource Strain   How hard is it for you to pay for the very basics like food, housing, medical care, and heating? Pt Unable   Housing Stability   In the last 12 months, was there a time when you were not able to pay the mortgage or rent on time? Pt Unable   In the last 12 months, how many places have you lived? 1   In the last 12 months, was there a time when you did not have a steady place to sleep or slept in a shelter (including now)? N   Transportation Needs   In the past 12 months, has lack of transportation kept you from medical appointments or from getting medications? no   In the past 12 months, has lack of transportation kept you from meetings, work, or from getting things needed for daily living? No       "

## 2024-03-03 NOTE — CARE PLAN
Problem: Respiratory  Goal: No signs of respiratory distress (eg. Use of accessory muscles. Peds grunting)  Outcome: Progressing      The clinical goals for the shift include patients mentation will improve this shift    Over the shift, the patient did not make progress toward the following goals. Barriers to progression include patient is confused. Recommendations to address these barriers include 1:1 at bedside, restraints in place.

## 2024-03-03 NOTE — PROGRESS NOTES
Physical Therapy                 Therapy Communication Note    Patient Name: Chris Wu  MRN: [id number]  Today's Date: 3/3/2024     Discipline: Physical Therapy    Missed Visit Reason: Missed Visit Reason:  (MD at bedside for assessment)    Missed Time: Attempt

## 2024-03-03 NOTE — PROGRESS NOTES
Occupational Therapy                 Therapy Communication Note    Patient Name: Chris Wu  MRN: [id number]  Today's Date: 3/3/2024     Discipline: Occupational Therapy    Missed Visit Reason: OT attempt for evaluation; pt restraints removed. Sitter at bedside.  MD in pt room for assessment for extended period of time.  Nursing aware of attempt.      2nd attempt at 1552; pt confused and inappropriate for OT evaluation

## 2024-03-03 NOTE — PROGRESS NOTES
Chris Wu is a 68 y.o. male on day 2 of admission presenting with Encephalopathy.      Subjective   Patient was found comfortably this morning.       Objective     Last Recorded Vitals  /75 (BP Location: Left arm, Patient Position: Sitting)   Pulse 81   Temp 36.6 °C (97.9 °F) (Temporal)   Resp 16   Wt 80 kg (176 lb 5.9 oz)   SpO2 96%   Intake/Output last 3 Shifts:    Intake/Output Summary (Last 24 hours) at 3/3/2024 0913  Last data filed at 3/3/2024 0520  Gross per 24 hour   Intake --   Output 1327 ml   Net -1327 ml         Admission Weight  Weight: 72.6 kg (160 lb) (03/01/24 1533)    Daily Weight  03/01/24 : 80 kg (176 lb 5.9 oz)    Image Results  Lower extremity venous duplex bilateral  Narrative: Interpreted By:  Blu Xiong,   STUDY:  [facility] US LOWER EXTREMITY VENOUS DUPLEX BILATERAL  3/2/2024 7:48 pm      INDICATION:  67 y/o   M with  Signs/Symptoms:r/o DVT. LMP:  Unknown.      COMPARISON:  None.      ACCESSION NUMBER(S):  [id number]      ORDERING CLINICIAN:  SUGEY HURTADO      TECHNIQUE:  Routine ultrasound of the  right lower extremity was performed with  duplex Doppler (color and spectral) evaluation.   Static images were  obtained for remote interpretation.      FINDINGS:  Examination significantly limited due to patient condition.      Patency demonstrated from the left femoral vein proximally down to  below the knee.          Impression: Limited examination due to patient condition. No DVT detected      MACRO:  None      Signed by: Blu Xiong 3/2/2024 8:18 PM  Dictation workstation:   [id number]      Physical Exam  Constitutional:       Appearance: Normal appearance.      Comments: Agitated   HENT:      Head: Normocephalic and atraumatic.      Nose: Nose normal.      Mouth/Throat:      Mouth: Mucous membranes are dry.   Eyes:      Extraocular Movements: Extraocular movements intact.      Conjunctiva/sclera: Conjunctivae normal.   Cardiovascular:      Rate and Rhythm: Normal rate  and regular rhythm.      Pulses: Normal pulses.      Heart sounds: Normal heart sounds.   Pulmonary:      Effort: Pulmonary effort is normal.      Breath sounds: Normal breath sounds.   Abdominal:      General: Abdomen is flat. Bowel sounds are normal.      Palpations: Abdomen is soft.   Musculoskeletal:         General: Normal range of motion.      Cervical back: Normal range of motion.   Skin:     General: Skin is warm.      Capillary Refill: Capillary refill takes less than 2 seconds.   Neurological:      Mental Status: He is alert. He is disoriented.      Comments: Patient is hard of hearing but able to state his name, that he is in the hospital and state the month was February, again     Psychiatric:         Behavior: Behavior is uncooperative.           Assessment/Plan      Chris Wu is a 68 y.o. male with a pertinent hx of COPD, Parkinson's disorder, history of factor V Leiden mutation with DVT and PE in the past & appears to not be on anticoagulation currently and is s/p IVC filter, depression/anxiety & PTSD & diastolic CHF who presented to D due to altered mental status,     Altered mental status   Patient's mentation improved and upon waking up with a BiPAP he became agitated.  Rapid response was called and patient responded to a 2 mg dose of IV Ativan  On reevaluation patient is hard of hearing but has no focal deficits  Strongly suspect polypharmacy  I attempted to reach out to contacts in the system however was unable to reach anybody for collateral information, will repeat in a.m.  CTA of head and neck reveals no evidence of hemodynamically significant stenosis of the cervical vessels with mild arteriosclerotic narrowing of bilateral carotid bulbs and no evidence of significant stenosis of the large branch vessel cutoffs of the intracranial vessels  CT of the head showed no acute findings  CT of C-spine showed no acute findings  CT of chest/abdomen/pelvis w/ c 6 mm right upper lobe nodule  CT  of thoracic and lumbar spine showed no evidence of injury  Pelvic x-ray showed pelvic ring intact and no acute fractures  MRI pending  Concern for polypharmacy  Continue aspirin and statin  Continue neurochecks  Neurology consulted  Have added creatinine kinase    Polypharmacy  Likely cause of above  In review of chart patient is on multiple sedative medications including Seroquel, oxazepam, Vistaril, gabapentin, Suboxone, baclofen, trazodone, Lexapro and BuSpar  The patient's cousin and friend who also cares for the patient reported patient's mentation has significantly worsened over the past month when his medications were adjusted however cannot specify which medication was new  Decrease home medication of baclofen from 10 mg 3 times daily to 5 mg 3 times daily  Have restarted home medication of Suboxone 8/2 1 tab sublingual BID  Blood pressure has been controlled, will continue to hold clonidine 0.1 mg twice daily  Continue escitalopram 20mg every day   Decrease gabapentin 600 mg 3 times daily to 300 mg 3 times daily  With history of oxazepam 30 mg 4 times daily will start Ativan 2mg QID (Oxazepam 30mg equvalent to Ativan 3 mg)  Continue Seroquel 25 mg QID PRN, which is a decrease from his home dose of 50 mg 4 times daily as needed  Continue trazodone 100 mg nightly which has been recently decreased from 150 mg nightly    Elevated D-dimer  Patient is status post IVC filter  Follow-up with ultrasound for DVT    Proximal respiratory failure due to COPD  Patient requires oxygen at 3 L a minute at baseline  Continue LABA  Continue prednisone    Inability to care for self  PT OT and social work consulted    Parkinson disease  Continue carbidopa levodopa  Stable 6 mm right upper lobe nodule    Depression ADD/PTSD  Lexapro continue  Holding g BuSpar, trazodone and Seroquel due to concerns for polypharmacy    GERD  Continue PPI  DVT prophylaxis Lovenox and SCDs    I met with the patient's cousin who called 911 as well  as his caretaker/friend today  They both reported a significant decrease in his mentation over the past month after a medication change but could not dictate the medication change  The patient's friend who is very involved in his care reports he feels the patient would be safe to return home if his mental status returns to that what it was a month ago  As patient's mentation has improved have started medications    Malvin Wilcox, DO

## 2024-03-04 ENCOUNTER — TELEPHONE (OUTPATIENT)
Dept: PRIMARY CARE | Facility: CLINIC | Age: 69
End: 2024-03-04
Payer: MEDICARE

## 2024-03-04 ENCOUNTER — APPOINTMENT (OUTPATIENT)
Dept: RADIOLOGY | Facility: HOSPITAL | Age: 69
DRG: 091 | End: 2024-03-04
Payer: MEDICARE

## 2024-03-04 DIAGNOSIS — F41.1 GENERALIZED ANXIETY DISORDER: Primary | ICD-10-CM

## 2024-03-04 PROCEDURE — 94760 N-INVAS EAR/PLS OXIMETRY 1: CPT

## 2024-03-04 PROCEDURE — 93970 EXTREMITY STUDY: CPT

## 2024-03-04 PROCEDURE — 2500000004 HC RX 250 GENERAL PHARMACY W/ HCPCS (ALT 636 FOR OP/ED): Performed by: NURSE PRACTITIONER

## 2024-03-04 PROCEDURE — 97161 PT EVAL LOW COMPLEX 20 MIN: CPT | Mod: GP

## 2024-03-04 PROCEDURE — 93970 EXTREMITY STUDY: CPT | Performed by: RADIOLOGY

## 2024-03-04 PROCEDURE — 2500000001 HC RX 250 WO HCPCS SELF ADMINISTERED DRUGS (ALT 637 FOR MEDICARE OP): Performed by: NURSE PRACTITIONER

## 2024-03-04 PROCEDURE — 97165 OT EVAL LOW COMPLEX 30 MIN: CPT | Mod: GO

## 2024-03-04 PROCEDURE — 99233 SBSQ HOSP IP/OBS HIGH 50: CPT | Performed by: FAMILY MEDICINE

## 2024-03-04 PROCEDURE — 1200000002 HC GENERAL ROOM WITH TELEMETRY DAILY

## 2024-03-04 PROCEDURE — 2500000001 HC RX 250 WO HCPCS SELF ADMINISTERED DRUGS (ALT 637 FOR MEDICARE OP): Performed by: FAMILY MEDICINE

## 2024-03-04 PROCEDURE — S4991 NICOTINE PATCH NONLEGEND: HCPCS | Performed by: FAMILY MEDICINE

## 2024-03-04 PROCEDURE — 2500000002 HC RX 250 W HCPCS SELF ADMINISTERED DRUGS (ALT 637 FOR MEDICARE OP, ALT 636 FOR OP/ED): Performed by: FAMILY MEDICINE

## 2024-03-04 PROCEDURE — 2500000002 HC RX 250 W HCPCS SELF ADMINISTERED DRUGS (ALT 637 FOR MEDICARE OP, ALT 636 FOR OP/ED): Performed by: NURSE PRACTITIONER

## 2024-03-04 RX ORDER — LORAZEPAM 1 MG/1
1 TABLET ORAL 2 TIMES DAILY PRN
Qty: 30 TABLET | Refills: 0 | Status: SHIPPED | OUTPATIENT
Start: 2024-03-04 | End: 2024-03-18 | Stop reason: HOSPADM

## 2024-03-04 RX ORDER — LORAZEPAM 1 MG/1
2 TABLET ORAL EVERY 6 HOURS SCHEDULED
Status: DISCONTINUED | OUTPATIENT
Start: 2024-03-04 | End: 2024-03-08

## 2024-03-04 RX ORDER — PROPRANOLOL HYDROCHLORIDE 80 MG/1
160 CAPSULE, EXTENDED RELEASE ORAL DAILY
Status: DISCONTINUED | OUTPATIENT
Start: 2024-03-04 | End: 2024-03-04

## 2024-03-04 RX ORDER — FLUTICASONE FUROATE AND VILANTEROL 200; 25 UG/1; UG/1
1 POWDER RESPIRATORY (INHALATION)
Status: DISCONTINUED | OUTPATIENT
Start: 2024-03-04 | End: 2024-03-18 | Stop reason: HOSPADM

## 2024-03-04 RX ORDER — LORAZEPAM 1 MG/1
1 TABLET ORAL 2 TIMES DAILY
Status: DISCONTINUED | OUTPATIENT
Start: 2024-03-04 | End: 2024-03-04

## 2024-03-04 RX ORDER — PREDNISONE 20 MG/1
20 TABLET ORAL DAILY
Status: DISCONTINUED | OUTPATIENT
Start: 2024-03-04 | End: 2024-03-08

## 2024-03-04 RX ORDER — HYDROXYZINE HYDROCHLORIDE 25 MG/1
25 TABLET, FILM COATED ORAL 2 TIMES DAILY
COMMUNITY
End: 2024-03-18 | Stop reason: HOSPADM

## 2024-03-04 RX ORDER — PANTOPRAZOLE SODIUM 40 MG/1
40 TABLET, DELAYED RELEASE ORAL
Status: DISCONTINUED | OUTPATIENT
Start: 2024-03-05 | End: 2024-03-08

## 2024-03-04 RX ORDER — CARBIDOPA AND LEVODOPA 25; 100 MG/1; MG/1
1 TABLET ORAL 3 TIMES DAILY
Status: DISCONTINUED | OUTPATIENT
Start: 2024-03-04 | End: 2024-03-18 | Stop reason: HOSPADM

## 2024-03-04 RX ORDER — BUSPIRONE HYDROCHLORIDE 10 MG/1
20 TABLET ORAL 3 TIMES DAILY
COMMUNITY
End: 2024-03-18 | Stop reason: HOSPADM

## 2024-03-04 RX ORDER — LORAZEPAM 1 MG/1
2 TABLET ORAL ONCE
Status: COMPLETED | OUTPATIENT
Start: 2024-03-04 | End: 2024-03-04

## 2024-03-04 RX ORDER — ALBUTEROL SULFATE 0.83 MG/ML
2.5 SOLUTION RESPIRATORY (INHALATION) EVERY 2 HOUR PRN
Status: DISCONTINUED | OUTPATIENT
Start: 2024-03-04 | End: 2024-03-18 | Stop reason: HOSPADM

## 2024-03-04 RX ORDER — TORSEMIDE 20 MG/1
10 TABLET ORAL DAILY
Status: DISCONTINUED | OUTPATIENT
Start: 2024-03-04 | End: 2024-03-18 | Stop reason: HOSPADM

## 2024-03-04 RX ORDER — ALBUTEROL SULFATE 0.83 MG/ML
2.5 SOLUTION RESPIRATORY (INHALATION) EVERY 4 HOURS PRN
Status: DISCONTINUED | OUTPATIENT
Start: 2024-03-04 | End: 2024-03-04

## 2024-03-04 RX ORDER — OXAZEPAM 15 MG/1
15 CAPSULE ORAL 4 TIMES DAILY PRN
Status: ON HOLD | COMMUNITY
End: 2024-03-04

## 2024-03-04 RX ADMIN — LORAZEPAM 2 MG: 1 TABLET ORAL at 16:30

## 2024-03-04 RX ADMIN — BACLOFEN 5 MG: 10 TABLET ORAL at 08:51

## 2024-03-04 RX ADMIN — FOLIC ACID 1 MG: 1 TABLET ORAL at 08:51

## 2024-03-04 RX ADMIN — LORAZEPAM 2 MG: 1 TABLET ORAL at 06:21

## 2024-03-04 RX ADMIN — NICOTINE 1 PATCH: 21 PATCH, EXTENDED RELEASE TRANSDERMAL at 08:52

## 2024-03-04 RX ADMIN — BUPRENORPHINE AND NALOXONE 1 TABLET: 8; 2 TABLET SUBLINGUAL at 08:51

## 2024-03-04 RX ADMIN — TIOTROPIUM BROMIDE INHALATION SPRAY 2 PUFF: 3.12 SPRAY, METERED RESPIRATORY (INHALATION) at 09:10

## 2024-03-04 RX ADMIN — DOCUSATE SODIUM 100 MG: 100 CAPSULE, LIQUID FILLED ORAL at 08:51

## 2024-03-04 RX ADMIN — LORAZEPAM 2 MG: 1 TABLET ORAL at 12:21

## 2024-03-04 RX ADMIN — PREDNISONE 20 MG: 20 TABLET ORAL at 08:51

## 2024-03-04 RX ADMIN — TORSEMIDE 10 MG: 20 TABLET ORAL at 08:51

## 2024-03-04 RX ADMIN — THIAMINE HCL TAB 100 MG 100 MG: 100 TAB at 08:51

## 2024-03-04 RX ADMIN — ASPIRIN 150 MG: 300 SUPPOSITORY RECTAL at 08:50

## 2024-03-04 RX ADMIN — PROPRANOLOL HYDROCHLORIDE 160 MG: 80 CAPSULE, EXTENDED RELEASE ORAL at 14:37

## 2024-03-04 RX ADMIN — PANTOPRAZOLE SODIUM 40 MG: 40 TABLET, DELAYED RELEASE ORAL at 06:21

## 2024-03-04 RX ADMIN — POTASSIUM CHLORIDE 20 MEQ: 1.5 FOR SOLUTION ORAL at 08:50

## 2024-03-04 RX ADMIN — FLUTICASONE FUROATE AND VILANTEROL TRIFENATATE 1 PUFF: 200; 25 POWDER RESPIRATORY (INHALATION) at 09:11

## 2024-03-04 RX ADMIN — LORAZEPAM 2 MG: 1 TABLET ORAL at 00:00

## 2024-03-04 RX ADMIN — BACLOFEN 5 MG: 10 TABLET ORAL at 14:37

## 2024-03-04 RX ADMIN — GABAPENTIN 300 MG: 300 CAPSULE ORAL at 14:37

## 2024-03-04 RX ADMIN — CARBIDOPA AND LEVODOPA 1 TABLET: 25; 100 TABLET ORAL at 12:21

## 2024-03-04 RX ADMIN — ESCITALOPRAM OXALATE 20 MG: 20 TABLET ORAL at 08:51

## 2024-03-04 RX ADMIN — ENOXAPARIN SODIUM 40 MG: 40 INJECTION SUBCUTANEOUS at 08:51

## 2024-03-04 RX ADMIN — GABAPENTIN 300 MG: 300 CAPSULE ORAL at 08:51

## 2024-03-04 RX ADMIN — CARBIDOPA AND LEVODOPA 1 TABLET: 25; 100 TABLET ORAL at 06:21

## 2024-03-04 ASSESSMENT — LIFESTYLE VARIABLES
AUDITORY DISTURBANCES: NOT PRESENT
TREMOR: NO TREMOR
HEADACHE, FULLNESS IN HEAD: NOT PRESENT
PULSE: 71
ORIENTATION AND CLOUDING OF SENSORIUM: DISORIENTED FOR PLACE OR PERSON
TREMOR: MODERATE, WITH PATIENT'S ARMS EXTENDED
TOTAL SCORE: 4
ANXIETY: NO ANXIETY, AT EASE
ANXIETY: 2
AUDITORY DISTURBANCES: NOT PRESENT
TOTAL SCORE: 6
VISUAL DISTURBANCES: NOT PRESENT
AUDITORY DISTURBANCES: NOT PRESENT
TREMOR: NO TREMOR
VISUAL DISTURBANCES: NOT PRESENT
NAUSEA AND VOMITING: NO NAUSEA AND NO VOMITING
ANXIETY: NO ANXIETY, AT EASE
TOTAL SCORE: 8
BLOOD PRESSURE: 135/75
PAROXYSMAL SWEATS: NO SWEAT VISIBLE
AGITATION: NORMAL ACTIVITY
PAROXYSMAL SWEATS: NO SWEAT VISIBLE
HEADACHE, FULLNESS IN HEAD: NOT PRESENT
PAROXYSMAL SWEATS: NO SWEAT VISIBLE
HEADACHE, FULLNESS IN HEAD: NOT PRESENT
VISUAL DISTURBANCES: NOT PRESENT
ORIENTATION AND CLOUDING OF SENSORIUM: DISORIENTED FOR PLACE OR PERSON
AGITATION: NORMAL ACTIVITY
NAUSEA AND VOMITING: NO NAUSEA AND NO VOMITING
NAUSEA AND VOMITING: NO NAUSEA AND NO VOMITING
ORIENTATION AND CLOUDING OF SENSORIUM: DISORIENTED FOR PLACE OR PERSON
AGITATION: NORMAL ACTIVITY

## 2024-03-04 ASSESSMENT — COGNITIVE AND FUNCTIONAL STATUS - GENERAL
WALKING IN HOSPITAL ROOM: TOTAL
MOVING FROM LYING ON BACK TO SITTING ON SIDE OF FLAT BED WITH BEDRAILS: A LITTLE
MOBILITY SCORE: 14
TURNING FROM BACK TO SIDE WHILE IN FLAT BAD: A LITTLE
TOILETING: A LOT
PERSONAL GROOMING: A LITTLE
DAILY ACTIVITIY SCORE: 16
DRESSING REGULAR LOWER BODY CLOTHING: A LOT
STANDING UP FROM CHAIR USING ARMS: A LITTLE
MOVING TO AND FROM BED TO CHAIR: A LITTLE
DRESSING REGULAR UPPER BODY CLOTHING: A LITTLE
HELP NEEDED FOR BATHING: A LOT
CLIMB 3 TO 5 STEPS WITH RAILING: TOTAL

## 2024-03-04 ASSESSMENT — PAIN SCALES - GENERAL
PAINLEVEL_OUTOF10: 0 - NO PAIN

## 2024-03-04 ASSESSMENT — ACTIVITIES OF DAILY LIVING (ADL)
BATHING_ASSISTANCE: MODERATE
ADL_ASSISTANCE: NEEDS ASSISTANCE

## 2024-03-04 ASSESSMENT — PAIN - FUNCTIONAL ASSESSMENT
PAIN_FUNCTIONAL_ASSESSMENT: 0-10
PAIN_FUNCTIONAL_ASSESSMENT: 0-10

## 2024-03-04 NOTE — PROGRESS NOTES
03/04/24 1507   Discharge Planning   Living Arrangements Alone   Support Systems Zoroastrian/beatrice community;Friends/neighbors;Children   Assistance Needed Spoke to Rib's (friend) wife, Yonis, who has been in contact with pt's dtr.  I expalined we need a decision maker for pt. She will give dtr my contact information.   Type of Residence Private residence   Number of Stairs to Enter Residence 2   Number of Stairs Within Residence 1   Do you have animals or pets at home? Yes   Type of Animals or Pets 1 cat   Who is requesting discharge planning? Provider   Home or Post Acute Services Post acute facilities (Rehab/SNF/etc)   Type of Post Acute Facility Services   (Rehab vs LTC)   Patient expects to be discharged to: [facility] scores: PT 14 OT 16, SNF recommended.  Once dtr contacts SW, will give choice of facilities.   Does the patient need discharge transport arranged? Yes   Has discharge transport been arranged? No     SW spoke with pt's dtr, Orin Sims [phone]. Explained chain of decision makers and she and her brother would be decision makers for pt. Explained pt is confused at tjhis point, has a sitter. She would like physician to call her to explain pt's medical status. Messaged Dr. Morrison. Also explained insurance will cover SNF if pt needs rehab and is able to participate in it. Told her pt may need Medicaid if he needs to stay in a facility long term.

## 2024-03-04 NOTE — SIGNIFICANT EVENT
Ultrasound for DVT was positive for left lower extremity DVT  Given patient's agitation we will give Eliquis instead of heparin

## 2024-03-04 NOTE — PROGRESS NOTES
Spoke with psychiatrist over at [facility] regarding patient admission for confusion and falls possibly related to over medication.  He will be managing his Suboxone.  Was concerned about dose of benzodiazepines and the depressant interaction between all the other medications he was taking.  Discussed best practice therapy.  Will decrease benzodiazepine dose going to Ativan 1 mg twice a daily.  Patient has been on this for a long time in the past along with Suboxone.  Currently on palliative care but not hospice.  Palliative care does not manage controlled substances until patient is hospice.

## 2024-03-04 NOTE — PROGRESS NOTES
Spoke with provider in charge of [facility] inpatient care.  Discussion about benzodiazepines and Suboxone patient takes at home.  It appears that he overmedicates and does not have the supervision at home thought to have had.  It was stated that he has many pill bottles at home.  Seems he has been taking as needed medications around-the-clock perhaps playing a part in this admission.  Hospital provider finding placement for patient as he appears right now it is not conducive for him to go home by himself.  He remains on palliative care.  Concerns weaning patient off of benzodiazepine is taking at home.  That is being taken care of while inpatient currently.  In the works to get patient power of  for healthcare if patient is not able to make reasonable decisions on his own.  Will keep up-to-date with patient through medical record

## 2024-03-04 NOTE — CARE PLAN
The patient's goals for the shift include  Pt. Will remain safe    The clinical goals for the shift include patient will not have any agitation tonight

## 2024-03-04 NOTE — PROGRESS NOTES
Occupational Therapy    Evaluation    Patient Name: Chris Wu  MRN: [id number]  Today's Date: 3/4/2024  Time Calculation  Start Time: 1100  Stop Time: 1119  Time Calculation (min): 19 min        Assessment:  OT Assessment: Pt presents with impaired cognition, impaired balance and functional stength deficit during performacne of ADL and mobility. Would benefit from continued OT services at moderate intensity to return to PLOF.  End of Session Communication: Bedside nurse, PCT/NA/KEVON  End of Session Patient Position: Bed, 3 rail up, Alarm on (sitter present)  OT Assessment Results: Decreased ADL status, Decreased safe judgment during ADL, Decreased cognition, Decreased endurance, Decreased functional mobility  Evaluation/Treatment Tolerance: Patient tolerated treatment well  Medical Staff Made Aware: Yes  Barriers to Participation: Ability to acquire knowledge, Housing layout, Insight into problems, Premorbid level of function  Plan:  Treatment Interventions: ADL retraining, Functional transfer training, UE strengthening/ROM, Endurance training, Compensatory technique education  OT Frequency: 3 times per week  OT Discharge Recommendations: Moderate intensity level of continued care  OT - OK to Discharge: Yes  Treatment Interventions: ADL retraining, Functional transfer training, UE strengthening/ROM, Endurance training, Compensatory technique education    Subjective     General:  General  Reason for Referral: 67 yo male admitted for AMS  Past Medical History Relevant to Rehab: PMH: COPD, Parkinson's disorder, history of factor V Leiden mutation with DVT and PE in the past & appears to not be on anticoagulation currently and is s/p IVC filter, depression/anxiety & PTSD & diastolic CHF  Family/Caregiver Present: No  Co-Treatment: PT  Co-Treatment Reason: to maximize pt outcomes  Prior to Session Communication: Bedside nurse, PCT/RACHEL/KEVON  Patient Position Received: Bed, 4 rail up, Alarm off, caregiver  "present  General Comment: Pt pleasant and agreeable, [place]. Follows all commands appropriately. Pt is a poor historian and provides inconsistent responses to orientation and PLOF questions. Per EMR, baseline is a&O x3 with \"forgetfulness\"  Precautions:  Hearing/Visual Limitations: [place] with [employer] hearing aides  Medical Precautions: Fall precautions  Precautions Comment: tele, 2L O2, SOB and fatigues with minimal activity    Pain:  Pain Assessment  Pain Assessment: 0-10  Pain Score: 0 - No pain    Objective   Cognition:  Overall Cognitive Status: Impaired  Orientation Level: Disoriented to situation, Disoriented to place (States accurate month/ year and full name)  Safety/Judgement:  (impaired)  Processing Speed: Delayed           Home Living:  Type of Home: House  Lives With: Alone  Home Adaptive Equipment: Wheelchair-manual, Walker rolling or standard, Cane  Home Layout: One level, Full bath main level  Home Access: Stairs to enter with rails  Entrance Stairs-Number of Steps: 2  Home Living Comments: Per EMR - pt. lives alone, has 2 ELIZABETH and 1 step down to his bathroom;  Per pt - he has neighbor comes over daily, his bed/bath are on same level, but needs to do steps to access bathroom.  Prior Function:  Prior Function Comments: Pt initally states his friend assists with all bathing, dressing and transfers. Later states he is independent and \"could drive if I wanted to\" and manages his own medications.    ADL:  Eating Assistance: Independent  Grooming Assistance: Minimal  Bathing Assistance: Moderate  UE Dressing Assistance: Minimal  LE Dressing Assistance: Moderate  LE Dressing Deficit:  (dons socks while sitting EOB. Due to observed balance and functional weakness, anticipate assistance needed for standing tasks)  Toileting Assistance with Device: Moderate  Activity Tolerance:  Endurance: Tolerates less than 10 min exercise, no significant change in vital signs  Bed Mobility/Transfers: Bed Mobility  Bed Mobility: " Yes  Bed Mobility 1  Bed Mobility 1: Supine to sitting, Sitting to supine  Level of Assistance 1: Contact guard  Bed Mobility Comments 1: HOB elevated and bed rails used by pt    Transfers  Transfer: Yes  Transfer 1  Technique 1: Sit to stand, Stand to sit  Transfer Device 1: Walker  Transfer Level of Assistance 1: Minimum assistance  Trials/Comments 1: 1st attempt with episode of posterior LOB, assistance to safely return sitting. Achieves full standing with 2nd attempt      Ambulation/Gait Training:  Ambulation/Gait Training  Ambulation/Gait Training Performed: Yes  Ambulation/Gait Training 1  Surface 1: Level tile  Device 1: Rolling walker  Assistance 1: Minimum assistance (+1 additional person managing medical equipment)  Comments/Distance (ft) 1: Narrow SILVIO and shiffling gait. x2 episodes of freezing requiring tactile/verbal cues to resume gait. Physical assistance required for hnad positioning also walker placement in proximity to pt during foward walking and direction changes.  Standing Balance:  Dynamic Standing Balance  Dynamic Standing-Balance Support: Bilateral upper extremity supported  Dynamic Standing-Comments: retropulsive and forward flexed posture   Strength:  Strength Comments: B UE strength deficit observed with activity    Extremities:   RUE   RUE : Within Functional Limits    LUE   LUE: Within Functional Limits      Outcome Measures:[facility] Daily Activity  Putting on and taking off regular lower body clothing: A lot  Bathing (including washing, rinsing, drying): A lot  Putting on and taking off regular upper body clothing: A little  Toileting, which includes using toilet, bedpan or urinal: A lot  Taking care of personal grooming such as brushing teeth: A little  Eating Meals: None  Daily Activity - Total Score: 16        Education Documentation  Body Mechanics, taught by Andie Hernandez OT at 3/4/2024 12:36 PM.  Learner: Patient  Readiness: Acceptance  Method: Explanation,  Demonstration  Response: Needs Reinforcement  Comment: hand placement and positioning of device      Goals:  Encounter Problems            OT Goals       Pt will demo functional transfers to/ from EOB, chair and commode sup and LRD (Progressing)       Start:  03/04/24    Expected End:  03/18/24            Pt will demo functional mobility necessary to complete ADL routine sup and LRD (Progressing)       Start:  03/04/24    Expected End:  03/18/24            Pt will demo ADL routine and meaningful daily activities with sup using modifications as needed  (Progressing)       Start:  03/04/24    Expected End:  03/18/24            Pt will demo improved static/ dynamic standing balance, evidenced by completion of BADL or functional activity with < SB assist for duration of activity.   (Progressing)       Start:  03/04/24    Expected End:  03/18/24            Pt will demo improved activity tolerance evidenced by participation in BADL and/or functional mobility x10 mins with </=1 rest break.   (Progressing)       Start:  03/04/24    Expected End:  03/18/24

## 2024-03-04 NOTE — PROGRESS NOTES
Physical Therapy    Physical Therapy Evaluation    Patient Name: Chris Wu  MRN: [id number]  Today's Date: 3/4/2024   Time Calculation  Start Time: 1100  Stop Time: 1119  Time Calculation (min): 19 min    Assessment/Plan   PT Assessment  PT Assessment Results: Decreased strength, Decreased range of motion, Decreased endurance, Impaired balance, Decreased mobility, Decreased cognition, Decreased safety awareness, Impaired judgement, Impaired hearing  Rehab Prognosis: Good  Barriers to Discharge: cognition  Evaluation/Treatment Tolerance: Patient limited by fatigue  Medical Staff Made Aware: Yes  Strengths: Support of extended family/friends  Barriers to Participation: Ability to acquire knowledge, Insight into problems, Housing layout, Premorbid level of function  End of Session Communication: Bedside nurse, Physician  Assessment Comment: Pt. demo decreased strength, balance, endurance, and safety awareness which contribute to need for assist to safely complete basic mobility tasks and increase his risk for falls.  End of Session Patient Position: Bed, 3 rail up, Alarm on (sitter present)  IP OR SWING BED PT PLAN  Inpatient or Swing Bed: Inpatient  PT Plan  Treatment/Interventions: Bed mobility, Transfer training, Gait training, Stair training, Balance training, Strengthening, Endurance training, Therapeutic exercise  PT Plan: Skilled PT  PT Frequency: 3 times per week  PT Discharge Recommendations: Moderate intensity level of continued care  Equipment Recommended upon Discharge: Wheeled walker  PT Recommended Transfer Status: Assist x2  PT - OK to Discharge: Yes      Subjective   General Visit Information:  General  Reason for Referral: 67 yo male admit with AMS  Past Medical History Relevant to Rehab: PMH: COPD, Parkinson's disorder, history of factor V Leiden mutation with DVT and PE in the past & appears to not be on anticoagulation currently and is s/p IVC filter, depression/anxiety & PTSD & diastolic  CHF  Family/Caregiver Present: No  Co-Treatment: OT  Co-Treatment Reason: to maximize safety and mobility  Prior to Session Communication: Bedside nurse  Patient Position Received: Bed, 4 rail up, Alarm off, caregiver present  General Comment: Pt. cleared for PT/OT eval.  Tele, 2L O2 NC in place.  Pt. is poor historian with inconsistent answers.  Per EMR pt is A+Ox3 but forgetful at baseline.  Pleasant, cooperative at time of eval  Home Living:  Home Living  Type of Home: House  Lives With: Alone  Home Adaptive Equipment: Wheelchair-manual, Walker rolling or standard, Cane  Home Layout: One level, Full bath main level  Home Access: Stairs to enter with rails  Entrance Stairs-Number of Steps: 2 (per EMR)  Home Living Comments: Per EMR - pt. lives alone, has 2 ELIZABETH and 1 step down to his bathroom;  Per pt - he has neighbor comes over daily, his bed/bath are on same level, but needs to do steps to access bathroom.  Prior Level of Function:  Prior Function Per Pt/Caregiver Report  Level of [place]: Needs assistance with ADLs, Needs assistance with homemaking, Needs assistance with functional transfers (inconsistent report by pt. who states his friend assists him with dressing, bathing, transfers, but yet he can do it on his own also.)  Receives Help From: Friends  ADL Assistance: Needs assistance  Homemaking Assistance: Needs assistance  Ambulatory Assistance: Needs assistance  Prior Function Comments: Per. EMR - pt. ambulates with cane.  Per pt. report - he propels self in wheelchair, able to transfer himself to wheelchair, also uses a wheeled walker.  Precautions:  Precautions  Hearing/Visual Limitations: [place], bilateral hearing aides  Medical Precautions: Fall precautions, Oxygen therapy device and L/min  Vital Signs:       Objective   Pain:  Pain Assessment  Pain Assessment: 0-10  Pain Score: 0 - No pain  Cognition:  Cognition  Overall Cognitive Status: Impaired  Orientation Level: Disoriented to situation,  Disoriented to place (aware he was in the hospital but inconsistently)  Memory:  (impaired)  Safety/Judgement:  (impaired)  Processing Speed: Delayed    General Assessments:  General Observation  General Observation: Pt. derian confusion and impaired memory, was cooperative with OOB mobility, required assist x2 (one for physical assist, one to manage equipment) for safety d/t weakness, decreased endurance, and decreased cognition     Activity Tolerance  Endurance: Tolerates less than 10 min exercise, no significant change in vital signs  Early Mobility/Exercise Safety Screen: Proceed with mobilization - No exclusion criteria met  Activity Tolerance Comments: fatigues quickly with mobility    Strength  Strength Comments: generalized weakness in BUE/BLE - grossly >/=3/5 throughout as observed during mobility  Static Sitting Balance  Static Sitting-Balance Support: No upper extremity supported, Feet supported  Static Sitting-Level of Assistance: Distant supervision  Dynamic Sitting Balance  Dynamic Sitting-Balance Support: No upper extremity supported, Feet supported  Dynamic Sitting-Balance: Reaching for objects (donning socks)    Static Standing Balance  Static Standing-Balance Support: Bilateral upper extremity supported  Static Standing-Level of Assistance: Minimum assistance  Static Standing-Comment/Number of Minutes: LOB posterior  Functional Assessments:  Bed Mobility  Bed Mobility: Yes  Bed Mobility 1  Bed Mobility 1: Supine to sitting, Sitting to supine  Level of Assistance 1: Contact guard  Bed Mobility Comments 1: to/from raised HOB, rails    Transfers  Transfer: Yes  Transfer 1  Transfer From 1: Bed to  Transfer to 1: Stand  Technique 1: Sit to stand, Stand to sit  Transfer Device 1: Walker  Transfer Level of Assistance 1: Minimum assistance, +1 to manage equipment  Trials/Comments 1: multiple attempts with first attempt LOB posterior with guided return to sit on bed; cues for hand placement, safe technique.   Pt. needs verbal reminders    Ambulation/Gait Training  Ambulation/Gait Training Performed: Yes  Ambulation/Gait Training 1  Surface 1: Level tile  Device 1: Rolling walker  Assistance 1: Minimum assistance (+ 1 to manage O2 tank and tele cord)  Quality of Gait 1: Forward flexed posture, Inconsistent stride length, Shuffling gait (walker held too far in front of pt - cues for safe use and positioning of FWW)  Comments/Distance (ft) 1: 20 ft to window, standing rest break, 20 ft back to bed; pt. demo progressive fatigue as ambulation continued with instability in BLE and increasingly flex posture.  Extremity/Trunk Assessments:  RUE   RUE : Within Functional Limits (ROM)  LUE   LUE: Within Functional Limits (ROM)  RLE   RLE :  (tightness in hamstrings and hip flexors)  LLE   LLE :  (tightness in hamstrings and hip flexors)  Outcome Measures:  [facility] Basic Mobility  Turning from your back to your side while in a flat bed without using bedrails: A little  Moving from lying on your back to sitting on the side of a flat bed without using bedrails: A little  Moving to and from bed to chair (including a wheelchair): A little  Standing up from a chair using your arms (e.g. wheelchair or bedside chair): A little  To walk in hospital room: Total  Climbing 3-5 steps with railing: Total  Basic Mobility - Total Score: 14    Encounter Problems       Encounter Problems (Active)       Balance       STG - Maintains static standing balance with unilateral upper extremity support x 5+ min in order to complete a functional task with SBA       Start:  03/04/24    Expected End:  03/18/24       INTERVENTIONS:  1. Practice standing with minimal support.  2. Educate patient about standing tolerance.  3. Educate patient about independence with gait, transfers, and ADL's.  4. Educate patient about use of assistive device.  5. Educate patient about self-directed care.            Mobility       STG - Patient will ambulate 50 ft x 3 with  appropriate device and supervision       Start:  03/04/24    Expected End:  03/18/24               Transfers       STG - Transfer from bed to chair with appropriate device IND       Start:  03/04/24    Expected End:  03/18/24            STG - Patient to transfer to and from sit to supine IND       Start:  03/04/24    Expected End:  03/18/24            STG - Patient will transfer sit to and from stand with appropriate device IND       Start:  03/04/24    Expected End:  03/18/24                   Education Documentation  Precautions, taught by Valeria Lundberg, PT at 3/4/2024 12:00 PM.  Learner: Patient  Readiness: Acceptance  Method: Explanation, Demonstration  Response: Verbalizes Understanding, Needs Reinforcement    Mobility Training, taught by Valeria Lundberg, PT at 3/4/2024 12:00 PM.  Learner: Patient  Readiness: Acceptance  Method: Explanation, Demonstration  Response: Verbalizes Understanding, Needs Reinforcement    Education Comments  No comments found.

## 2024-03-04 NOTE — CARE PLAN
Problem: Respiratory  Goal: No signs of respiratory distress (eg. Use of accessory muscles. Peds grunting)  Outcome: Progressing      The clinical goals for the shift include patient will not have any agitation tonight    Over the shift, the patient did not make progress toward the following goals. Barriers to progression include patient is agitated and confused . Recommendations to address these barriers include family involvement and reiteration of care .

## 2024-03-04 NOTE — PROGRESS NOTES
Chris Wu is a 68 y.o. male on day 3 of admission presenting with Encephalopathy.      Subjective   Patient was found comfortably this morning.       Objective     Last Recorded Vitals  /71 (BP Location: Right arm, Patient Position: Lying)   Pulse 86   Temp 36.4 °C (97.5 °F) (Temporal)   Resp 18   Wt 80 kg (176 lb 5.9 oz)   SpO2 94%   Intake/Output last 3 Shifts:    Intake/Output Summary (Last 24 hours) at 3/4/2024 1336  Last data filed at 3/4/2024 1215  Gross per 24 hour   Intake 360 ml   Output 1000 ml   Net -640 ml         Admission Weight  Weight: 72.6 kg (160 lb) (03/01/24 1533)    Daily Weight  03/01/24 : 80 kg (176 lb 5.9 oz)    Image Results  Lower extremity venous duplex bilateral  Narrative: Interpreted By:  Blu Xiong,   STUDY:  [facility] US LOWER EXTREMITY VENOUS DUPLEX BILATERAL  3/2/2024 7:48 pm      INDICATION:  69 y/o   M with  Signs/Symptoms:r/o DVT. LMP:  Unknown.      COMPARISON:  None.      ACCESSION NUMBER(S):  [id number]      ORDERING CLINICIAN:  SUGEY HURTADO      TECHNIQUE:  Routine ultrasound of the  right lower extremity was performed with  duplex Doppler (color and spectral) evaluation.   Static images were  obtained for remote interpretation.      FINDINGS:  Examination significantly limited due to patient condition.      Patency demonstrated from the left femoral vein proximally down to  below the knee.          Impression: Limited examination due to patient condition. No DVT detected      MACRO:  None      Signed by: Blu Xiong 3/2/2024 8:18 PM  Dictation workstation:   [id number]    Physical exam  Gen: lying comfortably in bed, not in acute distress  HEENT: atraumatic, normocephalic  Pulm: normal respiratory effort, clear to auscultation b/l  Cardiac: RRR, no murmurs noted, normal S1/S2  GI: Soft, nontender, BS+  MSK: normal ROM without joint swelling  Extremities: no LE edema, cyanosis  Neuro: AOX3, CN II-XII grossly intact, equal b/l strength, no loss in  sensation, hard of hearing  Psych: calm and appropriate for situation       Assessment/Plan      Chris Wu is a 68 y.o. male with a pertinent hx of COPD, Parkinson's disorder, history of factor V Leiden mutation with DVT and PE in the past & appears to not be on anticoagulation currently and is s/p IVC filter, depression/anxiety & PTSD & diastolic CHF who presented to [facility] due to altered mental status,     Altered mental status   Due to  polypharmacy.  CTA of head and neck reveals no evidence of hemodynamically significant stenosis of the cervical vessels with mild arteriosclerotic narrowing of bilateral carotid bulbs and no evidence of significant stenosis of the large branch vessel cutoffs of the intracranial vessels  CT of the head showed no acute findings  CT of C-spine showed no acute findings  CT of chest/abdomen/pelvis w/ 6 mm right upper lobe nodule  CT of thoracic and lumbar spine showed no evidence of injury  Pelvic x-ray showed pelvic ring intact and no acute fractures  MRI was canceled as patient initially could not answer questions however patient has significantly improved with medication estimates    Polypharmacy  In review of chart patient is on multiple sedative medications including Seroquel, oxazepam, Vistaril, gabapentin, Suboxone, baclofen, trazodone, Lexapro and BuSpar  The patient's cousin and friend who also cares for the patient reported patient's mentation has significantly worsened over the past month when his medications were adjusted however cannot specify which medication was new  Decreased home medication of baclofen from 10 mg 3 times daily to 5 mg 3 times daily  Have restarted home medication of Suboxone 8/2 1 tab sublingual BID  Blood pressure has been controlled, will continue to hold clonidine 0.1 mg twice daily  Continue escitalopram 20mg every day   Decreased gabapentin 600 mg 3 times daily to 300 mg 3 times daily  With history of oxazepam 30 mg 4 times daily will start  Ativan 2mg QID (Oxazepam 30mg equvalent to Ativan 3 mg)  Continue Seroquel 25 mg QID PRN, which is a decrease from his home dose of 50 mg 4 times daily as needed  Continue trazodone 100 mg nightly which has been recently decreased from 150 mg nightly    PCP reported psychiatrist will be continuing Suboxone further recommended a decrease of Ativan 1 mg twice daily  Oxazepam 30 mg 4 times daily prior to admission which is a likely the cause of this admission   I suspect if Ativan is weaned to 1 mg twice daily patient will likely withdrawal  Will continue current decreased dose of Ativan and defer further titration to psychiatry as an outpatient    6 mm right upper lobe nodule  Follow-up for repeat imaging in 3 to 6 months    Elevated D-dimer  Patient is status post IVC filter  Initial DVT ultrasound was limited due to patient's mentation as this has improved this has been reordered  Ultrasound for DVT pending    Proximal respiratory failure due to COPD  Patient requires oxygen at 3 L a minute at baseline  Continue LABA  Continue prednisone    Inability to care for self  PT OT and social work consulted    Parkinson disease  Continue carbidopa levodopa  Stable 6 mm right upper lobe nodule    Depression ADD/PTSD  Lexapro continue  Holding g BuSpar, trazodone and Seroquel due to concerns for polypharmacy    GERD  Continue PPI  DVT prophylaxis Lovenox and SCDs    I met with the patient's cousin who called 911 as well as his caretaker/friend today  They both reported a significant decrease in his mentation over the past month after a medication change but could not dictate the medication change  The patient's friend who is very involved in his care reports he feels the patient would be safe to return home if his mental status returns to that what it was a month ago  The patient's mentation has improved with the medication adjustments mentioned above  PT and OT have evaluated and recommend placement in a skilled nursing  facility  Patient is agreeable to placement in a skilled nursing facility    Disposition  Patient stable for discharge to skilled nursing facility pending ultrasound for DVT    Malvin Wilcox, DO

## 2024-03-05 LAB
ALBUMIN SERPL BCP-MCNC: 3.5 G/DL (ref 3.4–5)
ANION GAP SERPL CALC-SCNC: 11 MMOL/L (ref 10–20)
BUN SERPL-MCNC: 10 MG/DL (ref 6–23)
CALCIUM SERPL-MCNC: 8.3 MG/DL (ref 8.6–10.3)
CHLORIDE SERPL-SCNC: 95 MMOL/L (ref 98–107)
CO2 SERPL-SCNC: 33 MMOL/L (ref 21–32)
CREAT SERPL-MCNC: 0.67 MG/DL (ref 0.5–1.3)
EGFRCR SERPLBLD CKD-EPI 2021: >90 ML/MIN/1.73M*2
ERYTHROCYTE [DISTWIDTH] IN BLOOD BY AUTOMATED COUNT: 12.6 % (ref 11.5–14.5)
GLUCOSE BLD MANUAL STRIP-MCNC: 108 MG/DL (ref 74–99)
GLUCOSE SERPL-MCNC: 86 MG/DL (ref 74–99)
HCT VFR BLD AUTO: 39.7 % (ref 41–52)
HGB BLD-MCNC: 12.2 G/DL (ref 13.5–17.5)
MAGNESIUM SERPL-MCNC: 1.92 MG/DL (ref 1.6–2.4)
MCH RBC QN AUTO: 29 PG (ref 26–34)
MCHC RBC AUTO-ENTMCNC: 30.7 G/DL (ref 32–36)
MCV RBC AUTO: 94 FL (ref 80–100)
NRBC BLD-RTO: 0 /100 WBCS (ref 0–0)
PHOSPHATE SERPL-MCNC: 3.1 MG/DL (ref 2.5–4.9)
PLATELET # BLD AUTO: 145 X10*3/UL (ref 150–450)
POTASSIUM SERPL-SCNC: 4 MMOL/L (ref 3.5–5.3)
RBC # BLD AUTO: 4.21 X10*6/UL (ref 4.5–5.9)
SODIUM SERPL-SCNC: 135 MMOL/L (ref 136–145)
WBC # BLD AUTO: 6.9 X10*3/UL (ref 4.4–11.3)

## 2024-03-05 PROCEDURE — 2500000004 HC RX 250 GENERAL PHARMACY W/ HCPCS (ALT 636 FOR OP/ED): Performed by: FAMILY MEDICINE

## 2024-03-05 PROCEDURE — 99232 SBSQ HOSP IP/OBS MODERATE 35: CPT | Performed by: INTERNAL MEDICINE

## 2024-03-05 PROCEDURE — 80069 RENAL FUNCTION PANEL: CPT | Performed by: INTERNAL MEDICINE

## 2024-03-05 PROCEDURE — 1200000002 HC GENERAL ROOM WITH TELEMETRY DAILY

## 2024-03-05 PROCEDURE — 83735 ASSAY OF MAGNESIUM: CPT | Performed by: INTERNAL MEDICINE

## 2024-03-05 PROCEDURE — S4991 NICOTINE PATCH NONLEGEND: HCPCS | Performed by: FAMILY MEDICINE

## 2024-03-05 PROCEDURE — 2500000002 HC RX 250 W HCPCS SELF ADMINISTERED DRUGS (ALT 637 FOR MEDICARE OP, ALT 636 FOR OP/ED): Performed by: FAMILY MEDICINE

## 2024-03-05 PROCEDURE — 82947 ASSAY GLUCOSE BLOOD QUANT: CPT

## 2024-03-05 PROCEDURE — 2500000001 HC RX 250 WO HCPCS SELF ADMINISTERED DRUGS (ALT 637 FOR MEDICARE OP): Performed by: FAMILY MEDICINE

## 2024-03-05 PROCEDURE — 36415 COLL VENOUS BLD VENIPUNCTURE: CPT | Performed by: INTERNAL MEDICINE

## 2024-03-05 PROCEDURE — 2500000001 HC RX 250 WO HCPCS SELF ADMINISTERED DRUGS (ALT 637 FOR MEDICARE OP): Performed by: NURSE PRACTITIONER

## 2024-03-05 PROCEDURE — 85027 COMPLETE CBC AUTOMATED: CPT | Performed by: INTERNAL MEDICINE

## 2024-03-05 RX ADMIN — BUPRENORPHINE AND NALOXONE 1 TABLET: 8; 2 TABLET SUBLINGUAL at 09:05

## 2024-03-05 RX ADMIN — THIAMINE HCL TAB 100 MG 100 MG: 100 TAB at 08:57

## 2024-03-05 RX ADMIN — QUETIAPINE FUMARATE 25 MG: 25 TABLET ORAL at 09:05

## 2024-03-05 RX ADMIN — POTASSIUM CHLORIDE 20 MEQ: 1.5 FOR SOLUTION ORAL at 08:55

## 2024-03-05 RX ADMIN — GABAPENTIN 300 MG: 300 CAPSULE ORAL at 16:00

## 2024-03-05 RX ADMIN — LORAZEPAM 2 MG: 1 TABLET ORAL at 05:36

## 2024-03-05 RX ADMIN — DOCUSATE SODIUM 100 MG: 100 CAPSULE, LIQUID FILLED ORAL at 08:55

## 2024-03-05 RX ADMIN — LORAZEPAM 2 MG: 1 TABLET ORAL at 12:44

## 2024-03-05 RX ADMIN — TORSEMIDE 10 MG: 20 TABLET ORAL at 08:57

## 2024-03-05 RX ADMIN — ESCITALOPRAM OXALATE 20 MG: 20 TABLET ORAL at 08:55

## 2024-03-05 RX ADMIN — FOLIC ACID 1 MG: 1 TABLET ORAL at 08:57

## 2024-03-05 RX ADMIN — PANTOPRAZOLE SODIUM 40 MG: 40 TABLET, DELAYED RELEASE ORAL at 08:57

## 2024-03-05 RX ADMIN — GABAPENTIN 300 MG: 300 CAPSULE ORAL at 08:57

## 2024-03-05 RX ADMIN — PREDNISONE 20 MG: 20 TABLET ORAL at 08:57

## 2024-03-05 RX ADMIN — NICOTINE 1 PATCH: 21 PATCH, EXTENDED RELEASE TRANSDERMAL at 08:55

## 2024-03-05 RX ADMIN — TIOTROPIUM BROMIDE INHALATION SPRAY 2 PUFF: 3.12 SPRAY, METERED RESPIRATORY (INHALATION) at 07:00

## 2024-03-05 RX ADMIN — CARBIDOPA AND LEVODOPA 1 TABLET: 25; 100 TABLET ORAL at 08:57

## 2024-03-05 RX ADMIN — CARBIDOPA AND LEVODOPA 1 TABLET: 25; 100 TABLET ORAL at 16:00

## 2024-03-05 RX ADMIN — APIXABAN 10 MG: 5 TABLET, FILM COATED ORAL at 08:55

## 2024-03-05 RX ADMIN — FLUTICASONE FUROATE AND VILANTEROL TRIFENATATE 1 PUFF: 200; 25 POWDER RESPIRATORY (INHALATION) at 07:00

## 2024-03-05 ASSESSMENT — PAIN - FUNCTIONAL ASSESSMENT
PAIN_FUNCTIONAL_ASSESSMENT: 0-10

## 2024-03-05 ASSESSMENT — COGNITIVE AND FUNCTIONAL STATUS - GENERAL
DAILY ACTIVITIY SCORE: 18
MOVING TO AND FROM BED TO CHAIR: A LITTLE
TURNING FROM BACK TO SIDE WHILE IN FLAT BAD: A LITTLE
STANDING UP FROM CHAIR USING ARMS: A LITTLE
STANDING UP FROM CHAIR USING ARMS: A LOT
PERSONAL GROOMING: A LITTLE
HELP NEEDED FOR BATHING: A LOT
CLIMB 3 TO 5 STEPS WITH RAILING: TOTAL
DRESSING REGULAR UPPER BODY CLOTHING: A LITTLE
WALKING IN HOSPITAL ROOM: A LOT
PERSONAL GROOMING: A LITTLE
DAILY ACTIVITIY SCORE: 15
TURNING FROM BACK TO SIDE WHILE IN FLAT BAD: A LITTLE
TOILETING: A LOT
MOBILITY SCORE: 13
CLIMB 3 TO 5 STEPS WITH RAILING: A LITTLE
EATING MEALS: A LITTLE
DRESSING REGULAR UPPER BODY CLOTHING: A LITTLE
WALKING IN HOSPITAL ROOM: A LITTLE
MOBILITY SCORE: 18
MOVING TO AND FROM BED TO CHAIR: A LOT
TOILETING: A LITTLE
MOVING FROM LYING ON BACK TO SITTING ON SIDE OF FLAT BED WITH BEDRAILS: A LITTLE
DRESSING REGULAR LOWER BODY CLOTHING: A LITTLE
EATING MEALS: A LITTLE
DRESSING REGULAR LOWER BODY CLOTHING: A LOT
MOVING FROM LYING ON BACK TO SITTING ON SIDE OF FLAT BED WITH BEDRAILS: A LITTLE
HELP NEEDED FOR BATHING: A LITTLE

## 2024-03-05 ASSESSMENT — PAIN SCALES - GENERAL
PAINLEVEL_OUTOF10: 0 - NO PAIN

## 2024-03-05 NOTE — NURSING NOTE
Skin rounds completed.  Pt noted to have breakdown behind left ear where oxygen tubing is rubbing.  Image taken and wound consult placed.  Remaining skin intact.

## 2024-03-05 NOTE — CARE PLAN
The patient's goals for the shift include      The clinical goals for the shift include Pt will not fall this shift    Over the shift, the patient did not make progress toward the following goals. Barriers to progression include the patient. Recommendations to address these barriers include provide patient with fall safety enviorment.

## 2024-03-05 NOTE — CARE PLAN
The patient's goals for the shift include  LATANYA.    The clinical goals for the shift include Pt will remain HDS throughout shift.

## 2024-03-05 NOTE — SIGNIFICANT EVENT
I attempted to call pt's daughter, Orin, but she did not answer and I left a message for her that we were trying to call her to update her on the patient's condition.    Renny Morrison D.O.

## 2024-03-05 NOTE — PROGRESS NOTES
03/05/24 1518   Discharge Planning   Assistance Needed SW spoke with pt's dtr, Orin Sims [phone].  Explained chain of decision makers and she and her brother would be decision makers for pt.  Explained pt is confused at tjhis point, has a sitter.  She would like physician to call her to explain pt's medical status.  Messaged Dr. Morrison.  Also explained insurance will cover SNF if pt needs rehab and is able to participate in it.  Told her pt may need Medicaid if he needs to stay in a facility long term.

## 2024-03-05 NOTE — PROGRESS NOTES
"  Subjective    Patient appears confused.  He denies specific complaints.    Objective    Vitals  Visit Vitals  BP (!) 94/48 (BP Location: Left arm, Patient Position: Lying)   Pulse 51   Temp 36.6 °C (97.9 °F) (Temporal)   Resp 12   Ht 1.75 m (5' 8.9\")   Wt 80 kg (176 lb 5.9 oz)   SpO2 100%   BMI 26.12 kg/m²   Smoking Status Former   BSA 1.97 m²       Physical Exam   General: Alert.  Patient is oriented to self, but he took his hearing aids out and has difficulty hearing so not completely clear he is oriented to birthday.  No acute distress.    HEENT: Clear sclera.  CVS: RRR.   Lungs: Anteriorly auscultated and CTAB.   Abdomen: Soft.  Nontender.  Bowel sounds present.  Extremities: No pitting edema bilateral ankles.  Psychiatric: Cooperative but appears confused.     IOs    Intake/Output Summary (Last 24 hours) at 3/5/2024 1704  Last data filed at 3/5/2024 0955  Gross per 24 hour   Intake 360 ml   Output 200 ml   Net 160 ml       Labs:   Results from last 72 hours   Lab Units 03/05/24  0712 03/03/24  0535   SODIUM mmol/L 135* 139   POTASSIUM mmol/L 4.0 3.4*   CHLORIDE mmol/L 95* 95*   CO2 mmol/L 33* 36*   BUN mg/dL 10 11   CREATININE mg/dL 0.67 0.65   GLUCOSE mg/dL 86 88   CALCIUM mg/dL 8.3* 8.7   ANION GAP mmol/L 11 11   EGFR mL/min/1.73m*2 >90 >90   PHOSPHORUS mg/dL 3.1  --       Results from last 72 hours   Lab Units 03/05/24  0712 03/03/24  0535   WBC AUTO x10*3/uL 6.9 6.0   HEMOGLOBIN g/dL 12.2* 11.7*   HEMATOCRIT % 39.7* 37.6*   PLATELETS AUTO x10*3/uL 145* 149*      Lab Results   Component Value Date    CALCIUM 8.3 (L) 03/05/2024    PHOS 3.1 03/05/2024      No results found for: \"CRP\"   [unfilled]       Images  Lower extremity venous duplex bilateral  Narrative: Interpreted By:  Alexx Ellsworth,   STUDY:  [facility] LOWER EXTREMITY VENOUS DUPLEX BILATERAL  3/4/2024 1:27 pm      INDICATION:  Elevated D-dimer.      COMPARISON:  03/02/2024.      ACCESSION NUMBER(S):  [id number]      ORDERING " CLINICIAN:  ANTWON RIOS      TECHNIQUE:  Routine ultrasound of the  bilateral lower extremity was performed  with duplex Doppler (color and spectral) evaluation.  Static images  were obtained for remote interpretation.      FINDINGS:  RIGHT LEG:  The  right common femoral, femoral, popliteal, proximal medial  saphenous, and deep femoral veins are patent and free of thrombus.  The veins are normally compressible.  They demonstrate normal phasic  flow and augmentation response.      Visualized segments of the right calf posterior tibial and peroneal  vein segments appear patent and compressible without evidence of  thrombus.      LEFT LEG:  Study is positive for left lower extremity DVT. The left common  femoral vein is partially compressible containing nonocclusive  thrombus. The left femoral vein is noncompressible containing  thrombus, portions of which appear occlusive or near occlusive in the  mid-distal segments. The left popliteal vein is incompletely  compressible containing nonocclusive thrombus.      Thrombus is also evident within the left calf vein posterior tibial  vein and peroneal vein segments.      Impression: POSITIVE FOR LEFT LOWER EXTREMITY DVT. Thrombus demonstrated in the  left common femoral vein, femoral vein, and popliteal vein as well as  the posterior tibial and peroneal vein segments of the left calf.  Some areas of thrombus appear occlusive or near occlusive in these  regions.      No evidence of right lower extremity DVT.      MACRO:  Alexx Ellsworth discussed the significance and urgency of this  critical finding via secure chat with  ANTWON RIOS on 3/4/2024 at  4:21 pm.  (**-RCF-**) Findings:  See findings.      Signed by: Alexx Ellsworth 3/4/2024 4:22 PM  Dictation workstation:   [id number]  ECG 12 lead  Sinus rhythm with Premature atrial complexes  Left axis deviation  Septal infarct , age undetermined  Abnormal ECG  When compared with ECG of 02-MAY-2022 00:06,  Premature  atrial complexes are now Present  Septal infarct is now Present      Meds  Scheduled medications  apixaban, 10 mg, oral, BID   Followed by  [START ON 3/11/2024] apixaban, 5 mg, oral, BID  atorvastatin, 80 mg, oral, Nightly  buprenorphine-naloxone, 1 tablet, sublingual, BID  carbidopa-levodopa, 1 tablet, oral, TID  docusate sodium, 100 mg, oral, BID  escitalopram, 20 mg, oral, Daily  fluticasone furoate-vilanteroL, 1 puff, inhalation, Daily  folic acid, 1 mg, oral, Daily  gabapentin, 300 mg, oral, TID  LORazepam, 2 mg, oral, q6h MASON  nicotine, 1 patch, transdermal, Daily  pantoprazole, 40 mg, oral, Daily before breakfast  potassium chloride, 20 mEq, oral, Daily  predniSONE, 20 mg, oral, Daily  thiamine, 100 mg, oral, Daily  tiotropium, 2 puff, inhalation, Daily  torsemide, 10 mg, oral, Daily  traZODone, 100 mg, oral, Nightly      Continuous medications     PRN medications  PRN medications: albuterol, [Held by provider] busPIRone, oxygen, oxygen, QUEtiapine     Assessment and Plan    Chris Wu is a 68 y.o. male pertinent hx of COPD, Parkinson's disorder, history of factor V Leiden mutation with DVT and PE in the past & appears to not be on anticoagulation currently and is s/p IVC filter, depression/anxiety & PTSD & diastolic CHF who was admitted to the hospital secondary to acute encephalopathy.    Altered mental status/encephalopathy  Likely due to  polypharmacy.  CTA of head and neck reveals no evidence of hemodynamically significant stenosis of the cervical vessels with mild arteriosclerotic narrowing of bilateral carotid bulbs and no evidence of significant stenosis of the large branch vessel cutoffs of the intracranial vessels  CT of the head showed no acute findings  CT of C-spine showed no acute findings  CT of chest/abdomen/pelvis w/ 6 mm right upper lobe nodule  CT of thoracic and lumbar spine showed no evidence of injury  Pelvic x-ray showed pelvic ring intact and no acute fractures  MRI was canceled  as patient initially could not answer questions however patient has significantly improved with medication estimates  Monitor.      Polypharmacy  In review of chart patient is on multiple sedative medications including Seroquel, oxazepam, Vistaril, gabapentin, Suboxone, baclofen, trazodone, Lexapro and BuSpar  The patient's cousin and friend who also cares for the patient reported patient's mentation has significantly worsened over the past month when his medications were adjusted however cannot specify which medication was new  Decreased home medication of baclofen from 10 mg 3 times daily to 5 mg 3 times daily  Have restarted home medication of Suboxone 8/2 1 tab sublingual BID  Blood pressure has been controlled, will continue to hold clonidine 0.1 mg twice daily  Continue escitalopram 20mg every day   Decreased gabapentin 600 mg 3 times daily to 300 mg 3 times daily  With history of oxazepam 30 mg 4 times daily will start Ativan 2mg QID (Oxazepam 30mg equvalent to Ativan 3 mg)  Continue Seroquel 25 mg QID PRN, which is a decrease from his home dose of 50 mg 4 times daily as needed  Continue trazodone 100 mg nightly which has been recently decreased from 150 mg nightly     PCP reported psychiatrist will be continuing Suboxone further recommended a decrease of Ativan 1 mg twice daily  Oxazepam 30 mg 4 times daily prior to admission which is a likely the cause of this admission   If Ativan is weaned to 1 mg twice daily patient may withdraw  Will continue current decreased dose of Ativan and defer further titration to psychiatry as an outpatient    Left lower extremity DVT on ultrasound 3/4/2024 with history of factor V Leiden mutation with prior DVT and PE in the past status post IVC filter placement in the past  -Continue Eliquis, which was started on 3/4/2024.     6 mm right upper lobe nodule  -Follow-up with pulmonology and primary care provider as an outpatient for further evaluation management.      Paroxysmal  "respiratory failure due to COPD  Patient requires oxygen at 3 L a minute at baseline  Continue LABA  Continue prednisone     Inability to care for self  PT OT and social work consulted     Parkinson disease  Continue carbidopa levodopa     Depression ADD/PTSD  Lexapro continue  Holding g BuSpar, trazodone and Seroquel due to concerns for polypharmacy     GERD  Continue PPI  DVT prophylaxis Lovenox and SCDs     Per Dr. Wilcox's note: \"I met with the patient's cousin who called 911 as well as his caretaker/friend today  They both reported a significant decrease in his mentation over the past month after a medication change but could not dictate the medication change  The patient's friend who is very involved in his care reports he feels the patient would be safe to return home if his mental status returns to that what it was a month ago\"  PT and OT have evaluated and recommend placement in a skilled nursing facility  Patient is agreeable to placement in a skilled nursing facility, but will need family member or POA to help make the decision for him due to his confusion.                  "

## 2024-03-06 LAB
ALBUMIN SERPL BCP-MCNC: 3.6 G/DL (ref 3.4–5)
ANION GAP SERPL CALC-SCNC: 9 MMOL/L (ref 10–20)
BASE EXCESS BLDA CALC-SCNC: 17.4 MMOL/L (ref -2–3)
BODY TEMPERATURE: ABNORMAL
BUN SERPL-MCNC: 11 MG/DL (ref 6–23)
BUPRENORPHINE UR-MCNC: 171 NG/ML
BUPRENORPHINE UR-MCNC: <2 NG/ML
CALCIUM SERPL-MCNC: 8.7 MG/DL (ref 8.6–10.3)
CHLORIDE SERPL-SCNC: 94 MMOL/L (ref 98–107)
CO2 SERPL-SCNC: 40 MMOL/L (ref 21–32)
CREAT SERPL-MCNC: 0.85 MG/DL (ref 0.5–1.3)
EGFRCR SERPLBLD CKD-EPI 2021: >90 ML/MIN/1.73M*2
ERYTHROCYTE [DISTWIDTH] IN BLOOD BY AUTOMATED COUNT: 12.3 % (ref 11.5–14.5)
GLUCOSE SERPL-MCNC: 77 MG/DL (ref 74–99)
HCO3 BLDA-SCNC: 46.6 MMOL/L (ref 22–26)
HCT VFR BLD AUTO: 40.3 % (ref 41–52)
HGB BLD-MCNC: 12.7 G/DL (ref 13.5–17.5)
INHALED O2 CONCENTRATION: 36 %
MAGNESIUM SERPL-MCNC: 1.89 MG/DL (ref 1.6–2.4)
MCH RBC QN AUTO: 29 PG (ref 26–34)
MCHC RBC AUTO-ENTMCNC: 31.5 G/DL (ref 32–36)
MCV RBC AUTO: 92 FL (ref 80–100)
NALOXONE UR CFM-MCNC: <100 NG/ML
NORBUPRENORPHINE UR CFM-MCNC: 699 NG/ML
NORBUPRENORPHINE UR-MCNC: 94 NG/ML
NRBC BLD-RTO: 0 /100 WBCS (ref 0–0)
OXYHGB MFR BLDA: 25.3 % (ref 94–98)
PCO2 BLDA: 77 MM HG (ref 38–42)
PH BLDA: 7.39 PH (ref 7.38–7.42)
PHOSPHATE SERPL-MCNC: 3.6 MG/DL (ref 2.5–4.9)
PLATELET # BLD AUTO: 168 X10*3/UL (ref 150–450)
PO2 BLDA: 20 MM HG (ref 85–95)
POTASSIUM SERPL-SCNC: 3.5 MMOL/L (ref 3.5–5.3)
RBC # BLD AUTO: 4.38 X10*6/UL (ref 4.5–5.9)
SAO2 % BLDA: 26 % (ref 94–100)
SODIUM SERPL-SCNC: 139 MMOL/L (ref 136–145)
WBC # BLD AUTO: 7.4 X10*3/UL (ref 4.4–11.3)

## 2024-03-06 PROCEDURE — 97530 THERAPEUTIC ACTIVITIES: CPT | Mod: GO

## 2024-03-06 PROCEDURE — 9420000001 HC RT PATIENT EDUCATION 5 MIN

## 2024-03-06 PROCEDURE — 83735 ASSAY OF MAGNESIUM: CPT | Performed by: INTERNAL MEDICINE

## 2024-03-06 PROCEDURE — 2500000001 HC RX 250 WO HCPCS SELF ADMINISTERED DRUGS (ALT 637 FOR MEDICARE OP): Performed by: NURSE PRACTITIONER

## 2024-03-06 PROCEDURE — 85027 COMPLETE CBC AUTOMATED: CPT | Performed by: INTERNAL MEDICINE

## 2024-03-06 PROCEDURE — 36415 COLL VENOUS BLD VENIPUNCTURE: CPT | Performed by: INTERNAL MEDICINE

## 2024-03-06 PROCEDURE — 99232 SBSQ HOSP IP/OBS MODERATE 35: CPT | Performed by: INTERNAL MEDICINE

## 2024-03-06 PROCEDURE — 2500000001 HC RX 250 WO HCPCS SELF ADMINISTERED DRUGS (ALT 637 FOR MEDICARE OP): Performed by: FAMILY MEDICINE

## 2024-03-06 PROCEDURE — 2500000002 HC RX 250 W HCPCS SELF ADMINISTERED DRUGS (ALT 637 FOR MEDICARE OP, ALT 636 FOR OP/ED): Performed by: INTERNAL MEDICINE

## 2024-03-06 PROCEDURE — 94640 AIRWAY INHALATION TREATMENT: CPT

## 2024-03-06 PROCEDURE — 1200000002 HC GENERAL ROOM WITH TELEMETRY DAILY

## 2024-03-06 PROCEDURE — 2500000002 HC RX 250 W HCPCS SELF ADMINISTERED DRUGS (ALT 637 FOR MEDICARE OP, ALT 636 FOR OP/ED): Performed by: FAMILY MEDICINE

## 2024-03-06 PROCEDURE — 82805 BLOOD GASES W/O2 SATURATION: CPT | Performed by: INTERNAL MEDICINE

## 2024-03-06 PROCEDURE — S4991 NICOTINE PATCH NONLEGEND: HCPCS | Performed by: FAMILY MEDICINE

## 2024-03-06 PROCEDURE — 36600 WITHDRAWAL OF ARTERIAL BLOOD: CPT

## 2024-03-06 PROCEDURE — 2500000004 HC RX 250 GENERAL PHARMACY W/ HCPCS (ALT 636 FOR OP/ED): Performed by: FAMILY MEDICINE

## 2024-03-06 PROCEDURE — 80069 RENAL FUNCTION PANEL: CPT | Performed by: INTERNAL MEDICINE

## 2024-03-06 RX ADMIN — GABAPENTIN 300 MG: 300 CAPSULE ORAL at 18:00

## 2024-03-06 RX ADMIN — THIAMINE HCL TAB 100 MG 100 MG: 100 TAB at 09:00

## 2024-03-06 RX ADMIN — POTASSIUM CHLORIDE 20 MEQ: 1.5 FOR SOLUTION ORAL at 10:09

## 2024-03-06 RX ADMIN — CARBIDOPA AND LEVODOPA 1 TABLET: 25; 100 TABLET ORAL at 18:00

## 2024-03-06 RX ADMIN — BUPRENORPHINE AND NALOXONE 1 TABLET: 8; 2 TABLET SUBLINGUAL at 12:33

## 2024-03-06 RX ADMIN — DOCUSATE SODIUM 100 MG: 100 CAPSULE, LIQUID FILLED ORAL at 21:48

## 2024-03-06 RX ADMIN — GABAPENTIN 300 MG: 300 CAPSULE ORAL at 09:59

## 2024-03-06 RX ADMIN — CARBIDOPA AND LEVODOPA 1 TABLET: 25; 100 TABLET ORAL at 09:58

## 2024-03-06 RX ADMIN — GABAPENTIN 300 MG: 300 CAPSULE ORAL at 21:48

## 2024-03-06 RX ADMIN — ESCITALOPRAM OXALATE 20 MG: 20 TABLET ORAL at 09:58

## 2024-03-06 RX ADMIN — FOLIC ACID 1 MG: 1 TABLET ORAL at 10:14

## 2024-03-06 RX ADMIN — LORAZEPAM 2 MG: 1 TABLET ORAL at 21:48

## 2024-03-06 RX ADMIN — PREDNISONE 20 MG: 20 TABLET ORAL at 09:58

## 2024-03-06 RX ADMIN — NICOTINE 1 PATCH: 21 PATCH, EXTENDED RELEASE TRANSDERMAL at 09:57

## 2024-03-06 RX ADMIN — APIXABAN 10 MG: 5 TABLET, FILM COATED ORAL at 09:58

## 2024-03-06 RX ADMIN — BUPRENORPHINE AND NALOXONE 1 TABLET: 8; 2 TABLET SUBLINGUAL at 21:48

## 2024-03-06 RX ADMIN — LORAZEPAM 2 MG: 1 TABLET ORAL at 03:00

## 2024-03-06 RX ADMIN — TRAZODONE HYDROCHLORIDE 100 MG: 50 TABLET ORAL at 21:48

## 2024-03-06 RX ADMIN — APIXABAN 10 MG: 5 TABLET, FILM COATED ORAL at 21:48

## 2024-03-06 RX ADMIN — TIOTROPIUM BROMIDE INHALATION SPRAY 2 PUFF: 3.12 SPRAY, METERED RESPIRATORY (INHALATION) at 10:17

## 2024-03-06 RX ADMIN — ATORVASTATIN CALCIUM 80 MG: 80 TABLET, FILM COATED ORAL at 21:48

## 2024-03-06 RX ADMIN — FLUTICASONE FUROATE AND VILANTEROL TRIFENATATE 1 PUFF: 200; 25 POWDER RESPIRATORY (INHALATION) at 10:17

## 2024-03-06 ASSESSMENT — COGNITIVE AND FUNCTIONAL STATUS - GENERAL
HELP NEEDED FOR BATHING: A LOT
MOVING FROM LYING ON BACK TO SITTING ON SIDE OF FLAT BED WITH BEDRAILS: A LITTLE
EATING MEALS: A LITTLE
DAILY ACTIVITIY SCORE: 14
DRESSING REGULAR UPPER BODY CLOTHING: A LOT
TOILETING: TOTAL
PERSONAL GROOMING: A LITTLE
TURNING FROM BACK TO SIDE WHILE IN FLAT BAD: A LITTLE
DRESSING REGULAR LOWER BODY CLOTHING: A LOT
DRESSING REGULAR UPPER BODY CLOTHING: A LITTLE
MOVING TO AND FROM BED TO CHAIR: A LOT
STANDING UP FROM CHAIR USING ARMS: A LOT
DRESSING REGULAR LOWER BODY CLOTHING: A LOT
DAILY ACTIVITIY SCORE: 14
HELP NEEDED FOR BATHING: A LOT
PERSONAL GROOMING: A LOT
WALKING IN HOSPITAL ROOM: A LOT
PERSONAL GROOMING: A LITTLE
TOILETING: A LOT
TURNING FROM BACK TO SIDE WHILE IN FLAT BAD: A LITTLE
STANDING UP FROM CHAIR USING ARMS: A LOT
MOBILITY SCORE: 14
DAILY ACTIVITIY SCORE: 13
EATING MEALS: A LITTLE
MOBILITY SCORE: 14
TOILETING: TOTAL
MOVING FROM LYING ON BACK TO SITTING ON SIDE OF FLAT BED WITH BEDRAILS: A LITTLE
MOVING FROM LYING ON BACK TO SITTING ON SIDE OF FLAT BED WITH BEDRAILS: A LITTLE
MOVING TO AND FROM BED TO CHAIR: A LOT
DRESSING REGULAR UPPER BODY CLOTHING: A LITTLE
WALKING IN HOSPITAL ROOM: A LOT
CLIMB 3 TO 5 STEPS WITH RAILING: A LOT
CLIMB 3 TO 5 STEPS WITH RAILING: A LOT
EATING MEALS: A LITTLE
DRESSING REGULAR LOWER BODY CLOTHING: A LOT
HELP NEEDED FOR BATHING: A LOT

## 2024-03-06 ASSESSMENT — PAIN - FUNCTIONAL ASSESSMENT
PAIN_FUNCTIONAL_ASSESSMENT: 0-10
PAIN_FUNCTIONAL_ASSESSMENT: 0-10

## 2024-03-06 ASSESSMENT — PAIN SCALES - GENERAL
PAINLEVEL_OUTOF10: 0 - NO PAIN
PAINLEVEL_OUTOF10: 0 - NO PAIN

## 2024-03-06 NOTE — PROGRESS NOTES
03/06/24 1802   Discharge Planning   Living Arrangements Alone   Support Systems Congregational/beatrice community;Friends/neighbors;Children   Type of Residence Private residence   Number of Stairs to Enter Residence 2   Number of Stairs Within Residence 1   Do you have animals or pets at home? Yes   Who is requesting discharge planning? Provider   Home or Post Acute Services Post acute facilities (Rehab/SNF/etc)   Patient expects to be discharged to: Pt still confused and with sitter.  Physician attempting to reach giselle Lindsay sent records for physician/continuity of care.   Does the patient need discharge transport arranged? Yes   RoundTrip coordination needed? Yes   Has discharge transport been arranged? No

## 2024-03-06 NOTE — PROGRESS NOTES
Pharmacy Medication History Review    Chris Wu is a 68 y.o. male admitted for Encephalopathy. Pharmacy reviewed the patient's [id number] medications and allergies for accuracy.    The list below reflectives the updated PTA list. Please review each medication in order reconciliation for additional clarification and justification.  Medications Prior to Admission   Medication Sig Dispense Refill Last Dose    albuterol 1.25 mg/3 mL nebulizer solution INHALE 1 VIAL VIA NEBULIZER EVERY 4 HOURS AS NEEDED 75 mL 2     albuterol 90 mcg/actuation inhaler INHALE 1-2 PUFFS EVERY 4-6 HOURS AS NEEDED AND AS DIRECTED. (Patient not taking: Reported on 3/4/2024) 18 g 3 Not Taking    Breztri Aerosphere 160-9-4.8 mcg/actuation HFA aerosol inhaler INHALE 2 PUFFS 2 TIMES A DAY. 10.7 g 5     buprenorphine-naloxone (Suboxone) 8-2 mg SL tablet Place 2 tablets under the tongue once daily.       busPIRone (Buspar) 10 mg tablet Take 2 tablets (20 mg) by mouth 3 times a day.       carbidopa-levodopa (Sinemet)  mg tablet TAKE 1 TABLET BY MOUTH THREE TIMES A DAY 84 tablet 1     escitalopram (Lexapro) 20 mg tablet Take 1 tablet (20 mg) by mouth once daily. 90 tablet 1     gabapentin (Neurontin) 600 mg tablet TAKE 1 TABLET (600 MG) BY MOUTH 3 TIMES A DAY. 84 tablet 3     hydrOXYzine HCL (Atarax) 25 mg tablet Take 1 tablet (25 mg) by mouth 2 times a day.       hydrOXYzine pamoate (Vistaril) 25 mg capsule Take 1 capsule (25 mg) by mouth once daily at bedtime.       naloxone (Narcan) 4 mg/0.1 mL nasal spray Administer 1 spray (4 mg) into affected nostril(s) if needed for opioid reversal. May repeat every 2-3 minutes if needed, alternating nostrils, until medical assistance becomes available. 2 each 0     omeprazole (PriLOSEC) 40 mg DR capsule TAKE 1 CAPSULE BY MOUTH EVERY MORNING BEFORE MEALS 28 capsule 3     potassium chloride CR (Klor-Con M20) 20 mEq ER tablet Take 1 tablet (20 mEq) by mouth once daily. Do not crush or chew. 30  tablet 11     predniSONE (Deltasone) 20 mg tablet TAKE 1 TABLET (20 MG) BY MOUTH ONCE DAILY. 28 tablet 2     propranolol LA (Inderal LA) 160 mg 24 hr capsule Take 1 capsule (160 mg) by mouth once daily. Do not crush, chew, or split. (Patient not taking: Reported on 3/4/2024) 90 capsule 1 Not Taking    QUEtiapine (SeroqueL) 50 mg tablet Take 1 tablet (50 mg) by mouth 4 times a day as needed (for anxiety). 320 tablet 5     torsemide (Demadex) 10 mg tablet Take 1 tablet (10 mg) by mouth once daily. 30 tablet 11     traZODone (Desyrel) 100 mg tablet Take 1 tablet (100 mg) by mouth once daily at bedtime.           The list below reflectives the updated allergy list. Please review each documented allergy for additional clarification and justification.  Allergies  Reviewed by KENYATTA Odonnell-MARILIN on 3/1/2024        Severity Reactions Comments    Aspirin Not Specified Other GI upset            Below are additional concerns with the patient's PTA list.  Confirmed medications based on fill history as patient is confused and patient's family member Rahel did not know his medications. Patient gets medications filled at [facility]. Medication list updated as best as possible.     Marco Johnson RPh

## 2024-03-06 NOTE — CARE PLAN
The patient's goals for the shift include  LATANYA    The clinical goals for the shift include Pt will not have a fall during shift      Problem: Respiratory  Goal: Clear secretions with interventions this shift  Outcome: Progressing  Goal: Minimize anxiety/maximize coping throughout shift  Outcome: Progressing  Goal: Minimal/no exertional discomfort or dyspnea this shift  Outcome: Progressing  Goal: No signs of respiratory distress (eg. Use of accessory muscles. Peds grunting)  Outcome: Progressing  Goal: Patent airway maintained this shift  Outcome: Progressing  Goal: Tolerate mechanical ventilation evidenced by VS/agitation level this shift  Outcome: Progressing  Goal: Tolerate pulmonary toileting this shift  Outcome: Progressing  Goal: Verbalize decreased shortness of breath this shift  Outcome: Progressing  Goal: Wean oxygen to maintain O2 saturation per order/standard this shift  Outcome: Progressing  Goal: Increase self care and/or family involvement in next 24 hours  Outcome: Progressing     Problem: Skin  Goal: Decreased wound size/increased tissue granulation at next dressing change  Outcome: Progressing  Goal: Participates in plan/prevention/treatment measures  Outcome: Progressing  Goal: Prevent/manage excess moisture  Outcome: Progressing  Goal: Prevent/minimize sheer/friction injuries  Outcome: Progressing  Goal: Promote/optimize nutrition  Outcome: Progressing  Goal: Promote skin healing  Outcome: Progressing

## 2024-03-06 NOTE — NURSING NOTE
Patient seen on PI study rounds, 2 wounds noted on outer top of ear where O2 tubing and hearing aids rest. Dried crusty yellow, red drainage in hair, wounds cleansed with NS, wound beds pink. Both stage ll.    Left ear- 1.5 x 1 x 0.3 cm extra thin duoderm, change q 3 days.  Right ear- 1 x 0.5 x 0.1 cm extra thin duoderm, change q 3 days.  Orders obtained for wound care moist wound healing with above dressing.  Nursing updated.

## 2024-03-06 NOTE — PROGRESS NOTES
"  Subjective    Patient reports breathing is okay. He appears confused but denies acute complaints.     Objective    Vitals  Visit Vitals  /55 (BP Location: Left arm, Patient Position: Lying)   Pulse 50   Temp 36.3 °C (97.3 °F) (Temporal)   Resp 15   Ht 1.75 m (5' 8.9\")   Wt 80 kg (176 lb 5.9 oz)   SpO2 90%   BMI 26.12 kg/m²   Smoking Status Former   BSA 1.97 m²       Physical Exam   General: Alert.  Patient is oriented to self, but does not really answer questions as far as his birthday. NAD.  acute distress.    HEENT: Clear sclera.  CVS: RRR.   Lungs: CTAB.   Abdomen: Soft.  NT. +BS.  Extremities: No pitting edema bilat ankles.  Psychiatric: Cooperative but appears confused.     IOs    Intake/Output Summary (Last 24 hours) at 3/6/2024 1449  Last data filed at 3/6/2024 0955  Gross per 24 hour   Intake 340 ml   Output 400 ml   Net -60 ml       Labs:   Results from last 72 hours   Lab Units 03/06/24  0629 03/05/24  0712   SODIUM mmol/L 139 135*   POTASSIUM mmol/L 3.5 4.0   CHLORIDE mmol/L 94* 95*   CO2 mmol/L 40* 33*   BUN mg/dL 11 10   CREATININE mg/dL 0.85 0.67   GLUCOSE mg/dL 77 86   CALCIUM mg/dL 8.7 8.3*   ANION GAP mmol/L 9* 11   EGFR mL/min/1.73m*2 >90 >90   PHOSPHORUS mg/dL 3.6 3.1      Results from last 72 hours   Lab Units 03/06/24  0629 03/05/24  0712   WBC AUTO x10*3/uL 7.4 6.9   HEMOGLOBIN g/dL 12.7* 12.2*   HEMATOCRIT % 40.3* 39.7*   PLATELETS AUTO x10*3/uL 168 145*      Lab Results   Component Value Date    CALCIUM 8.7 03/06/2024    PHOS 3.6 03/06/2024      No results found for: \"CRP\"   [unfilled]       Images  Lower extremity venous duplex bilateral  Narrative: Interpreted By:  Alexx Ellsworth,   STUDY:  [facility] LOWER EXTREMITY VENOUS DUPLEX BILATERAL  3/4/2024 1:27 pm      INDICATION:  Elevated D-dimer.      COMPARISON:  03/02/2024.      ACCESSION NUMBER(S):  [id number]      ORDERING CLINICIAN:  ANTWON RIOS      TECHNIQUE:  Routine ultrasound of the  bilateral lower extremity was " performed  with duplex Doppler (color and spectral) evaluation.  Static images  were obtained for remote interpretation.      FINDINGS:  RIGHT LEG:  The  right common femoral, femoral, popliteal, proximal medial  saphenous, and deep femoral veins are patent and free of thrombus.  The veins are normally compressible.  They demonstrate normal phasic  flow and augmentation response.      Visualized segments of the right calf posterior tibial and peroneal  vein segments appear patent and compressible without evidence of  thrombus.      LEFT LEG:  Study is positive for left lower extremity DVT. The left common  femoral vein is partially compressible containing nonocclusive  thrombus. The left femoral vein is noncompressible containing  thrombus, portions of which appear occlusive or near occlusive in the  mid-distal segments. The left popliteal vein is incompletely  compressible containing nonocclusive thrombus.      Thrombus is also evident within the left calf vein posterior tibial  vein and peroneal vein segments.      Impression: POSITIVE FOR LEFT LOWER EXTREMITY DVT. Thrombus demonstrated in the  left common femoral vein, femoral vein, and popliteal vein as well as  the posterior tibial and peroneal vein segments of the left calf.  Some areas of thrombus appear occlusive or near occlusive in these  regions.      No evidence of right lower extremity DVT.      MACRO:  Alexx Ellsworth discussed the significance and urgency of this  critical finding via secure chat with  ANTWON RIOS on 3/4/2024 at  4:21 pm.  (**-F-**) Findings:  See findings.      Signed by: Alexx Ellsworth 3/4/2024 4:22 PM  Dictation workstation:   [id number]  ECG 12 lead  Sinus rhythm with Premature atrial complexes  Left axis deviation  Septal infarct , age undetermined  Abnormal ECG  When compared with ECG of 02-MAY-2022 00:06,  Premature atrial complexes are now Present  Septal infarct is now Present      Meds  Scheduled  medications  apixaban, 10 mg, oral, BID   Followed by  [START ON 3/11/2024] apixaban, 5 mg, oral, BID  atorvastatin, 80 mg, oral, Nightly  buprenorphine-naloxone, 1 tablet, sublingual, BID  carbidopa-levodopa, 1 tablet, oral, TID  docusate sodium, 100 mg, oral, BID  escitalopram, 20 mg, oral, Daily  fluticasone furoate-vilanteroL, 1 puff, inhalation, Daily  folic acid, 1 mg, oral, Daily  gabapentin, 300 mg, oral, TID  LORazepam, 2 mg, oral, q6h MASON  nicotine, 1 patch, transdermal, Daily  pantoprazole, 40 mg, oral, Daily before breakfast  potassium chloride, 20 mEq, oral, Daily  predniSONE, 20 mg, oral, Daily  thiamine, 100 mg, oral, Daily  tiotropium, 2 puff, inhalation, Daily  [Held by provider] torsemide, 10 mg, oral, Daily  traZODone, 100 mg, oral, Nightly      Continuous medications     PRN medications  PRN medications: albuterol, [Held by provider] busPIRone, oxygen, oxygen, QUEtiapine     Assessment and Plan    Chris Wu is a 68 y.o. male with pertinent hx of COPD, Parkinson's disorder, history of factor V Leiden mutation with DVT and PE in the past & appears to not be on anticoagulation currently and is s/p IVC filter, depression/anxiety & PTSD & diastolic CHF who was admitted to the hospital secondary to acute encephalopathy.     Altered mental status/encephalopathy  Likely due to  polypharmacy.  CTA of head and neck reveals no evidence of hemodynamically significant stenosis of the cervical vessels with mild arteriosclerotic narrowing of bilateral carotid bulbs and no evidence of significant stenosis of the large branch vessel cutoffs of the intracranial vessels  CT of the head showed no acute findings  CT of C-spine showed no acute findings  CT of chest/abdomen/pelvis w/ 6 mm right upper lobe nodule  CT of thoracic and lumbar spine showed no evidence of injury  Pelvic x-ray showed pelvic ring intact and no acute fractures  MRI was canceled as patient initially could not answer questions however  patient has significantly improved with medication estimates  Neurology evaluated the patient as well.  I spoke with patient's daughter, Orin, on the phone today, and updated her on patient's condition. She has been in contact with her father over the past 6 months, but had not been in contact with him for several years prior, and she states that she felt patient likely had some cognitive decline at least to some degree.  I discussed with her that I am speculating if patient has some underlying dementia, that may be undiagnosed, low with underlying psychiatric issues, along with recent medication changes causing his symptoms.  We are waiting records from [place]  psychiatry as well to review the records. I also spoke with patient's friend, Blu, today, who also reports that over the past month he had had increased confusion after having medications adjusted.  Monitor.      Polypharmacy  In review of chart patient is on multiple sedative medications including Seroquel, oxazepam, Vistaril, gabapentin, Suboxone, baclofen, trazodone, Lexapro and BuSpar  The patient's cousin and friend who also cares for the patient reported patient's mentation has significantly worsened over the past month when his medications were adjusted however cannot specify which medication was new  Decreased home medication of baclofen from 10 mg 3 times daily to 5 mg 3 times daily  Have restarted home medication of Suboxone 8/2 1 tab sublingual BID  Blood pressure has been controlled, will continue to hold clonidine 0.1 mg twice daily  Continue escitalopram 20mg every day   Decreased gabapentin 600 mg 3 times daily to 300 mg 3 times daily  With history of oxazepam 30 mg 4 times daily will start Ativan 2mg QID (Oxazepam 30mg equvalent to Ativan 3 mg)  Continue Seroquel 25 mg QID PRN, which is a decrease from his home dose of 50 mg 4 times daily as needed  Continue trazodone 100 mg nightly which has been recently decreased from 150 mg  "nightly     PCP reported psychiatrist will be continuing Suboxone further recommended a decrease of Ativan 1 mg twice daily  Oxazepam 30 mg 4 times daily prior to admission which is a likely the cause of this admission   If Ativan is weaned to 1 mg twice daily patient may withdraw  Will continue current decreased dose of Ativan and defer further titration to psychiatry as an outpatient     Left lower extremity DVT on ultrasound 3/4/2024 with history of factor V Leiden mutation with prior DVT and PE in the past status post IVC filter placement in the past  -Continue Eliquis, which was started on 3/4/2024.     6 mm right upper lobe nodule  -Follow-up with pulmonology and primary care provider as an outpatient for further evaluation management.       Paroxysmal respiratory failure due to COPD  Patient requires oxygen at 3 L a minute at baseline  Continue LABA  Continue prednisone     Inability to care for self  PT OT and social work consulted     Parkinson disease  Continue carbidopa levodopa     Depression ADD/PTSD  Lexapro continue  Holding g BuSpar, trazodone and Seroquel due to concerns for polypharmacy     GERD  Continue PPI  DVT prophylaxis Lovenox and SCDs     Per Dr. Wilcox's note: \"I met with the patient's cousin who called 911 as well as his caretaker/friend today  They both reported a significant decrease in his mentation over the past month after a medication change but could not dictate the medication change  The patient's friend who is very involved in his care reports he feels the patient would be safe to return home if his mental status returns to that what it was a month ago\"  PT and OT have evaluated and recommend placement in a skilled nursing facility  Patient is agreeable to placement in a skilled nursing facility, but will need family member or POA to help make the decision for him due to his confusion.                      "

## 2024-03-06 NOTE — DISCHARGE INSTRUCTIONS
Left ear- cleanse with NS, apply extra thin duoderm, change q 3 days  Right ear-  cleanse with NS, apply extra thin duoderm, change q 3 days       Lung nodule was incidentally noted on your CT scan. Recommend following up with your PCP/pulmonology (lung doctor) regarding this.

## 2024-03-06 NOTE — CARE PLAN
The clinical goals for the shift include Pt will not have a fall during shift    Problem: Skin  Goal: Decreased wound size/increased tissue granulation at next dressing change  Outcome: Progressing  Goal: Participates in plan/prevention/treatment measures  Outcome: Progressing  Goal: Promote skin healing  Outcome: Progressing

## 2024-03-06 NOTE — PROGRESS NOTES
Physical Therapy                 Therapy Communication Note    Patient Name: Chris Wu  MRN: [id number]  Today's Date: 3/6/2024     Discipline: Physical Therapy    Missed Visit Reason: Missed Visit Reason: Patient placed on medical hold (Nurse states she is unable to arouse patient to even provide medication. Getting blood gas. Unable to provide PT services at this time)    Missed Time: Attempt    Comment: Attempt Time 945

## 2024-03-06 NOTE — PROGRESS NOTES
Occupational Therapy    Occupational Therapy Treatment    Name: Chris Wu  MRN: [id number]  : 1955  Date: 24  Time Calculation  Start Time: 1403  Stop Time: 1422  Time Calculation (min): 19 min    Assessment:  OT Assessment: Pt making fair progress towards OT goals. Would still benefit from continued skilled OT to maximize pt safety and general outcomes  Prognosis: Good  Barriers to Discharge: None  Evaluation/Treatment Tolerance: Patient limited by fatigue  Medical Staff Made Aware: Yes  End of Session Communication: Bedside nurse, PCT/NA/KEVON  End of Session Patient Position: Bed, 4 rail up, Alarm on (sitter present)  Plan:  Treatment Interventions: ADL retraining, Functional transfer training, UE strengthening/ROM, Endurance training, Compensatory technique education  OT Frequency: 3 times per week  OT Discharge Recommendations: Moderate intensity level of continued care  OT - OK to Discharge: Yes    Subjective   Previous Visit Info:  OT Last Visit  OT Received On: 24  General:  General  Reason for Referral: 67 yo male admitted for AMS  Past Medical History Relevant to Rehab: PMH: COPD, Parkinson's disorder, history of factor V Leiden mutation with DVT and PE in the past & appears to not be on anticoagulation currently and is s/p IVC filter, depression/anxiety & PTSD & diastolic CHF  Family/Caregiver Present: Yes  Prior to Session Communication: Bedside nurse, PCT/NA/CTA  Patient Position Received: Bed, 3 rail up, Alarm on (sitter present)  General Comment: Pt pleasantly confused, inappropriate with female sitter, but able to participate in therapy. 4L O2, tele in place. Difficulty following simple 1-step directions  Precautions:  Medical Precautions: Fall precautions     Pain Assessment:  Pain Assessment  Pain Assessment: 0-10  Pain Score: 0 - No pain     Objective   Functional Standing Tolerance:  Functional Standing Tolerance  Time: ~8 minutes  Activity: functional transfers/cognitive  reorientation  Functional Standing Tolerance Comments: Completed stance at WW, pt required redirection and manual intervention to maintain safe positioning  Bed Mobility/Transfers: Bed Mobility  Bed Mobility: Yes  Bed Mobility 1  Bed Mobility 1: Supine to sitting  Level of Assistance 1: Contact guard  Bed Mobility Comments 1: HOB slightly elevated, use of bed rails  Bed Mobility 2  Bed Mobility  2: Sitting to supine  Level of Assistance 2: Maximum assistance  Bed Mobility Comments 2: Pt unable to follow directions to get into bed    Transfers  Transfer: Yes  Transfer 1  Transfer From 1: Sit to  Transfer to 1: Stand  Technique 1: Sit to stand, Stand to sit  Transfer Device 1: Walker  Transfer Level of Assistance 1: Minimum assistance  Trials/Comments 1: 2 trials with notable LLE/UE weakness; verbal cues for direction, walker management, maintaining alertness    Sitting Balance:  Static Sitting Balance  Static Sitting-Balance Support: Feet supported  Static Sitting-Level of Assistance: Contact guard  Static Sitting-Comment/Number of Minutes: ~8 minutes unsupported at EOB; targeted improved endurance, improved balance.    Outcome Measures:  [facility] Daily Activity  Putting on and taking off regular lower body clothing: A lot  Bathing (including washing, rinsing, drying): A lot  Putting on and taking off regular upper body clothing: A little  Toileting, which includes using toilet, bedpan or urinal: Total  Taking care of personal grooming such as brushing teeth: A little  Eating Meals: A little  Daily Activity - Total Score: 14      Education Documentation  Body Mechanics, taught by Hieu Elise OT at 3/6/2024  2:50 PM.  Learner: Patient  Readiness: Acceptance  Method: Explanation  Response: Needs Reinforcement    Education Comments  No comments found.      Goals:  Encounter Problems       Encounter Problems (Active)       OT Goals       Pt will demo functional transfers to/ from EOB, chair and commode sup and LRD  (Progressing)       Start:  03/04/24    Expected End:  03/18/24            Pt will demo functional mobility necessary to complete ADL routine sup and LRD (Progressing)       Start:  03/04/24    Expected End:  03/18/24            Pt will demo ADL routine and meaningful daily activities with sup using modifications as needed  (Progressing)       Start:  03/04/24    Expected End:  03/18/24            Pt will demo improved static/ dynamic standing balance, evidenced by completion of BADL or functional activity with < SB assist for duration of activity.   (Progressing)       Start:  03/04/24    Expected End:  03/18/24            Pt will demo improved activity tolerance evidenced by participation in BADL and/or functional mobility x10 mins with </=1 rest break.   (Progressing)       Start:  03/04/24    Expected End:  03/18/24

## 2024-03-07 LAB
ALBUMIN SERPL BCP-MCNC: 3.3 G/DL (ref 3.4–5)
ANION GAP SERPL CALC-SCNC: 8 MMOL/L (ref 10–20)
BUN SERPL-MCNC: 13 MG/DL (ref 6–23)
CALCIUM SERPL-MCNC: 8.3 MG/DL (ref 8.6–10.3)
CHLORIDE SERPL-SCNC: 95 MMOL/L (ref 98–107)
CO2 SERPL-SCNC: 38 MMOL/L (ref 21–32)
CREAT SERPL-MCNC: 0.78 MG/DL (ref 0.5–1.3)
EGFRCR SERPLBLD CKD-EPI 2021: >90 ML/MIN/1.73M*2
ERYTHROCYTE [DISTWIDTH] IN BLOOD BY AUTOMATED COUNT: 12.5 % (ref 11.5–14.5)
GLUCOSE SERPL-MCNC: 89 MG/DL (ref 74–99)
HCT VFR BLD AUTO: 36 % (ref 41–52)
HGB BLD-MCNC: 11.3 G/DL (ref 13.5–17.5)
MCH RBC QN AUTO: 29 PG (ref 26–34)
MCHC RBC AUTO-ENTMCNC: 31.4 G/DL (ref 32–36)
MCV RBC AUTO: 93 FL (ref 80–100)
NRBC BLD-RTO: 0 /100 WBCS (ref 0–0)
PHOSPHATE SERPL-MCNC: 3.8 MG/DL (ref 2.5–4.9)
PLATELET # BLD AUTO: 148 X10*3/UL (ref 150–450)
POTASSIUM SERPL-SCNC: 3.3 MMOL/L (ref 3.5–5.3)
RBC # BLD AUTO: 3.89 X10*6/UL (ref 4.5–5.9)
SODIUM SERPL-SCNC: 138 MMOL/L (ref 136–145)
WBC # BLD AUTO: 5.9 X10*3/UL (ref 4.4–11.3)

## 2024-03-07 PROCEDURE — 2500000004 HC RX 250 GENERAL PHARMACY W/ HCPCS (ALT 636 FOR OP/ED): Performed by: FAMILY MEDICINE

## 2024-03-07 PROCEDURE — 2500000001 HC RX 250 WO HCPCS SELF ADMINISTERED DRUGS (ALT 637 FOR MEDICARE OP): Performed by: FAMILY MEDICINE

## 2024-03-07 PROCEDURE — 85027 COMPLETE CBC AUTOMATED: CPT | Performed by: INTERNAL MEDICINE

## 2024-03-07 PROCEDURE — 1200000002 HC GENERAL ROOM WITH TELEMETRY DAILY

## 2024-03-07 PROCEDURE — 2500000002 HC RX 250 W HCPCS SELF ADMINISTERED DRUGS (ALT 637 FOR MEDICARE OP, ALT 636 FOR OP/ED): Performed by: FAMILY MEDICINE

## 2024-03-07 PROCEDURE — 36415 COLL VENOUS BLD VENIPUNCTURE: CPT | Performed by: INTERNAL MEDICINE

## 2024-03-07 PROCEDURE — S4991 NICOTINE PATCH NONLEGEND: HCPCS | Performed by: FAMILY MEDICINE

## 2024-03-07 PROCEDURE — 97530 THERAPEUTIC ACTIVITIES: CPT | Mod: GP

## 2024-03-07 PROCEDURE — 2500000002 HC RX 250 W HCPCS SELF ADMINISTERED DRUGS (ALT 637 FOR MEDICARE OP, ALT 636 FOR OP/ED): Performed by: INTERNAL MEDICINE

## 2024-03-07 PROCEDURE — 80069 RENAL FUNCTION PANEL: CPT | Performed by: INTERNAL MEDICINE

## 2024-03-07 PROCEDURE — 2500000001 HC RX 250 WO HCPCS SELF ADMINISTERED DRUGS (ALT 637 FOR MEDICARE OP): Performed by: NURSE PRACTITIONER

## 2024-03-07 PROCEDURE — 99232 SBSQ HOSP IP/OBS MODERATE 35: CPT | Performed by: INTERNAL MEDICINE

## 2024-03-07 RX ORDER — POTASSIUM CHLORIDE 20 MEQ/1
20 TABLET, EXTENDED RELEASE ORAL ONCE
Status: DISCONTINUED | OUTPATIENT
Start: 2024-03-07 | End: 2024-03-15

## 2024-03-07 RX ADMIN — APIXABAN 10 MG: 5 TABLET, FILM COATED ORAL at 11:28

## 2024-03-07 RX ADMIN — GABAPENTIN 300 MG: 300 CAPSULE ORAL at 11:28

## 2024-03-07 RX ADMIN — LORAZEPAM 2 MG: 1 TABLET ORAL at 18:03

## 2024-03-07 RX ADMIN — ESCITALOPRAM OXALATE 20 MG: 20 TABLET ORAL at 11:28

## 2024-03-07 RX ADMIN — CARBIDOPA AND LEVODOPA 1 TABLET: 25; 100 TABLET ORAL at 11:28

## 2024-03-07 RX ADMIN — FOLIC ACID 1 MG: 1 TABLET ORAL at 11:27

## 2024-03-07 RX ADMIN — LORAZEPAM 2 MG: 1 TABLET ORAL at 03:00

## 2024-03-07 RX ADMIN — DOCUSATE SODIUM 100 MG: 100 CAPSULE, LIQUID FILLED ORAL at 11:27

## 2024-03-07 RX ADMIN — BUPRENORPHINE AND NALOXONE 1 TABLET: 8; 2 TABLET SUBLINGUAL at 11:50

## 2024-03-07 RX ADMIN — THIAMINE HCL TAB 100 MG 100 MG: 100 TAB at 11:27

## 2024-03-07 RX ADMIN — POTASSIUM CHLORIDE 20 MEQ: 1.5 FOR SOLUTION ORAL at 11:27

## 2024-03-07 RX ADMIN — LORAZEPAM 2 MG: 1 TABLET ORAL at 11:28

## 2024-03-07 RX ADMIN — CARBIDOPA AND LEVODOPA 1 TABLET: 25; 100 TABLET ORAL at 18:03

## 2024-03-07 RX ADMIN — GABAPENTIN 300 MG: 300 CAPSULE ORAL at 18:03

## 2024-03-07 RX ADMIN — PREDNISONE 20 MG: 20 TABLET ORAL at 11:28

## 2024-03-07 RX ADMIN — NICOTINE 1 PATCH: 21 PATCH, EXTENDED RELEASE TRANSDERMAL at 11:27

## 2024-03-07 ASSESSMENT — COGNITIVE AND FUNCTIONAL STATUS - GENERAL
EATING MEALS: A LOT
TURNING FROM BACK TO SIDE WHILE IN FLAT BAD: A LITTLE
PERSONAL GROOMING: TOTAL
CLIMB 3 TO 5 STEPS WITH RAILING: TOTAL
WALKING IN HOSPITAL ROOM: A LOT
PERSONAL GROOMING: A LITTLE
CLIMB 3 TO 5 STEPS WITH RAILING: TOTAL
TOILETING: A LOT
HELP NEEDED FOR BATHING: TOTAL
HELP NEEDED FOR BATHING: A LOT
MOBILITY SCORE: 13
EATING MEALS: A LITTLE
STANDING UP FROM CHAIR USING ARMS: A LOT
MOVING FROM LYING ON BACK TO SITTING ON SIDE OF FLAT BED WITH BEDRAILS: A LITTLE
MOVING TO AND FROM BED TO CHAIR: A LOT
DAILY ACTIVITIY SCORE: 14
TURNING FROM BACK TO SIDE WHILE IN FLAT BAD: A LITTLE
STANDING UP FROM CHAIR USING ARMS: A LOT
DRESSING REGULAR LOWER BODY CLOTHING: TOTAL
MOBILITY SCORE: 13
TOILETING: TOTAL
MOVING TO AND FROM BED TO CHAIR: A LOT
DRESSING REGULAR LOWER BODY CLOTHING: A LOT
MOBILITY SCORE: 14
DRESSING REGULAR UPPER BODY CLOTHING: TOTAL
DRESSING REGULAR UPPER BODY CLOTHING: A LOT
MOVING FROM LYING ON BACK TO SITTING ON SIDE OF FLAT BED WITH BEDRAILS: A LITTLE
DAILY ACTIVITIY SCORE: 7
WALKING IN HOSPITAL ROOM: A LOT
WALKING IN HOSPITAL ROOM: A LOT
MOVING TO AND FROM BED TO CHAIR: A LOT
CLIMB 3 TO 5 STEPS WITH RAILING: A LOT
STANDING UP FROM CHAIR USING ARMS: A LOT
MOVING FROM LYING ON BACK TO SITTING ON SIDE OF FLAT BED WITH BEDRAILS: A LITTLE
TURNING FROM BACK TO SIDE WHILE IN FLAT BAD: A LITTLE

## 2024-03-07 ASSESSMENT — PAIN SCALES - GENERAL
PAINLEVEL_OUTOF10: 0 - NO PAIN
PAINLEVEL_OUTOF10: 0 - NO PAIN

## 2024-03-07 ASSESSMENT — PAIN - FUNCTIONAL ASSESSMENT: PAIN_FUNCTIONAL_ASSESSMENT: 0-10

## 2024-03-07 NOTE — CARE PLAN
The patient's goals for the shift include      The clinical goals for the shift include Pt will not have a fall during shift    Over the shift, the patient remained in bed throughout the shift. Patient did not have any falls or near falls. Patient slept most of the night without incident.

## 2024-03-07 NOTE — CARE PLAN
Problem: Skin  Goal: Participates in plan/prevention/treatment measures  Flowsheets (Taken 3/7/2024 5895)  Participates in plan/prevention/treatment measures: Elevate heels      The clinical goals for the shift include Patient will maintain appropriate behavior throughout shift    Sitter remains at bedside. Patient slept off and on throughout shift. Worked with PT/OT. Continue to monitor medications/side effects.

## 2024-03-07 NOTE — PROGRESS NOTES
Physical Therapy    Physical Therapy Treatment    Patient Name: Chris Wu  MRN: [id number]  Today's Date: 3/7/2024  Time Calculation  Start Time: 1356  Stop Time: 1413  Time Calculation (min): 17 min       Assessment/Plan   PT Assessment  PT Assessment Results: Decreased strength, Decreased range of motion, Decreased endurance, Impaired balance, Decreased mobility, Decreased cognition, Decreased safety awareness, Impaired judgement, Impaired hearing  Rehab Prognosis: Fair (d/t cognition)  Barriers to Discharge: cognition  Evaluation/Treatment Tolerance: Other (Comment) (pt limited d/t cognition)  Medical Staff Made Aware: Yes  Barriers to Participation: Ability to acquire knowledge  End of Session Communication: Bedside nurse  Assessment Comment: pt continues to demonstrate impaired mobility, balance, endurance and safety awareness; pt is unable to follow simple commands for mobility d/t cognition and demonstrates severe impulsivity and decreased safety awareness as assessed with mobility; Pt would benefit from continued skilled therapy to address impairements if ability to follow instructions for basic mobility improves.  End of Session Patient Position: Bed, 3 rail up, Alarm on  PT Plan  Inpatient/Swing Bed or Outpatient: Inpatient  PT Plan  Treatment/Interventions: Bed mobility, Transfer training, Gait training, Balance training, Strengthening, Endurance training, Range of motion, Therapeutic exercise, Therapeutic activity  PT Plan: Skilled PT  PT Frequency: 3 times per week  PT Discharge Recommendations: Moderate intensity level of continued care  Equipment Recommended upon Discharge: Wheeled walker  PT Recommended Transfer Status: Assist x2  PT - OK to Discharge: Yes (Per PT POC)      General Visit Information:   PT  Visit  PT Received On: 03/07/24  Response to Previous Treatment: Patient unable to report, no changes reported from family or staff  General  Reason for Referral: 68 YOM admitted d/t AMS,  referred to PT for impaired mobility  Referred By: KENYATTA Odonnell-CNP  Past Medical History Relevant to Rehab: PMH: COPD, Parkinson's disorder, history of factor V Leiden mutation with DVT and PE in the past & appears to not be on anticoagulation currently and is s/p IVC filter, depression/anxiety & PTSD & diastolic CHF  Prior to Session Communication: Bedside nurse, PCT/NA/CTA  Patient Position Received: Bed, 3 rail up, Alarm on (sitter EOB)  General Comment: pt demonstrated confusion throughout session; supine in bed upon arrival with sitter EOB; pt did not demonstrate ability to follow instructions for purposful activity; pt also demonstrated sudden somnolent behavior sitting EOB, RN reports sedative medications given prior to session; telemetry and 4L O2 attached    Subjective   Precautions:  Precautions  Medical Precautions: Fall precautions  Vital Signs:  Vital Signs  Heart Rate: 100  Heart Rate Source: Monitor  Resp: 18  SpO2: 95 %  BP: 106/73  MAP (mmHg): 84  BP Location: Left arm  BP Method: Automatic  Patient Position: Sitting    Objective   Cognition:  Cognition  Overall Cognitive Status:  (pt disoriented to location and situation at least through conversation)  Postural Control:  Static Sitting Balance  Static Sitting-Balance Support: No upper extremity supported, Feet supported  Static Sitting-Level of Assistance: Close supervision  Static Sitting-Comment/Number of Minutes: SBA; ~1min  Static Standing Balance  Static Standing-Balance Support: Bilateral upper extremity supported  Static Standing-Level of Assistance: Contact guard  Static Standing-Comment/Number of Minutes: CGA; FWW; gaitbelt    Activity Tolerance:  Activity Tolerance  Endurance: Tolerates less than 10 min exercise, no significant change in vital signs    Treatments:  Bed Mobility  Bed Mobility: Yes  Bed Mobility 1  Bed Mobility 1: Supine to sitting  Level of Assistance 1: Close supervision  Bed Mobility Comments 1: SBA; HOB  elevated  Bed Mobility 2  Bed Mobility  2: Sitting to supine  Level of Assistance 2: Maximum assistance  Bed Mobility Comments 2: MaxA d/t pt's inability to follow simple commands    Ambulation/Gait Training  Ambulation/Gait Training Performed: Yes  Ambulation/Gait Training 1  Surface 1: Level tile  Device 1: Rolling walker (FWW)  Gait Support Devices: Gait belt  Assistance 1: Minimum assistance  Quality of Gait 1: Forward flexed posture, Inconsistent stride length, Shuffling gait  Comments/Distance (ft) 1: 20ft FWW, gaitbelt, Arielle to limit distance between walker and pt; +1 to assist with lines d/t decreased safety awareness of pt and impulsivity  Transfers  Transfer: Yes  Transfer 1  Transfer From 1: Sit to  Transfer to 1: Stand  Technique 1: Sit to stand, Stand to sit  Transfer Device 1: Walker (FWW)  Transfer Level of Assistance 1: Moderate assistance  Trials/Comments 1: ModA to initiate standing and ModA to get the pt to sit down on EOB    Outcome Measures:  [facility] Basic Mobility  Turning from your back to your side while in a flat bed without using bedrails: A little  Moving from lying on your back to sitting on the side of a flat bed without using bedrails: A little  Moving to and from bed to chair (including a wheelchair): A lot  Standing up from a chair using your arms (e.g. wheelchair or bedside chair): A lot  To walk in hospital room: A lot  Climbing 3-5 steps with railing: Total  Basic Mobility - Total Score: 13    Education Documentation  Precautions, taught by KAY Shore at 3/7/2024  2:30 PM.  Learner: Patient  Readiness: Nonacceptance  Method: Explanation  Response: No Evidence of Learning    Mobility Training, taught by KAY Shore at 3/7/2024  2:30 PM.  Learner: Patient  Readiness: Nonacceptance  Method: Explanation  Response: No Evidence of Learning    Education Comments  No comments found.      Encounter Problems       Encounter Problems (Active)       Balance       STG - Maintains  static standing balance with unilateral upper extremity support x 5+ min in order to complete a functional task with SBA (Not Progressing)       Start:  03/04/24    Expected End:  03/18/24       INTERVENTIONS:  1. Practice standing with minimal support.  2. Educate patient about standing tolerance.  3. Educate patient about independence with gait, transfers, and ADL's.  4. Educate patient about use of assistive device.  5. Educate patient about self-directed care.            Mobility       STG - Patient will ambulate 50 ft x 3 with appropriate device and supervision (Not Progressing)       Start:  03/04/24    Expected End:  03/18/24               Transfers       STG - Transfer from bed to chair with appropriate device IND (Not Progressing)       Start:  03/04/24    Expected End:  03/18/24            STG - Patient to transfer to and from sit to supine IND (Not Progressing)       Start:  03/04/24    Expected End:  03/18/24            STG - Patient will transfer sit to and from stand with appropriate device IND (Not Progressing)       Start:  03/04/24    Expected End:  03/18/24

## 2024-03-08 LAB
ALBUMIN SERPL BCP-MCNC: 3.6 G/DL (ref 3.4–5)
ANION GAP SERPL CALC-SCNC: 9 MMOL/L (ref 10–20)
BUN SERPL-MCNC: 13 MG/DL (ref 6–23)
CALCIUM SERPL-MCNC: 8.5 MG/DL (ref 8.6–10.3)
CHLORIDE SERPL-SCNC: 95 MMOL/L (ref 98–107)
CO2 SERPL-SCNC: 38 MMOL/L (ref 21–32)
CREAT SERPL-MCNC: 0.73 MG/DL (ref 0.5–1.3)
EGFRCR SERPLBLD CKD-EPI 2021: >90 ML/MIN/1.73M*2
ERYTHROCYTE [DISTWIDTH] IN BLOOD BY AUTOMATED COUNT: 12.3 % (ref 11.5–14.5)
GLUCOSE BLD MANUAL STRIP-MCNC: 119 MG/DL (ref 74–99)
GLUCOSE SERPL-MCNC: 97 MG/DL (ref 74–99)
HCT VFR BLD AUTO: 38.2 % (ref 41–52)
HGB BLD-MCNC: 12.1 G/DL (ref 13.5–17.5)
MAGNESIUM SERPL-MCNC: 1.9 MG/DL (ref 1.6–2.4)
MCH RBC QN AUTO: 28.9 PG (ref 26–34)
MCHC RBC AUTO-ENTMCNC: 31.7 G/DL (ref 32–36)
MCV RBC AUTO: 91 FL (ref 80–100)
NRBC BLD-RTO: 0 /100 WBCS (ref 0–0)
PHOSPHATE SERPL-MCNC: 3.6 MG/DL (ref 2.5–4.9)
PLATELET # BLD AUTO: 166 X10*3/UL (ref 150–450)
POTASSIUM SERPL-SCNC: 3.5 MMOL/L (ref 3.5–5.3)
RBC # BLD AUTO: 4.19 X10*6/UL (ref 4.5–5.9)
SODIUM SERPL-SCNC: 138 MMOL/L (ref 136–145)
WBC # BLD AUTO: 5.9 X10*3/UL (ref 4.4–11.3)

## 2024-03-08 PROCEDURE — 2500000001 HC RX 250 WO HCPCS SELF ADMINISTERED DRUGS (ALT 637 FOR MEDICARE OP): Performed by: FAMILY MEDICINE

## 2024-03-08 PROCEDURE — 99222 1ST HOSP IP/OBS MODERATE 55: CPT | Performed by: PSYCHIATRY & NEUROLOGY

## 2024-03-08 PROCEDURE — 2500000001 HC RX 250 WO HCPCS SELF ADMINISTERED DRUGS (ALT 637 FOR MEDICARE OP): Performed by: NURSE PRACTITIONER

## 2024-03-08 PROCEDURE — 2500000001 HC RX 250 WO HCPCS SELF ADMINISTERED DRUGS (ALT 637 FOR MEDICARE OP): Performed by: PSYCHIATRY & NEUROLOGY

## 2024-03-08 PROCEDURE — 94640 AIRWAY INHALATION TREATMENT: CPT

## 2024-03-08 PROCEDURE — 2500000004 HC RX 250 GENERAL PHARMACY W/ HCPCS (ALT 636 FOR OP/ED): Performed by: NURSE PRACTITIONER

## 2024-03-08 PROCEDURE — 94760 N-INVAS EAR/PLS OXIMETRY 1: CPT

## 2024-03-08 PROCEDURE — 2500000002 HC RX 250 W HCPCS SELF ADMINISTERED DRUGS (ALT 637 FOR MEDICARE OP, ALT 636 FOR OP/ED): Performed by: FAMILY MEDICINE

## 2024-03-08 PROCEDURE — 2500000002 HC RX 250 W HCPCS SELF ADMINISTERED DRUGS (ALT 637 FOR MEDICARE OP, ALT 636 FOR OP/ED): Performed by: INTERNAL MEDICINE

## 2024-03-08 PROCEDURE — 82947 ASSAY GLUCOSE BLOOD QUANT: CPT

## 2024-03-08 PROCEDURE — 83735 ASSAY OF MAGNESIUM: CPT | Performed by: INTERNAL MEDICINE

## 2024-03-08 PROCEDURE — S4991 NICOTINE PATCH NONLEGEND: HCPCS | Performed by: FAMILY MEDICINE

## 2024-03-08 PROCEDURE — 94664 DEMO&/EVAL PT USE INHALER: CPT

## 2024-03-08 PROCEDURE — 99232 SBSQ HOSP IP/OBS MODERATE 35: CPT | Performed by: INTERNAL MEDICINE

## 2024-03-08 PROCEDURE — 1200000002 HC GENERAL ROOM WITH TELEMETRY DAILY

## 2024-03-08 PROCEDURE — 80069 RENAL FUNCTION PANEL: CPT | Performed by: INTERNAL MEDICINE

## 2024-03-08 PROCEDURE — 2500000004 HC RX 250 GENERAL PHARMACY W/ HCPCS (ALT 636 FOR OP/ED): Performed by: FAMILY MEDICINE

## 2024-03-08 PROCEDURE — 85027 COMPLETE CBC AUTOMATED: CPT | Performed by: INTERNAL MEDICINE

## 2024-03-08 PROCEDURE — C9113 INJ PANTOPRAZOLE SODIUM, VIA: HCPCS | Performed by: NURSE PRACTITIONER

## 2024-03-08 PROCEDURE — 36415 COLL VENOUS BLD VENIPUNCTURE: CPT | Performed by: INTERNAL MEDICINE

## 2024-03-08 RX ORDER — PREDNISONE 10 MG/1
10 TABLET ORAL DAILY
Status: DISCONTINUED | OUTPATIENT
Start: 2024-03-09 | End: 2024-03-18 | Stop reason: HOSPADM

## 2024-03-08 RX ORDER — PANTOPRAZOLE SODIUM 40 MG/10ML
40 INJECTION, POWDER, LYOPHILIZED, FOR SOLUTION INTRAVENOUS DAILY
Status: DISCONTINUED | OUTPATIENT
Start: 2024-03-08 | End: 2024-03-18 | Stop reason: HOSPADM

## 2024-03-08 RX ADMIN — CARBIDOPA AND LEVODOPA 1 TABLET: 25; 100 TABLET ORAL at 22:04

## 2024-03-08 RX ADMIN — THIAMINE HCL TAB 100 MG 100 MG: 100 TAB at 08:46

## 2024-03-08 RX ADMIN — GABAPENTIN 300 MG: 300 CAPSULE ORAL at 22:05

## 2024-03-08 RX ADMIN — BUPRENORPHINE AND NALOXONE 1 TABLET: 8; 2 TABLET SUBLINGUAL at 23:06

## 2024-03-08 RX ADMIN — LORAZEPAM 1.5 MG: 1 TABLET ORAL at 22:06

## 2024-03-08 RX ADMIN — ALBUTEROL SULFATE 2.5 MG: 2.5 SOLUTION RESPIRATORY (INHALATION) at 21:28

## 2024-03-08 RX ADMIN — DOCUSATE SODIUM 100 MG: 100 CAPSULE, LIQUID FILLED ORAL at 08:46

## 2024-03-08 RX ADMIN — LORAZEPAM 1.5 MG: 1 TABLET ORAL at 15:24

## 2024-03-08 RX ADMIN — BUPRENORPHINE AND NALOXONE 1 TABLET: 8; 2 TABLET SUBLINGUAL at 09:03

## 2024-03-08 RX ADMIN — APIXABAN 10 MG: 5 TABLET, FILM COATED ORAL at 22:04

## 2024-03-08 RX ADMIN — ATORVASTATIN CALCIUM 80 MG: 80 TABLET, FILM COATED ORAL at 22:05

## 2024-03-08 RX ADMIN — CARBIDOPA AND LEVODOPA 1 TABLET: 25; 100 TABLET ORAL at 15:24

## 2024-03-08 RX ADMIN — GABAPENTIN 300 MG: 300 CAPSULE ORAL at 08:47

## 2024-03-08 RX ADMIN — CARBIDOPA AND LEVODOPA 1 TABLET: 25; 100 TABLET ORAL at 08:46

## 2024-03-08 RX ADMIN — POTASSIUM CHLORIDE 20 MEQ: 1.5 FOR SOLUTION ORAL at 08:47

## 2024-03-08 RX ADMIN — LORAZEPAM 1.5 MG: 1 TABLET ORAL at 09:07

## 2024-03-08 RX ADMIN — DOCUSATE SODIUM 100 MG: 100 CAPSULE, LIQUID FILLED ORAL at 22:07

## 2024-03-08 RX ADMIN — FOLIC ACID 1 MG: 1 TABLET ORAL at 08:47

## 2024-03-08 RX ADMIN — CARBIDOPA AND LEVODOPA 1 TABLET: 25; 100 TABLET ORAL at 01:47

## 2024-03-08 RX ADMIN — APIXABAN 10 MG: 5 TABLET, FILM COATED ORAL at 01:47

## 2024-03-08 RX ADMIN — NICOTINE 1 PATCH: 21 PATCH, EXTENDED RELEASE TRANSDERMAL at 08:44

## 2024-03-08 RX ADMIN — PANTOPRAZOLE SODIUM 40 MG: 40 INJECTION, POWDER, FOR SOLUTION INTRAVENOUS at 08:44

## 2024-03-08 RX ADMIN — APIXABAN 10 MG: 5 TABLET, FILM COATED ORAL at 08:46

## 2024-03-08 RX ADMIN — ESCITALOPRAM OXALATE 20 MG: 20 TABLET ORAL at 08:46

## 2024-03-08 RX ADMIN — PREDNISONE 20 MG: 20 TABLET ORAL at 08:46

## 2024-03-08 RX ADMIN — GABAPENTIN 300 MG: 300 CAPSULE ORAL at 15:24

## 2024-03-08 ASSESSMENT — PAIN SCALES - PAIN ASSESSMENT IN ADVANCED DEMENTIA (PAINAD)
BREATHING: NORMAL
CONSOLABILITY: NO NEED TO CONSOLE
FACIALEXPRESSION: SMILING OR INEXPRESSIVE
TOTALSCORE: 0
BODYLANGUAGE: RELAXED

## 2024-03-08 ASSESSMENT — COGNITIVE AND FUNCTIONAL STATUS - GENERAL
DRESSING REGULAR UPPER BODY CLOTHING: TOTAL
EATING MEALS: A LOT
TOILETING: TOTAL
STANDING UP FROM CHAIR USING ARMS: A LOT
DRESSING REGULAR LOWER BODY CLOTHING: TOTAL
CLIMB 3 TO 5 STEPS WITH RAILING: TOTAL
CLIMB 3 TO 5 STEPS WITH RAILING: TOTAL
EATING MEALS: TOTAL
DAILY ACTIVITIY SCORE: 7
MOVING TO AND FROM BED TO CHAIR: A LOT
TOILETING: TOTAL
MOVING FROM LYING ON BACK TO SITTING ON SIDE OF FLAT BED WITH BEDRAILS: A LITTLE
WALKING IN HOSPITAL ROOM: A LOT
HELP NEEDED FOR BATHING: TOTAL
TURNING FROM BACK TO SIDE WHILE IN FLAT BAD: A LITTLE
TURNING FROM BACK TO SIDE WHILE IN FLAT BAD: A LITTLE
MOVING TO AND FROM BED TO CHAIR: A LOT
MOVING FROM LYING ON BACK TO SITTING ON SIDE OF FLAT BED WITH BEDRAILS: A LITTLE
STANDING UP FROM CHAIR USING ARMS: A LOT
DRESSING REGULAR LOWER BODY CLOTHING: TOTAL
DAILY ACTIVITIY SCORE: 6
WALKING IN HOSPITAL ROOM: A LOT
MOBILITY SCORE: 13
DRESSING REGULAR UPPER BODY CLOTHING: TOTAL
PERSONAL GROOMING: TOTAL
PERSONAL GROOMING: TOTAL
MOBILITY SCORE: 13
HELP NEEDED FOR BATHING: TOTAL

## 2024-03-08 ASSESSMENT — ENCOUNTER SYMPTOMS
GASTROINTESTINAL NEGATIVE: 1
ENDOCRINE NEGATIVE: 1
MUSCULOSKELETAL NEGATIVE: 1
EYES NEGATIVE: 1
CONSTITUTIONAL NEGATIVE: 1
ALLERGIC/IMMUNOLOGIC NEGATIVE: 1

## 2024-03-08 ASSESSMENT — PAIN SCALES - GENERAL: PAINLEVEL_OUTOF10: 0 - NO PAIN

## 2024-03-08 NOTE — PROGRESS NOTES
"  Subjective    Patient is mildly lethargic but does wake up.  He does not have any acute complaints per se.    Objective    Vitals  Visit Vitals  /69 (BP Location: Left arm, Patient Position: Lying)   Pulse 84   Temp 36.1 °C (97 °F) (Temporal)   Resp 18   Ht 1.75 m (5' 8.9\")   Wt 80 kg (176 lb 5.9 oz)   SpO2 96%   BMI 26.12 kg/m²   Smoking Status Former   BSA 1.97 m²       Physical Exam   General: Patient is mildly lethargic but wakes up.  He does not have any acute complaints per se.  No acute distress.    HEENT: Clear sclera.  CVS: RRR.   Lungs: Mild diminished breath sounds.    Abdomen: Soft.  NT. +BS.   Extremities: No pitting edema bilat ankles.  Psychiatric: Appears mildly lethargic.        IOs    Intake/Output Summary (Last 24 hours) at 3/8/2024 1638  Last data filed at 3/8/2024 0900  Gross per 24 hour   Intake 505 ml   Output 225 ml   Net 280 ml       Labs:   Results from last 72 hours   Lab Units 03/08/24  0612 03/07/24  0850 03/06/24  0629   SODIUM mmol/L 138 138 139   POTASSIUM mmol/L 3.5 3.3* 3.5   CHLORIDE mmol/L 95* 95* 94*   CO2 mmol/L 38* 38* 40*   BUN mg/dL 13 13 11   CREATININE mg/dL 0.73 0.78 0.85   GLUCOSE mg/dL 97 89 77   CALCIUM mg/dL 8.5* 8.3* 8.7   ANION GAP mmol/L 9* 8* 9*   EGFR mL/min/1.73m*2 >90 >90 >90   PHOSPHORUS mg/dL 3.6 3.8 3.6      Results from last 72 hours   Lab Units 03/08/24  0612 03/07/24  0850 03/06/24  0629   WBC AUTO x10*3/uL 5.9 5.9 7.4   HEMOGLOBIN g/dL 12.1* 11.3* 12.7*   HEMATOCRIT % 38.2* 36.0* 40.3*   PLATELETS AUTO x10*3/uL 166 148* 168      Lab Results   Component Value Date    CALCIUM 8.5 (L) 03/08/2024    PHOS 3.6 03/08/2024      No results found for: \"CRP\"   [unfilled]       Images  Lower extremity venous duplex bilateral  Narrative: Interpreted By:  Alexx Ellsworth,   STUDY:  [facility] LOWER EXTREMITY VENOUS DUPLEX BILATERAL  3/4/2024 1:27 pm      INDICATION:  Elevated D-dimer.      COMPARISON:  03/02/2024.      ACCESSION NUMBER(S):  [id number]    "   ORDERING CLINICIAN:  ANTWON RIOS      TECHNIQUE:  Routine ultrasound of the  bilateral lower extremity was performed  with duplex Doppler (color and spectral) evaluation.  Static images  were obtained for remote interpretation.      FINDINGS:  RIGHT LEG:  The  right common femoral, femoral, popliteal, proximal medial  saphenous, and deep femoral veins are patent and free of thrombus.  The veins are normally compressible.  They demonstrate normal phasic  flow and augmentation response.      Visualized segments of the right calf posterior tibial and peroneal  vein segments appear patent and compressible without evidence of  thrombus.      LEFT LEG:  Study is positive for left lower extremity DVT. The left common  femoral vein is partially compressible containing nonocclusive  thrombus. The left femoral vein is noncompressible containing  thrombus, portions of which appear occlusive or near occlusive in the  mid-distal segments. The left popliteal vein is incompletely  compressible containing nonocclusive thrombus.      Thrombus is also evident within the left calf vein posterior tibial  vein and peroneal vein segments.      Impression: POSITIVE FOR LEFT LOWER EXTREMITY DVT. Thrombus demonstrated in the  left common femoral vein, femoral vein, and popliteal vein as well as  the posterior tibial and peroneal vein segments of the left calf.  Some areas of thrombus appear occlusive or near occlusive in these  regions.      No evidence of right lower extremity DVT.      MACRO:  Alexx Ellsworth discussed the significance and urgency of this  critical finding via secure chat with  ANTWON RIOS on 3/4/2024 at  4:21 pm.  (**-RCF-**) Findings:  See findings.      Signed by: Alexx Ellsworth 3/4/2024 4:22 PM  Dictation workstation:   [id number]  ECG 12 lead  Sinus rhythm with Premature atrial complexes  Left axis deviation  Septal infarct , age undetermined  Abnormal ECG  When compared with ECG of 02-MAY-2022  00:06,  Premature atrial complexes are now Present  Septal infarct is now Present      Meds  Scheduled medications  apixaban, 10 mg, oral, BID   Followed by  [START ON 3/11/2024] apixaban, 5 mg, oral, BID  atorvastatin, 80 mg, oral, Nightly  buprenorphine-naloxone, 1 tablet, sublingual, BID  carbidopa-levodopa, 1 tablet, oral, TID  docusate sodium, 100 mg, oral, BID  escitalopram, 20 mg, oral, Daily  fluticasone furoate-vilanteroL, 1 puff, inhalation, Daily  folic acid, 1 mg, oral, Daily  gabapentin, 300 mg, oral, TID  LORazepam, 1.5 mg, oral, q6h MASON  nicotine, 1 patch, transdermal, Daily  pantoprazole, 40 mg, intravenous, Daily  potassium chloride, 20 mEq, oral, Daily  potassium chloride CR, 20 mEq, oral, Once  [START ON 3/9/2024] predniSONE, 10 mg, oral, Daily  thiamine, 100 mg, oral, Daily  tiotropium, 2 puff, inhalation, Daily  [Held by provider] torsemide, 10 mg, oral, Daily  [Held by provider] traZODone, 100 mg, oral, Nightly      Continuous medications     PRN medications  PRN medications: albuterol, [Held by provider] busPIRone, oxygen, oxygen, QUEtiapine     Assessment and Plan    Chris Wu is a 68 y.o. male with pertinent hx of COPD, Parkinson's disorder, history of factor V Leiden mutation with DVT and PE in the past & appears to not be on anticoagulation currently and is s/p IVC filter, depression/anxiety & PTSD & diastolic CHF who was admitted to the hospital secondary to acute encephalopathy.     Altered mental status/encephalopathy  Likely due to  polypharmacy.  CTA of head and neck reveals no evidence of hemodynamically significant stenosis of the cervical vessels with mild arteriosclerotic narrowing of bilateral carotid bulbs and no evidence of significant stenosis of the large branch vessel cutoffs of the intracranial vessels  CT of the head showed no acute findings  CT of C-spine showed no acute findings  CT of chest/abdomen/pelvis w/ 6 mm right upper lobe nodule  CT of thoracic and lumbar  spine showed no evidence of injury  Pelvic x-ray showed pelvic ring intact and no acute fractures  MRI was canceled as patient initially could not answer questions however patient has significantly improved with medication estimates  Neurology evaluated the patient as well.  I spoke with patient's daughter, Orin, on the phone on 3/6, and updated her on patient's condition. She has been in contact with her father over the past 6 months, but had not been in contact with him for several years prior, and she states that she felt patient likely had some cognitive decline at least to some degree.  I discussed with her that I am speculating if patient has some underlying dementia, that may be undiagnosed, low with underlying psychiatric issues, along with recent medication changes causing his symptoms.    I reviewed records from [place] from office visit on 12/28/2023.  He has a diagnosis of generalized anxiety disorder which they were treating him for at [place].  -Psychiatry consulted and I discussed the case with Dr. Ramirez. Will try to taper down ativan by 0.5mg; so, will go down to 1.5 mg and see how pt responds. Will attempt to wean down on oral prednisone as well. Cont other meds as currently taking, as it seems more of these symptoms began after he started benzodiazepines approximately month ago as an outpatient.     Polypharmacy  In review of chart patient is on multiple sedative medications including Seroquel, oxazepam, Vistaril, gabapentin, Suboxone, baclofen, trazodone, Lexapro and BuSpar  The patient's cousin and friend who also cares for the patient reported patient's mentation has significantly worsened over the past month when his medications were adjusted however cannot specify which medication was new  Decreased home medication of baclofen from 10 mg 3 times daily to 5 mg 3 times daily  Cont home medication of Suboxone 8/2 1 tab sublingual BID  Blood pressure has been controlled, will continue  "to hold clonidine 0.1 mg twice daily  Continue escitalopram 20mg every day   Decreased gabapentin 600 mg 3 times daily to 300 mg 3 times daily  -Patient on Suboxone chronically as well.  -Continue Seroquel 25 mg QID PRN, which is a decrease from his home dose of 50 mg 4 times daily as needed  -Trazdoone is on hold.   -Patient apparently had been on oxazepam 30 mg 4 times daily.  Changed to Ativan 2 mg 4 times daily here.  As stated above, try to taper down on Ativan by 0.5 mg per dose, so we will place patient 1.5 mg every 6 hours at this time and see how he responds as stated above.  -Monitor.       Left lower extremity DVT on ultrasound 3/4/2024 with history of factor V Leiden mutation with prior DVT and PE in the past status post IVC filter placement in the past  -Continue Eliquis, which was started on 3/4/2024.     6 mm right upper lobe nodule  -Follow-up with pulmonology and primary care provider as an outpatient for further evaluation management.       Paroxysmal respiratory failure due to COPD  Patient requires oxygen at 3 L a minute at baseline  Continue LABA  -As it appears patient has been on prednisone recently, unclear if he has been on it long enough as an outpatient to cause adrenal suppression.  So, we will taper down to 10 mg daily, as he is currently on 20 mg daily.    -Monitor.    Hypokalemia  -Replaced.   -Monitor.     Inability to care for self  PT OT and social work consulted     Parkinson disease  Continue carbidopa levodopa     Depression ADD/PTSD  Lexapro continue  Holding BuSpar, trazodone due to concerns for polypharmacy     GERD  Continue PPI  DVT prophylaxis Lovenox and SCDs     Per Dr. Wilcox's note: \"I met with the patient's cousin who called 911 as well as his caretaker/friend today  They both reported a significant decrease in his mentation over the past month after a medication change but could not dictate the medication change  The patient's friend who is very involved in his " "care reports he feels the patient would be safe to return home if his mental status returns to that what it was a month ago\"  PT and OT have evaluated and recommend placement in a skilled nursing facility  Patient is agreeable to placement in a skilled nursing facility, but will need family member or POA to help make the decision for him due to his confusion.                         "

## 2024-03-08 NOTE — PROGRESS NOTES
"  Subjective    Patient is mildly lethargic and does open his eyes but does not really complain of anything particularly.    Objective    Vitals  Visit Vitals  /73 (BP Location: Left arm, Patient Position: Sitting)   Pulse 100   Temp 36.6 °C (97.9 °F) (Temporal)   Resp 18   Ht 1.75 m (5' 8.9\")   Wt 80 kg (176 lb 5.9 oz)   SpO2 95%   BMI 26.12 kg/m²   Smoking Status Former   BSA 1.97 m²       Physical Exam   General: Patient is lethargic but does wake up but does not really answer questions.  No acute distress.  HEENT: Clear sclera.  CVS: RRR.   Lungs: Mild diminished breath sounds.    Abdomen: Soft.  Nontender.  Bowel sounds present.    Extremities: No pitting edema bilateral ankles.  Psychiatric: Lethargic.     IOs    Intake/Output Summary (Last 24 hours) at 3/7/2024 1915  Last data filed at 3/7/2024 1233  Gross per 24 hour   Intake 240 ml   Output 300 ml   Net -60 ml       Labs:   Results from last 72 hours   Lab Units 03/07/24  0850 03/06/24  0629 03/05/24  0712   SODIUM mmol/L 138 139 135*   POTASSIUM mmol/L 3.3* 3.5 4.0   CHLORIDE mmol/L 95* 94* 95*   CO2 mmol/L 38* 40* 33*   BUN mg/dL 13 11 10   CREATININE mg/dL 0.78 0.85 0.67   GLUCOSE mg/dL 89 77 86   CALCIUM mg/dL 8.3* 8.7 8.3*   ANION GAP mmol/L 8* 9* 11   EGFR mL/min/1.73m*2 >90 >90 >90   PHOSPHORUS mg/dL 3.8 3.6 3.1      Results from last 72 hours   Lab Units 03/07/24  0850 03/06/24  0629 03/05/24  0712   WBC AUTO x10*3/uL 5.9 7.4 6.9   HEMOGLOBIN g/dL 11.3* 12.7* 12.2*   HEMATOCRIT % 36.0* 40.3* 39.7*   PLATELETS AUTO x10*3/uL 148* 168 145*      Lab Results   Component Value Date    CALCIUM 8.3 (L) 03/07/2024    PHOS 3.8 03/07/2024      No results found for: \"CRP\"   [unfilled]       Images  Lower extremity venous duplex bilateral  Narrative: Interpreted By:  Alexx Ellsworth,   STUDY:  [facility] LOWER EXTREMITY VENOUS DUPLEX BILATERAL  3/4/2024 1:27 pm      INDICATION:  Elevated D-dimer.      COMPARISON:  03/02/2024.      ACCESSION " NUMBER(S):  [id number]      ORDERING CLINICIAN:  ANTWON RIOS      TECHNIQUE:  Routine ultrasound of the  bilateral lower extremity was performed  with duplex Doppler (color and spectral) evaluation.  Static images  were obtained for remote interpretation.      FINDINGS:  RIGHT LEG:  The  right common femoral, femoral, popliteal, proximal medial  saphenous, and deep femoral veins are patent and free of thrombus.  The veins are normally compressible.  They demonstrate normal phasic  flow and augmentation response.      Visualized segments of the right calf posterior tibial and peroneal  vein segments appear patent and compressible without evidence of  thrombus.      LEFT LEG:  Study is positive for left lower extremity DVT. The left common  femoral vein is partially compressible containing nonocclusive  thrombus. The left femoral vein is noncompressible containing  thrombus, portions of which appear occlusive or near occlusive in the  mid-distal segments. The left popliteal vein is incompletely  compressible containing nonocclusive thrombus.      Thrombus is also evident within the left calf vein posterior tibial  vein and peroneal vein segments.      Impression: POSITIVE FOR LEFT LOWER EXTREMITY DVT. Thrombus demonstrated in the  left common femoral vein, femoral vein, and popliteal vein as well as  the posterior tibial and peroneal vein segments of the left calf.  Some areas of thrombus appear occlusive or near occlusive in these  regions.      No evidence of right lower extremity DVT.      MACRO:  Alexx Ellsworth discussed the significance and urgency of this  critical finding via secure chat with  ANTWON RIOS on 3/4/2024 at  4:21 pm.  (**-RCF-**) Findings:  See findings.      Signed by: Alexx Ellsworth 3/4/2024 4:22 PM  Dictation workstation:   [id number]  ECG 12 lead  Sinus rhythm with Premature atrial complexes  Left axis deviation  Septal infarct , age undetermined  Abnormal ECG  When compared  with ECG of 02-MAY-2022 00:06,  Premature atrial complexes are now Present  Septal infarct is now Present      Meds  Scheduled medications  apixaban, 10 mg, oral, BID   Followed by  [START ON 3/11/2024] apixaban, 5 mg, oral, BID  atorvastatin, 80 mg, oral, Nightly  buprenorphine-naloxone, 1 tablet, sublingual, BID  carbidopa-levodopa, 1 tablet, oral, TID  docusate sodium, 100 mg, oral, BID  escitalopram, 20 mg, oral, Daily  fluticasone furoate-vilanteroL, 1 puff, inhalation, Daily  folic acid, 1 mg, oral, Daily  gabapentin, 300 mg, oral, TID  LORazepam, 2 mg, oral, q6h MASON  nicotine, 1 patch, transdermal, Daily  pantoprazole, 40 mg, oral, Daily before breakfast  potassium chloride, 20 mEq, oral, Daily  predniSONE, 20 mg, oral, Daily  thiamine, 100 mg, oral, Daily  tiotropium, 2 puff, inhalation, Daily  [Held by provider] torsemide, 10 mg, oral, Daily  traZODone, 100 mg, oral, Nightly      Continuous medications     PRN medications  PRN medications: albuterol, [Held by provider] busPIRone, oxygen, oxygen, QUEtiapine     Assessment and Plan    Chris Wu is a 68 y.o. male with pertinent hx of COPD, Parkinson's disorder, history of factor V Leiden mutation with DVT and PE in the past & appears to not be on anticoagulation currently and is s/p IVC filter, depression/anxiety & PTSD & diastolic CHF who was admitted to the hospital secondary to acute encephalopathy.     Altered mental status/encephalopathy  Likely due to  polypharmacy.  CTA of head and neck reveals no evidence of hemodynamically significant stenosis of the cervical vessels with mild arteriosclerotic narrowing of bilateral carotid bulbs and no evidence of significant stenosis of the large branch vessel cutoffs of the intracranial vessels  CT of the head showed no acute findings  CT of C-spine showed no acute findings  CT of chest/abdomen/pelvis w/ 6 mm right upper lobe nodule  CT of thoracic and lumbar spine showed no evidence of injury  Pelvic x-ray  showed pelvic ring intact and no acute fractures  MRI was canceled as patient initially could not answer questions however patient has significantly improved with medication estimates  Neurology evaluated the patient as well.  I spoke with patient's daughter, Orin, on the phone on 3/6, and updated her on patient's condition. She has been in contact with her father over the past 6 months, but had not been in contact with him for several years prior, and she states that she felt patient likely had some cognitive decline at least to some degree.  I discussed with her that I am speculating if patient has some underlying dementia, that may be undiagnosed, low with underlying psychiatric issues, along with recent medication changes causing his symptoms.    I reviewed records from [place] from office visit on 12/28/2023.  He has a diagnosis of generalized anxiety disorder which they were treating him for at [place].  -Will consult psychiatry to review pt's meds and see if they have recommendations.        Polypharmacy  In review of chart patient is on multiple sedative medications including Seroquel, oxazepam, Vistaril, gabapentin, Suboxone, baclofen, trazodone, Lexapro and BuSpar  The patient's cousin and friend who also cares for the patient reported patient's mentation has significantly worsened over the past month when his medications were adjusted however cannot specify which medication was new  Decreased home medication of baclofen from 10 mg 3 times daily to 5 mg 3 times daily  Have restarted home medication of Suboxone 8/2 1 tab sublingual BID  Blood pressure has been controlled, will continue to hold clonidine 0.1 mg twice daily  Continue escitalopram 20mg every day   Decreased gabapentin 600 mg 3 times daily to 300 mg 3 times daily  With history of oxazepam 30 mg 4 times daily will start Ativan 2mg QID (Oxazepam 30mg equvalent to Ativan 3 mg)  Continue Seroquel 25 mg QID PRN, which is a decrease from his  "home dose of 50 mg 4 times daily as needed  Continue trazodone 100 mg nightly which has been recently decreased from 150 mg nightly     PCP reported psychiatrist will be continuing Suboxone further recommended a decrease of Ativan 1 mg twice daily  Oxazepam 30 mg 4 times daily prior to admission which is a likely the cause of this admission   If Ativan is weaned to 1 mg twice daily patient may withdraw  Will continue current decreased dose of Ativan and defer further titration to psychiatry as an outpatient     Left lower extremity DVT on ultrasound 3/4/2024 with history of factor V Leiden mutation with prior DVT and PE in the past status post IVC filter placement in the past  -Continue Eliquis, which was started on 3/4/2024.     6 mm right upper lobe nodule  -Follow-up with pulmonology and primary care provider as an outpatient for further evaluation management.       Paroxysmal respiratory failure due to COPD  Patient requires oxygen at 3 L a minute at baseline  Continue LABA  Will discontinue prednisone.     Hypokalemia  -Replace and monitor.     Inability to care for self  PT OT and social work consulted     Parkinson disease  Continue carbidopa levodopa     Depression ADD/PTSD  Lexapro continue  Holding g BuSpar, trazodone and Seroquel due to concerns for polypharmacy     GERD  Continue PPI  DVT prophylaxis Lovenox and SCDs     Per Dr. Wilcox's note: \"I met with the patient's cousin who called 911 as well as his caretaker/friend today  They both reported a significant decrease in his mentation over the past month after a medication change but could not dictate the medication change  The patient's friend who is very involved in his care reports he feels the patient would be safe to return home if his mental status returns to that what it was a month ago\"  PT and OT have evaluated and recommend placement in a skilled nursing facility  Patient is agreeable to placement in a skilled nursing facility, but will " need family member or POA to help make the decision for him due to his confusion.

## 2024-03-08 NOTE — SIGNIFICANT EVENT
Pt barely able to take po pills d/t being sleepy, but arousable to vigorous repeated stimuli. Suggest holding gabapentin and ativan for now until more awake. Nursing unable to give suboxone.

## 2024-03-08 NOTE — CARE PLAN
The patient's goals for the shift include      The clinical goals for the shift include Patient will maintain appropriate behavior throughout shift    Over the shift, the patient slept through out the night. Patient was difficult to keep awake to take medications throughout the night. Patient was able to get important meds down but unable to take any other meds throughout the night.

## 2024-03-08 NOTE — CARE PLAN
The patient's goals for the shift include      The clinical goals for the shift include pt will remain hds throughout shift    Pt afebrile, VSS, pt confused     Sarcoidosis

## 2024-03-08 NOTE — PROGRESS NOTES
03/08/24 1558   Discharge Planning   Patient expects to be discharged to: [facility], provided to attending.  Pt remains AMS; awaiting psych eval when pt stable.
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
## 2024-03-09 LAB
ALBUMIN SERPL BCP-MCNC: 3.3 G/DL (ref 3.4–5)
ANION GAP SERPL CALC-SCNC: 9 MMOL/L (ref 10–20)
BUN SERPL-MCNC: 12 MG/DL (ref 6–23)
CALCIUM SERPL-MCNC: 8.2 MG/DL (ref 8.6–10.3)
CHLORIDE SERPL-SCNC: 94 MMOL/L (ref 98–107)
CO2 SERPL-SCNC: 37 MMOL/L (ref 21–32)
CREAT SERPL-MCNC: 0.67 MG/DL (ref 0.5–1.3)
EGFRCR SERPLBLD CKD-EPI 2021: >90 ML/MIN/1.73M*2
ERYTHROCYTE [DISTWIDTH] IN BLOOD BY AUTOMATED COUNT: 12.3 % (ref 11.5–14.5)
GLUCOSE SERPL-MCNC: 86 MG/DL (ref 74–99)
HCT VFR BLD AUTO: 36.5 % (ref 41–52)
HGB BLD-MCNC: 11.3 G/DL (ref 13.5–17.5)
MAGNESIUM SERPL-MCNC: 1.85 MG/DL (ref 1.6–2.4)
MCH RBC QN AUTO: 28.3 PG (ref 26–34)
MCHC RBC AUTO-ENTMCNC: 31 G/DL (ref 32–36)
MCV RBC AUTO: 91 FL (ref 80–100)
NRBC BLD-RTO: 0 /100 WBCS (ref 0–0)
PHOSPHATE SERPL-MCNC: 3.7 MG/DL (ref 2.5–4.9)
PLATELET # BLD AUTO: 155 X10*3/UL (ref 150–450)
POTASSIUM SERPL-SCNC: 3.5 MMOL/L (ref 3.5–5.3)
RBC # BLD AUTO: 4 X10*6/UL (ref 4.5–5.9)
SODIUM SERPL-SCNC: 136 MMOL/L (ref 136–145)
WBC # BLD AUTO: 6.6 X10*3/UL (ref 4.4–11.3)

## 2024-03-09 PROCEDURE — 1200000002 HC GENERAL ROOM WITH TELEMETRY DAILY

## 2024-03-09 PROCEDURE — 2500000001 HC RX 250 WO HCPCS SELF ADMINISTERED DRUGS (ALT 637 FOR MEDICARE OP): Performed by: NURSE PRACTITIONER

## 2024-03-09 PROCEDURE — 83735 ASSAY OF MAGNESIUM: CPT | Performed by: INTERNAL MEDICINE

## 2024-03-09 PROCEDURE — C9113 INJ PANTOPRAZOLE SODIUM, VIA: HCPCS | Performed by: NURSE PRACTITIONER

## 2024-03-09 PROCEDURE — 99232 SBSQ HOSP IP/OBS MODERATE 35: CPT | Performed by: INTERNAL MEDICINE

## 2024-03-09 PROCEDURE — 2500000001 HC RX 250 WO HCPCS SELF ADMINISTERED DRUGS (ALT 637 FOR MEDICARE OP): Performed by: INTERNAL MEDICINE

## 2024-03-09 PROCEDURE — 84100 ASSAY OF PHOSPHORUS: CPT | Performed by: INTERNAL MEDICINE

## 2024-03-09 PROCEDURE — 2500000002 HC RX 250 W HCPCS SELF ADMINISTERED DRUGS (ALT 637 FOR MEDICARE OP, ALT 636 FOR OP/ED): Performed by: INTERNAL MEDICINE

## 2024-03-09 PROCEDURE — 2500000002 HC RX 250 W HCPCS SELF ADMINISTERED DRUGS (ALT 637 FOR MEDICARE OP, ALT 636 FOR OP/ED): Performed by: FAMILY MEDICINE

## 2024-03-09 PROCEDURE — 85027 COMPLETE CBC AUTOMATED: CPT | Performed by: INTERNAL MEDICINE

## 2024-03-09 PROCEDURE — S4991 NICOTINE PATCH NONLEGEND: HCPCS | Performed by: FAMILY MEDICINE

## 2024-03-09 PROCEDURE — 2500000004 HC RX 250 GENERAL PHARMACY W/ HCPCS (ALT 636 FOR OP/ED): Performed by: NURSE PRACTITIONER

## 2024-03-09 PROCEDURE — 36415 COLL VENOUS BLD VENIPUNCTURE: CPT | Performed by: INTERNAL MEDICINE

## 2024-03-09 PROCEDURE — 2500000001 HC RX 250 WO HCPCS SELF ADMINISTERED DRUGS (ALT 637 FOR MEDICARE OP): Performed by: FAMILY MEDICINE

## 2024-03-09 PROCEDURE — 2500000004 HC RX 250 GENERAL PHARMACY W/ HCPCS (ALT 636 FOR OP/ED): Performed by: INTERNAL MEDICINE

## 2024-03-09 RX ORDER — LORAZEPAM 1 MG/1
1 TABLET ORAL EVERY 6 HOURS SCHEDULED
Status: DISCONTINUED | OUTPATIENT
Start: 2024-03-09 | End: 2024-03-11

## 2024-03-09 RX ADMIN — CARBIDOPA AND LEVODOPA 1 TABLET: 25; 100 TABLET ORAL at 14:59

## 2024-03-09 RX ADMIN — CARBIDOPA AND LEVODOPA 1 TABLET: 25; 100 TABLET ORAL at 22:07

## 2024-03-09 RX ADMIN — LORAZEPAM 1 MG: 1 TABLET ORAL at 22:07

## 2024-03-09 RX ADMIN — ESCITALOPRAM OXALATE 20 MG: 20 TABLET ORAL at 09:55

## 2024-03-09 RX ADMIN — POTASSIUM CHLORIDE 20 MEQ: 1.5 FOR SOLUTION ORAL at 09:55

## 2024-03-09 RX ADMIN — PREDNISONE 10 MG: 10 TABLET ORAL at 09:55

## 2024-03-09 RX ADMIN — FOLIC ACID 1 MG: 1 TABLET ORAL at 09:55

## 2024-03-09 RX ADMIN — APIXABAN 10 MG: 5 TABLET, FILM COATED ORAL at 22:07

## 2024-03-09 RX ADMIN — THIAMINE HCL TAB 100 MG 100 MG: 100 TAB at 09:55

## 2024-03-09 RX ADMIN — LORAZEPAM 1 MG: 1 TABLET ORAL at 15:00

## 2024-03-09 RX ADMIN — CARBIDOPA AND LEVODOPA 1 TABLET: 25; 100 TABLET ORAL at 09:54

## 2024-03-09 RX ADMIN — LORAZEPAM 1 MG: 1 TABLET ORAL at 09:55

## 2024-03-09 RX ADMIN — FLUTICASONE FUROATE AND VILANTEROL TRIFENATATE 1 PUFF: 200; 25 POWDER RESPIRATORY (INHALATION) at 09:50

## 2024-03-09 RX ADMIN — APIXABAN 10 MG: 5 TABLET, FILM COATED ORAL at 09:54

## 2024-03-09 RX ADMIN — GABAPENTIN 300 MG: 300 CAPSULE ORAL at 09:55

## 2024-03-09 RX ADMIN — PANTOPRAZOLE SODIUM 40 MG: 40 INJECTION, POWDER, FOR SOLUTION INTRAVENOUS at 09:55

## 2024-03-09 RX ADMIN — BUPRENORPHINE AND NALOXONE 1 TABLET: 8; 2 TABLET SUBLINGUAL at 22:08

## 2024-03-09 RX ADMIN — NICOTINE 1 PATCH: 21 PATCH, EXTENDED RELEASE TRANSDERMAL at 09:51

## 2024-03-09 RX ADMIN — TIOTROPIUM BROMIDE INHALATION SPRAY 2 PUFF: 3.12 SPRAY, METERED RESPIRATORY (INHALATION) at 09:49

## 2024-03-09 RX ADMIN — GABAPENTIN 300 MG: 300 CAPSULE ORAL at 22:07

## 2024-03-09 RX ADMIN — GABAPENTIN 300 MG: 300 CAPSULE ORAL at 15:00

## 2024-03-09 RX ADMIN — BUPRENORPHINE AND NALOXONE 1 TABLET: 8; 2 TABLET SUBLINGUAL at 10:20

## 2024-03-09 RX ADMIN — DOCUSATE SODIUM 100 MG: 100 CAPSULE, LIQUID FILLED ORAL at 09:55

## 2024-03-09 ASSESSMENT — COGNITIVE AND FUNCTIONAL STATUS - GENERAL
EATING MEALS: TOTAL
HELP NEEDED FOR BATHING: TOTAL
MOVING TO AND FROM BED TO CHAIR: A LOT
MOVING FROM LYING ON BACK TO SITTING ON SIDE OF FLAT BED WITH BEDRAILS: A LITTLE
WALKING IN HOSPITAL ROOM: A LOT
STANDING UP FROM CHAIR USING ARMS: A LOT
PERSONAL GROOMING: TOTAL
DRESSING REGULAR LOWER BODY CLOTHING: TOTAL
DRESSING REGULAR UPPER BODY CLOTHING: TOTAL
CLIMB 3 TO 5 STEPS WITH RAILING: TOTAL
MOBILITY SCORE: 13
TURNING FROM BACK TO SIDE WHILE IN FLAT BAD: A LITTLE

## 2024-03-09 ASSESSMENT — PAIN SCALES - PAIN ASSESSMENT IN ADVANCED DEMENTIA (PAINAD)
BODYLANGUAGE: RELAXED
BREATHING: NORMAL
TOTALSCORE: 0
FACIALEXPRESSION: SMILING OR INEXPRESSIVE
CONSOLABILITY: NO NEED TO CONSOLE

## 2024-03-09 ASSESSMENT — PAIN - FUNCTIONAL ASSESSMENT: PAIN_FUNCTIONAL_ASSESSMENT: 0-10

## 2024-03-09 ASSESSMENT — PAIN SCALES - GENERAL
PAINLEVEL_OUTOF10: 0 - NO PAIN
PAINLEVEL_OUTOF10: 3
PAINLEVEL_OUTOF10: 0 - NO PAIN

## 2024-03-09 NOTE — CARE PLAN
The clinical goals for the shift include pt will remain safe throughout this shift. Pt has a sitter at watch during this entire shift. Pt lethargic and difficult to wake. Responds to pain and sometimes verbal response. Wounds behind bilateral ears LIDIA. Shift goal met.

## 2024-03-09 NOTE — CARE PLAN
The patient's goals for the shift include      The clinical goals for the shift include Pt will remain safe throughout shift.      Problem: Respiratory  Goal: Clear secretions with interventions this shift  Outcome: Progressing  Goal: Minimize anxiety/maximize coping throughout shift  Outcome: Progressing  Goal: Minimal/no exertional discomfort or dyspnea this shift  Outcome: Progressing  Goal: No signs of respiratory distress (eg. Use of accessory muscles. Peds grunting)  Outcome: Progressing  Goal: Patent airway maintained this shift  Outcome: Progressing  Goal: Tolerate mechanical ventilation evidenced by VS/agitation level this shift  Outcome: Progressing  Goal: Tolerate pulmonary toileting this shift  Outcome: Progressing  Goal: Verbalize decreased shortness of breath this shift  Outcome: Progressing  Goal: Wean oxygen to maintain O2 saturation per order/standard this shift  Outcome: Progressing  Goal: Increase self care and/or family involvement in next 24 hours  Outcome: Progressing     Problem: Skin  Goal: Decreased wound size/increased tissue granulation at next dressing change  Outcome: Progressing  Goal: Participates in plan/prevention/treatment measures  Outcome: Progressing  Goal: Prevent/manage excess moisture  Outcome: Progressing  Goal: Prevent/minimize sheer/friction injuries  Outcome: Progressing  Goal: Promote/optimize nutrition  Outcome: Progressing  Goal: Promote skin healing  Outcome: Progressing     Problem: Pain  Goal: My pain/discomfort is manageable  Outcome: Progressing     Problem: Safety  Goal: Patient will be injury free during hospitalization  Outcome: Progressing  Goal: I will remain free of falls  Outcome: Progressing

## 2024-03-09 NOTE — PROGRESS NOTES
"  Subjective    Patient denies chest pain, shortness of breath, nausea, vomiting or diarrhea.    Objective    Vitals  Visit Vitals  /72 (Patient Position: Lying)   Pulse 106   Temp 35.8 °C (96.4 °F) (Temporal)   Resp 18   Ht 1.75 m (5' 8.9\")   Wt 80 kg (176 lb 5.9 oz)   SpO2 93%   BMI 26.12 kg/m²   Smoking Status Former   BSA 1.97 m²       Physical Exam   General: Alert and oriented to self, birthday, president and year..  No acute distress.  HEENT: Clear sclera.  CVS: RRR.   Lungs: Mild diminished breath sounds.  No wheezes.    Abdomen: Soft.  Nontender.  Bowel sounds present.  Extremities: No pitting edema bilateral ankles.  Psychiatric: Cooperative.     IOs    Intake/Output Summary (Last 24 hours) at 3/9/2024 1737  Last data filed at 3/9/2024 1700  Gross per 24 hour   Intake 1174 ml   Output 700 ml   Net 474 ml       Labs:   Results from last 72 hours   Lab Units 03/09/24  0656 03/08/24  0612 03/07/24  0850   SODIUM mmol/L 136 138 138   POTASSIUM mmol/L 3.5 3.5 3.3*   CHLORIDE mmol/L 94* 95* 95*   CO2 mmol/L 37* 38* 38*   BUN mg/dL 12 13 13   CREATININE mg/dL 0.67 0.73 0.78   GLUCOSE mg/dL 86 97 89   CALCIUM mg/dL 8.2* 8.5* 8.3*   ANION GAP mmol/L 9* 9* 8*   EGFR mL/min/1.73m*2 >90 >90 >90   PHOSPHORUS mg/dL 3.7 3.6 3.8      Results from last 72 hours   Lab Units 03/09/24  0656 03/08/24  0612 03/07/24  0850   WBC AUTO x10*3/uL 6.6 5.9 5.9   HEMOGLOBIN g/dL 11.3* 12.1* 11.3*   HEMATOCRIT % 36.5* 38.2* 36.0*   PLATELETS AUTO x10*3/uL 155 166 148*      Lab Results   Component Value Date    CALCIUM 8.2 (L) 03/09/2024    PHOS 3.7 03/09/2024      No results found for: \"CRP\"   [unfilled]       Images  Lower extremity venous duplex bilateral  Narrative: Interpreted By:  Alexx Ellsworth,   STUDY:  [facility] LOWER EXTREMITY VENOUS DUPLEX BILATERAL  3/4/2024 1:27 pm      INDICATION:  Elevated D-dimer.      COMPARISON:  03/02/2024.      ACCESSION NUMBER(S):  [id number]      ORDERING CLINICIAN:  ANTWON RIOS   "    TECHNIQUE:  Routine ultrasound of the  bilateral lower extremity was performed  with duplex Doppler (color and spectral) evaluation.  Static images  were obtained for remote interpretation.      FINDINGS:  RIGHT LEG:  The  right common femoral, femoral, popliteal, proximal medial  saphenous, and deep femoral veins are patent and free of thrombus.  The veins are normally compressible.  They demonstrate normal phasic  flow and augmentation response.      Visualized segments of the right calf posterior tibial and peroneal  vein segments appear patent and compressible without evidence of  thrombus.      LEFT LEG:  Study is positive for left lower extremity DVT. The left common  femoral vein is partially compressible containing nonocclusive  thrombus. The left femoral vein is noncompressible containing  thrombus, portions of which appear occlusive or near occlusive in the  mid-distal segments. The left popliteal vein is incompletely  compressible containing nonocclusive thrombus.      Thrombus is also evident within the left calf vein posterior tibial  vein and peroneal vein segments.      Impression: POSITIVE FOR LEFT LOWER EXTREMITY DVT. Thrombus demonstrated in the  left common femoral vein, femoral vein, and popliteal vein as well as  the posterior tibial and peroneal vein segments of the left calf.  Some areas of thrombus appear occlusive or near occlusive in these  regions.      No evidence of right lower extremity DVT.      MACRO:  Alexx Ellsworth discussed the significance and urgency of this  critical finding via secure chat with  ANTWON RIOS on 3/4/2024 at  4:21 pm.  (**-F-**) Findings:  See findings.      Signed by: Alexx Ellsworth 3/4/2024 4:22 PM  Dictation workstation:   [id number]  ECG 12 lead  Sinus rhythm with Premature atrial complexes  Left axis deviation  Septal infarct , age undetermined  Abnormal ECG  When compared with ECG of 02-MAY-2022 00:06,  Premature atrial complexes are now  Present  Septal infarct is now Present      Meds  Scheduled medications  apixaban, 10 mg, oral, BID   Followed by  [START ON 3/11/2024] apixaban, 5 mg, oral, BID  atorvastatin, 80 mg, oral, Nightly  buprenorphine-naloxone, 1 tablet, sublingual, BID  carbidopa-levodopa, 1 tablet, oral, TID  docusate sodium, 100 mg, oral, BID  escitalopram, 20 mg, oral, Daily  fluticasone furoate-vilanteroL, 1 puff, inhalation, Daily  folic acid, 1 mg, oral, Daily  gabapentin, 300 mg, oral, TID  LORazepam, 1 mg, oral, q6h MASON  nicotine, 1 patch, transdermal, Daily  pantoprazole, 40 mg, intravenous, Daily  potassium chloride, 20 mEq, oral, Daily  potassium chloride CR, 20 mEq, oral, Once  predniSONE, 10 mg, oral, Daily  thiamine, 100 mg, oral, Daily  tiotropium, 2 puff, inhalation, Daily  [Held by provider] torsemide, 10 mg, oral, Daily  [Held by provider] traZODone, 100 mg, oral, Nightly      Continuous medications     PRN medications  PRN medications: albuterol, [Held by provider] busPIRone, oxygen, oxygen, QUEtiapine     Assessment and Plan    Chris Wu is a 68 y.o. male with pertinent hx of COPD, Parkinson's disorder, history of factor V Leiden mutation with DVT and PE in the past & appears to not be on anticoagulation currently and is s/p IVC filter, depression/anxiety & PTSD & diastolic CHF who was admitted to the hospital secondary to acute encephalopathy.     Altered mental status/encephalopathy  Likely due to  polypharmacy.  CTA of head and neck reveals no evidence of hemodynamically significant stenosis of the cervical vessels with mild arteriosclerotic narrowing of bilateral carotid bulbs and no evidence of significant stenosis of the large branch vessel cutoffs of the intracranial vessels  CT of the head showed no acute findings  CT of C-spine showed no acute findings  CT of chest/abdomen/pelvis w/ 6 mm right upper lobe nodule  CT of thoracic and lumbar spine showed no evidence of injury  Pelvic x-ray showed pelvic  ring intact and no acute fractures  MRI was canceled as patient initially could not answer questions however patient has significantly improved with medication estimates  Neurology evaluated the patient as well.  I spoke with patient's daughter, Orin, on the phone on 3/6, and updated her on patient's condition. She has been in contact with her father over the past 6 months, but had not been in contact with him for several years prior, and she states that she felt patient likely had some cognitive decline at least to some degree.  I discussed with her that I am speculating if patient has some underlying dementia, that may be undiagnosed, low with underlying psychiatric issues, along with recent medication changes causing his symptoms.    I reviewed records from [place] [facility] from office visit on 12/28/2023.  He has a diagnosis of generalized anxiety disorder which they were treating him for at [place] [facility].  -Psychiatry consulted and I discussed the case with Dr. Ramirez on 3/8. Will try to taper down ativan by 0.5mg.   -Today, patient is alert and oriented to self, birthday, president and year.  We will decrease Ativan from 1.5 mg every 6 hours to 1 mg every 6 hours.  We are decreasing prednisone from 20 mg daily to 10 mg daily.  -Continue to monitor.     Polypharmacy  In review of chart patient is on multiple sedative medications including Seroquel, oxazepam, Vistaril, gabapentin, Suboxone, baclofen, trazodone, Lexapro and BuSpar  The patient's cousin and friend who also cares for the patient reported patient's mentation has significantly worsened over the past month when his medications were adjusted however cannot specify which medication was new  Decreased home medication of baclofen from 10 mg 3 times daily to 5 mg 3 times daily  Cont home medication of Suboxone 8/2 1 tab sublingual BID  Blood pressure has been controlled, will continue to hold clonidine 0.1 mg twice daily  Continue escitalopram 20mg every day  "  Decreased gabapentin 600 mg 3 times daily to 300 mg 3 times daily  -Patient on Suboxone chronically as well.  -Continue Seroquel 25 mg QID PRN, which is a decrease from his home dose of 50 mg 4 times daily as needed  -Trazdoone is on hold.   -Patient apparently had been on oxazepam 30 mg 4 times daily.  Changed to Ativan 2 mg 4 times daily here.  As stated above, taper down on Ativan by 0.5 mg per dose, so we will decrease to 1 mg every 6 hours today, 3/9.    -Monitor.       Left lower extremity DVT on ultrasound 3/4/2024 with history of factor V Leiden mutation with prior DVT and PE in the past status post IVC filter placement in the past  -Continue Eliquis, which was started on 3/4/2024.     6 mm right upper lobe nodule  -Follow-up with pulmonology and primary care provider as an outpatient for further evaluation management.       Paroxysmal respiratory failure due to COPD  Patient requires oxygen at 3 L a minute at baseline  Continue LABA  -As it appears patient has been on prednisone recently, unclear if he has been on it long enough as an outpatient to cause adrenal suppression.  So, we will taper down to 10 mg daily, as he was on 20 mg daily.    -Monitor.     Hypokalemia  -Replaced.   -Monitor.     Inability to care for self  PT OT and social work consulted     Parkinson disease  Continue carbidopa levodopa     Depression ADD/PTSD  Lexapro continue  Holding BuSpar, trazodone due to concerns for polypharmacy     GERD  Continue PPI  DVT prophylaxis Lovenox and SCDs     Per Dr. Wilcox's note: \"I met with the patient's cousin who called 911 as well as his caretaker/friend today  They both reported a significant decrease in his mentation over the past month after a medication change but could not dictate the medication change  The patient's friend who is very involved in his care reports he feels the patient would be safe to return home if his mental status returns to that what it was a month ago\"  PT and OT " have evaluated and recommend placement in a skilled nursing facility  Patient is agreeable to placement in a skilled nursing facility, but will need family member or POA to help make the decision for him due to his confusion.

## 2024-03-10 LAB
ALBUMIN SERPL BCP-MCNC: 3.4 G/DL (ref 3.4–5)
ANION GAP SERPL CALC-SCNC: 11 MMOL/L (ref 10–20)
ATRIAL RATE: 95 BPM
BUN SERPL-MCNC: 10 MG/DL (ref 6–23)
CALCIUM SERPL-MCNC: 8.3 MG/DL (ref 8.6–10.3)
CHLORIDE SERPL-SCNC: 94 MMOL/L (ref 98–107)
CO2 SERPL-SCNC: 37 MMOL/L (ref 21–32)
CREAT SERPL-MCNC: 0.68 MG/DL (ref 0.5–1.3)
EGFRCR SERPLBLD CKD-EPI 2021: >90 ML/MIN/1.73M*2
ERYTHROCYTE [DISTWIDTH] IN BLOOD BY AUTOMATED COUNT: 12.3 % (ref 11.5–14.5)
GLUCOSE SERPL-MCNC: 81 MG/DL (ref 74–99)
HCT VFR BLD AUTO: 37.4 % (ref 41–52)
HGB BLD-MCNC: 12 G/DL (ref 13.5–17.5)
MAGNESIUM SERPL-MCNC: 1.99 MG/DL (ref 1.6–2.4)
MCH RBC QN AUTO: 29.2 PG (ref 26–34)
MCHC RBC AUTO-ENTMCNC: 32.1 G/DL (ref 32–36)
MCV RBC AUTO: 91 FL (ref 80–100)
NRBC BLD-RTO: 0 /100 WBCS (ref 0–0)
P AXIS: 81 DEGREES
P OFFSET: 188 MS
P ONSET: 147 MS
PHOSPHATE SERPL-MCNC: 3.8 MG/DL (ref 2.5–4.9)
PLATELET # BLD AUTO: 159 X10*3/UL (ref 150–450)
POTASSIUM SERPL-SCNC: 3.5 MMOL/L (ref 3.5–5.3)
PR INTERVAL: 134 MS
Q ONSET: 214 MS
QRS COUNT: 16 BEATS
QRS DURATION: 76 MS
QT INTERVAL: 350 MS
QTC CALCULATION(BAZETT): 439 MS
QTC FREDERICIA: 407 MS
R AXIS: -62 DEGREES
RBC # BLD AUTO: 4.11 X10*6/UL (ref 4.5–5.9)
SODIUM SERPL-SCNC: 138 MMOL/L (ref 136–145)
T AXIS: 60 DEGREES
T OFFSET: 389 MS
VENTRICULAR RATE: 95 BPM
WBC # BLD AUTO: 4.8 X10*3/UL (ref 4.4–11.3)

## 2024-03-10 PROCEDURE — 2500000004 HC RX 250 GENERAL PHARMACY W/ HCPCS (ALT 636 FOR OP/ED): Performed by: NURSE PRACTITIONER

## 2024-03-10 PROCEDURE — 2500000001 HC RX 250 WO HCPCS SELF ADMINISTERED DRUGS (ALT 637 FOR MEDICARE OP): Performed by: NURSE PRACTITIONER

## 2024-03-10 PROCEDURE — 2500000001 HC RX 250 WO HCPCS SELF ADMINISTERED DRUGS (ALT 637 FOR MEDICARE OP): Performed by: INTERNAL MEDICINE

## 2024-03-10 PROCEDURE — 2500000004 HC RX 250 GENERAL PHARMACY W/ HCPCS (ALT 636 FOR OP/ED): Performed by: INTERNAL MEDICINE

## 2024-03-10 PROCEDURE — S4991 NICOTINE PATCH NONLEGEND: HCPCS | Performed by: FAMILY MEDICINE

## 2024-03-10 PROCEDURE — 2500000002 HC RX 250 W HCPCS SELF ADMINISTERED DRUGS (ALT 637 FOR MEDICARE OP, ALT 636 FOR OP/ED): Performed by: FAMILY MEDICINE

## 2024-03-10 PROCEDURE — C9113 INJ PANTOPRAZOLE SODIUM, VIA: HCPCS | Performed by: NURSE PRACTITIONER

## 2024-03-10 PROCEDURE — 2500000002 HC RX 250 W HCPCS SELF ADMINISTERED DRUGS (ALT 637 FOR MEDICARE OP, ALT 636 FOR OP/ED): Performed by: INTERNAL MEDICINE

## 2024-03-10 PROCEDURE — 2500000001 HC RX 250 WO HCPCS SELF ADMINISTERED DRUGS (ALT 637 FOR MEDICARE OP): Performed by: FAMILY MEDICINE

## 2024-03-10 PROCEDURE — 36415 COLL VENOUS BLD VENIPUNCTURE: CPT | Performed by: INTERNAL MEDICINE

## 2024-03-10 PROCEDURE — 99232 SBSQ HOSP IP/OBS MODERATE 35: CPT | Performed by: INTERNAL MEDICINE

## 2024-03-10 PROCEDURE — 1200000002 HC GENERAL ROOM WITH TELEMETRY DAILY

## 2024-03-10 PROCEDURE — 83735 ASSAY OF MAGNESIUM: CPT | Performed by: INTERNAL MEDICINE

## 2024-03-10 PROCEDURE — 85027 COMPLETE CBC AUTOMATED: CPT | Performed by: INTERNAL MEDICINE

## 2024-03-10 PROCEDURE — G0180 MD CERTIFICATION HHA PATIENT: HCPCS | Performed by: NURSE PRACTITIONER

## 2024-03-10 PROCEDURE — 80069 RENAL FUNCTION PANEL: CPT | Performed by: INTERNAL MEDICINE

## 2024-03-10 RX ORDER — ACETAMINOPHEN 325 MG/1
650 TABLET ORAL EVERY 6 HOURS PRN
Status: DISCONTINUED | OUTPATIENT
Start: 2024-03-10 | End: 2024-03-18 | Stop reason: HOSPADM

## 2024-03-10 RX ADMIN — LORAZEPAM 1 MG: 1 TABLET ORAL at 08:03

## 2024-03-10 RX ADMIN — CARBIDOPA AND LEVODOPA 1 TABLET: 25; 100 TABLET ORAL at 15:08

## 2024-03-10 RX ADMIN — FLUTICASONE FUROATE AND VILANTEROL TRIFENATATE 1 PUFF: 200; 25 POWDER RESPIRATORY (INHALATION) at 07:57

## 2024-03-10 RX ADMIN — THIAMINE HCL TAB 100 MG 100 MG: 100 TAB at 08:04

## 2024-03-10 RX ADMIN — FOLIC ACID 1 MG: 1 TABLET ORAL at 08:03

## 2024-03-10 RX ADMIN — ESCITALOPRAM OXALATE 20 MG: 20 TABLET ORAL at 08:03

## 2024-03-10 RX ADMIN — CARBIDOPA AND LEVODOPA 1 TABLET: 25; 100 TABLET ORAL at 08:02

## 2024-03-10 RX ADMIN — GABAPENTIN 300 MG: 300 CAPSULE ORAL at 08:03

## 2024-03-10 RX ADMIN — POTASSIUM CHLORIDE 20 MEQ: 1.5 FOR SOLUTION ORAL at 08:03

## 2024-03-10 RX ADMIN — GABAPENTIN 300 MG: 300 CAPSULE ORAL at 15:08

## 2024-03-10 RX ADMIN — LORAZEPAM 1 MG: 1 TABLET ORAL at 22:29

## 2024-03-10 RX ADMIN — LORAZEPAM 1 MG: 1 TABLET ORAL at 15:08

## 2024-03-10 RX ADMIN — APIXABAN 10 MG: 5 TABLET, FILM COATED ORAL at 08:02

## 2024-03-10 RX ADMIN — TIOTROPIUM BROMIDE INHALATION SPRAY 2 PUFF: 3.12 SPRAY, METERED RESPIRATORY (INHALATION) at 07:57

## 2024-03-10 RX ADMIN — NICOTINE 1 PATCH: 21 PATCH, EXTENDED RELEASE TRANSDERMAL at 08:03

## 2024-03-10 RX ADMIN — PANTOPRAZOLE SODIUM 40 MG: 40 INJECTION, POWDER, FOR SOLUTION INTRAVENOUS at 08:03

## 2024-03-10 RX ADMIN — BUPRENORPHINE AND NALOXONE 1 TABLET: 8; 2 TABLET SUBLINGUAL at 22:29

## 2024-03-10 RX ADMIN — DOCUSATE SODIUM 100 MG: 100 CAPSULE, LIQUID FILLED ORAL at 08:03

## 2024-03-10 RX ADMIN — BUPRENORPHINE AND NALOXONE 1 TABLET: 8; 2 TABLET SUBLINGUAL at 09:35

## 2024-03-10 RX ADMIN — PREDNISONE 10 MG: 10 TABLET ORAL at 08:03

## 2024-03-10 RX ADMIN — CARBIDOPA AND LEVODOPA 1 TABLET: 25; 100 TABLET ORAL at 22:30

## 2024-03-10 ASSESSMENT — COGNITIVE AND FUNCTIONAL STATUS - GENERAL
TOILETING: TOTAL
TOILETING: TOTAL
MOVING FROM LYING ON BACK TO SITTING ON SIDE OF FLAT BED WITH BEDRAILS: A LITTLE
HELP NEEDED FOR BATHING: TOTAL
DRESSING REGULAR LOWER BODY CLOTHING: TOTAL
WALKING IN HOSPITAL ROOM: A LOT
MOVING TO AND FROM BED TO CHAIR: A LOT
TURNING FROM BACK TO SIDE WHILE IN FLAT BAD: A LITTLE
DAILY ACTIVITIY SCORE: 8
CLIMB 3 TO 5 STEPS WITH RAILING: TOTAL
STANDING UP FROM CHAIR USING ARMS: A LOT
MOVING FROM LYING ON BACK TO SITTING ON SIDE OF FLAT BED WITH BEDRAILS: A LITTLE
PERSONAL GROOMING: TOTAL
DRESSING REGULAR LOWER BODY CLOTHING: TOTAL
DRESSING REGULAR UPPER BODY CLOTHING: TOTAL
CLIMB 3 TO 5 STEPS WITH RAILING: TOTAL
EATING MEALS: A LITTLE
WALKING IN HOSPITAL ROOM: A LOT
STANDING UP FROM CHAIR USING ARMS: A LOT
EATING MEALS: TOTAL
MOVING TO AND FROM BED TO CHAIR: A LOT
MOBILITY SCORE: 13
HELP NEEDED FOR BATHING: TOTAL
DAILY ACTIVITIY SCORE: 6
TURNING FROM BACK TO SIDE WHILE IN FLAT BAD: A LITTLE
MOBILITY SCORE: 13
PERSONAL GROOMING: TOTAL
DRESSING REGULAR UPPER BODY CLOTHING: TOTAL

## 2024-03-10 ASSESSMENT — PAIN SCALES - GENERAL
PAINLEVEL_OUTOF10: 0 - NO PAIN
PAINLEVEL_OUTOF10: 0 - NO PAIN

## 2024-03-10 ASSESSMENT — PAIN - FUNCTIONAL ASSESSMENT: PAIN_FUNCTIONAL_ASSESSMENT: 0-10

## 2024-03-10 NOTE — PROGRESS NOTES
"  Subjective    Patient reports breathing is okay.  He denies other acute complaints.    Objective    Vitals  Visit Vitals  /72 (BP Location: Left arm, Patient Position: Lying)   Pulse 60   Temp 36.9 °C (98.4 °F) (Temporal)   Resp 17   Ht 1.75 m (5' 8.9\")   Wt 80 kg (176 lb 5.9 oz)   SpO2 94%   BMI 26.12 kg/m²   Smoking Status Former   BSA 1.97 m²       Physical Exam   General: Alert and oriented to self, bday, president and yr.  NAD.  HEENT: Clear sclera.  CVS: RRR.   Lungs: Mild diminished breath sounds.      Abdomen: Soft.  NT. +BS.  Extremities: No pitting edema bilat ankles.  Psychiatric: Cooperative.     IOs    Intake/Output Summary (Last 24 hours) at 3/10/2024 1753  Last data filed at 3/10/2024 1400  Gross per 24 hour   Intake 180 ml   Output 1050 ml   Net -870 ml       Labs:   Results from last 72 hours   Lab Units 03/10/24  0734 03/09/24  0656 03/08/24  0612   SODIUM mmol/L 138 136 138   POTASSIUM mmol/L 3.5 3.5 3.5   CHLORIDE mmol/L 94* 94* 95*   CO2 mmol/L 37* 37* 38*   BUN mg/dL 10 12 13   CREATININE mg/dL 0.68 0.67 0.73   GLUCOSE mg/dL 81 86 97   CALCIUM mg/dL 8.3* 8.2* 8.5*   ANION GAP mmol/L 11 9* 9*   EGFR mL/min/1.73m*2 >90 >90 >90   PHOSPHORUS mg/dL 3.8 3.7 3.6      Results from last 72 hours   Lab Units 03/10/24  0734 03/09/24  0656 03/08/24  0612   WBC AUTO x10*3/uL 4.8 6.6 5.9   HEMOGLOBIN g/dL 12.0* 11.3* 12.1*   HEMATOCRIT % 37.4* 36.5* 38.2*   PLATELETS AUTO x10*3/uL 159 155 166      Lab Results   Component Value Date    CALCIUM 8.3 (L) 03/10/2024    PHOS 3.8 03/10/2024      No results found for: \"CRP\"   [unfilled]       Images  ECG 12 lead  Sinus rhythm with Premature atrial complexes  Left axis deviation  Septal infarct , age undetermined  Abnormal ECG  When compared with ECG of 02-MAY-2022 00:06,  Premature atrial complexes are now Present  Septal infarct is now Present  See ED provider note for full interpretation and clinical correlation  Confirmed by Karen Thibodeaux " ([id number]) on 3/10/2024 12:39:54 PM      Meds  Scheduled medications  apixaban, 10 mg, oral, BID   Followed by  [START ON 3/11/2024] apixaban, 5 mg, oral, BID  atorvastatin, 80 mg, oral, Nightly  buprenorphine-naloxone, 1 tablet, sublingual, BID  carbidopa-levodopa, 1 tablet, oral, TID  docusate sodium, 100 mg, oral, BID  escitalopram, 20 mg, oral, Daily  fluticasone furoate-vilanteroL, 1 puff, inhalation, Daily  folic acid, 1 mg, oral, Daily  gabapentin, 300 mg, oral, TID  LORazepam, 1 mg, oral, q6h MASON  nicotine, 1 patch, transdermal, Daily  pantoprazole, 40 mg, intravenous, Daily  potassium chloride, 20 mEq, oral, Daily  potassium chloride CR, 20 mEq, oral, Once  predniSONE, 10 mg, oral, Daily  thiamine, 100 mg, oral, Daily  tiotropium, 2 puff, inhalation, Daily  [Held by provider] torsemide, 10 mg, oral, Daily  [Held by provider] traZODone, 100 mg, oral, Nightly      Continuous medications     PRN medications  PRN medications: albuterol, [Held by provider] busPIRone, oxygen, oxygen, QUEtiapine     Assessment and Plan    Chris Wu is a 68 y.o. male with pertinent hx of COPD, Parkinson's disorder, history of factor V Leiden mutation with DVT and PE in the past & appears to not be on anticoagulation currently and is s/p IVC filter, depression/anxiety & PTSD & diastolic CHF who was admitted to the hospital secondary to acute encephalopathy.     Altered mental status/encephalopathy  Likely due to  polypharmacy.  CTA of head and neck reveals no evidence of hemodynamically significant stenosis of the cervical vessels with mild arteriosclerotic narrowing of bilateral carotid bulbs and no evidence of significant stenosis of the large branch vessel cutoffs of the intracranial vessels  CT of the head showed no acute findings  CT of C-spine showed no acute findings  CT of chest/abdomen/pelvis w/ 6 mm right upper lobe nodule  CT of thoracic and lumbar spine showed no evidence of injury  Pelvic x-ray showed pelvic ring  intact and no acute fractures  MRI was canceled as patient initially could not answer questions however patient has significantly improved with medication estimates  Neurology evaluated the patient as well.  I spoke with patient's daughter, Orin, on the phone on 3/6, and updated her on patient's condition. She has been in contact with her father over the past 6 months, but had not been in contact with him for several years prior, and she states that she felt patient likely had some cognitive decline at least to some degree.  I discussed with her that I am speculating if patient has some underlying dementia, that may be undiagnosed, low with underlying psychiatric issues, along with recent medication changes causing his symptoms.    I reviewed records from [facility] from office visit on 12/28/2023.  He has a diagnosis of generalized anxiety disorder which they were treating him for at [facility].  -Psychiatry consulted and I discussed the case with Dr. Ramirez on 3/8. Will try to taper down ativan by 0.5mg.   -Today, 3/10,patient is alert and oriented to self, birthday, president and year. Decreased Ativan to 1mg every 6 hours on 3/9.  We decreased prednisone from 20 mg daily to 10 mg daily.  -Continue to monitor.     Polypharmacy  In review of chart patient is on multiple sedative medications including Seroquel, oxazepam, Vistaril, gabapentin, Suboxone, baclofen, trazodone, Lexapro and BuSpar  The patient's cousin and friend who also cares for the patient reported patient's mentation has significantly worsened over the past month when his medications were adjusted however cannot specify which medication was new  Decreased home medication of baclofen from 10 mg 3 times daily to 5 mg 3 times daily  Cont home medication of Suboxone 8/2 1 tab sublingual BID  Blood pressure has been controlled, will continue to hold clonidine 0.1 mg twice daily  Continue escitalopram 20mg every day   Decreased gabapentin 600 mg 3  "times daily to 300 mg 3 times daily  -Patient on Suboxone chronically as well.  -Continue Seroquel 25 mg QID PRN, which is a decrease from his home dose of 50 mg 4 times daily as needed  -Trazdoone is on hold.   -Patient apparently had been on oxazepam 30 mg 4 times daily.  Changed to Ativan 2 mg 4 times daily here.  As stated above, tapering down on Ativan and down to 1mg q6hrs now. Consider reducing further if possible.    -Monitor.       Left lower extremity DVT on ultrasound 3/4/2024 with history of factor V Leiden mutation with prior DVT and PE in the past status post IVC filter placement in the past  -Continue Eliquis, which was started on 3/4/2024.     6 mm right upper lobe nodule  -Follow-up with pulmonology and primary care provider as an outpatient for further evaluation management.       Paroxysmal respiratory failure due to COPD  Patient requires oxygen at 3 L a minute at baseline  Continue LABA  -As it appears patient has been on prednisone recently, unclear if he has been on it long enough as an outpatient to cause adrenal suppression.  So, started tapering down to 10 mg daily on 3/9, as he was on 20 mg daily.    -Monitor.     Hypokalemia  -Replaced.   -Monitor.     Inability to care for self  PT OT and social work consulted     Parkinson disease  Continue carbidopa levodopa     Depression ADD/PTSD  Lexapro continue  Holding BuSpar, trazodone due to concerns for polypharmacy     GERD  Continue PPI  DVT prophylaxis Lovenox and SCDs     Per Dr. Wilcox's note: \"I met with the patient's cousin who called 911 as well as his caretaker/friend today  They both reported a significant decrease in his mentation over the past month after a medication change but could not dictate the medication change  The patient's friend who is very involved in his care reports he feels the patient would be safe to return home if his mental status returns to that what it was a month ago\"  PT and OT have evaluated and recommend " placement in a skilled nursing facility  Patient is agreeable to placement in a skilled nursing facility, but will need family member or POA to help make the decision for him due to his confusion.

## 2024-03-10 NOTE — CARE PLAN
The patient's goals for the shift include      The clinical goals for the shift include to identify and treat the underlying cause of confusion, promote safety and prevent harm      Problem: Respiratory  Goal: Clear secretions with interventions this shift  Outcome: Progressing  Goal: Minimize anxiety/maximize coping throughout shift  Outcome: Progressing  Goal: Minimal/no exertional discomfort or dyspnea this shift  Outcome: Progressing  Goal: No signs of respiratory distress (eg. Use of accessory muscles. Peds grunting)  Outcome: Progressing  Goal: Patent airway maintained this shift  Outcome: Progressing  Goal: Tolerate mechanical ventilation evidenced by VS/agitation level this shift  Outcome: Progressing  Goal: Tolerate pulmonary toileting this shift  Outcome: Progressing  Goal: Verbalize decreased shortness of breath this shift  Outcome: Progressing  Goal: Wean oxygen to maintain O2 saturation per order/standard this shift  Outcome: Progressing  Goal: Increase self care and/or family involvement in next 24 hours  Outcome: Progressing     Problem: Skin  Goal: Decreased wound size/increased tissue granulation at next dressing change  Outcome: Progressing  Flowsheets (Taken 3/10/2024 1016)  Decreased wound size/increased tissue granulation at next dressing change: Protective dressings over bony prominences  Goal: Participates in plan/prevention/treatment measures  Outcome: Progressing  Goal: Prevent/manage excess moisture  Outcome: Progressing  Goal: Prevent/minimize sheer/friction injuries  Outcome: Progressing  Goal: Promote/optimize nutrition  Outcome: Progressing  Goal: Promote skin healing  Outcome: Progressing     Problem: Pain  Goal: My pain/discomfort is manageable  Outcome: Progressing     Problem: Safety  Goal: Patient will be injury free during hospitalization  Outcome: Progressing  Goal: I will remain free of falls  Outcome: Progressing

## 2024-03-11 LAB
ALBUMIN SERPL BCP-MCNC: 3.6 G/DL (ref 3.4–5)
ANION GAP SERPL CALC-SCNC: 11 MMOL/L (ref 10–20)
BUN SERPL-MCNC: 11 MG/DL (ref 6–23)
CALCIUM SERPL-MCNC: 8.3 MG/DL (ref 8.6–10.3)
CHLORIDE SERPL-SCNC: 94 MMOL/L (ref 98–107)
CO2 SERPL-SCNC: 36 MMOL/L (ref 21–32)
CREAT SERPL-MCNC: 0.71 MG/DL (ref 0.5–1.3)
EGFRCR SERPLBLD CKD-EPI 2021: >90 ML/MIN/1.73M*2
ERYTHROCYTE [DISTWIDTH] IN BLOOD BY AUTOMATED COUNT: 12.4 % (ref 11.5–14.5)
GLUCOSE SERPL-MCNC: 80 MG/DL (ref 74–99)
HCT VFR BLD AUTO: 42.8 % (ref 41–52)
HGB BLD-MCNC: 13 G/DL (ref 13.5–17.5)
MAGNESIUM SERPL-MCNC: 2.08 MG/DL (ref 1.6–2.4)
MCH RBC QN AUTO: 28.3 PG (ref 26–34)
MCHC RBC AUTO-ENTMCNC: 30.4 G/DL (ref 32–36)
MCV RBC AUTO: 93 FL (ref 80–100)
NRBC BLD-RTO: 0 /100 WBCS (ref 0–0)
PHOSPHATE SERPL-MCNC: 3.4 MG/DL (ref 2.5–4.9)
PLATELET # BLD AUTO: 174 X10*3/UL (ref 150–450)
POTASSIUM SERPL-SCNC: 3.7 MMOL/L (ref 3.5–5.3)
RBC # BLD AUTO: 4.59 X10*6/UL (ref 4.5–5.9)
SODIUM SERPL-SCNC: 137 MMOL/L (ref 136–145)
WBC # BLD AUTO: 5.5 X10*3/UL (ref 4.4–11.3)

## 2024-03-11 PROCEDURE — 2500000001 HC RX 250 WO HCPCS SELF ADMINISTERED DRUGS (ALT 637 FOR MEDICARE OP): Performed by: FAMILY MEDICINE

## 2024-03-11 PROCEDURE — 2500000004 HC RX 250 GENERAL PHARMACY W/ HCPCS (ALT 636 FOR OP/ED): Performed by: NURSE PRACTITIONER

## 2024-03-11 PROCEDURE — 2500000002 HC RX 250 W HCPCS SELF ADMINISTERED DRUGS (ALT 637 FOR MEDICARE OP, ALT 636 FOR OP/ED): Performed by: INTERNAL MEDICINE

## 2024-03-11 PROCEDURE — 2500000001 HC RX 250 WO HCPCS SELF ADMINISTERED DRUGS (ALT 637 FOR MEDICARE OP): Performed by: NURSE PRACTITIONER

## 2024-03-11 PROCEDURE — 85027 COMPLETE CBC AUTOMATED: CPT | Performed by: INTERNAL MEDICINE

## 2024-03-11 PROCEDURE — 36415 COLL VENOUS BLD VENIPUNCTURE: CPT | Performed by: INTERNAL MEDICINE

## 2024-03-11 PROCEDURE — 1200000002 HC GENERAL ROOM WITH TELEMETRY DAILY

## 2024-03-11 PROCEDURE — 2500000001 HC RX 250 WO HCPCS SELF ADMINISTERED DRUGS (ALT 637 FOR MEDICARE OP): Performed by: INTERNAL MEDICINE

## 2024-03-11 PROCEDURE — C9113 INJ PANTOPRAZOLE SODIUM, VIA: HCPCS | Performed by: NURSE PRACTITIONER

## 2024-03-11 PROCEDURE — 2500000004 HC RX 250 GENERAL PHARMACY W/ HCPCS (ALT 636 FOR OP/ED): Performed by: INTERNAL MEDICINE

## 2024-03-11 PROCEDURE — 83735 ASSAY OF MAGNESIUM: CPT | Performed by: INTERNAL MEDICINE

## 2024-03-11 PROCEDURE — S4991 NICOTINE PATCH NONLEGEND: HCPCS | Performed by: FAMILY MEDICINE

## 2024-03-11 PROCEDURE — 80069 RENAL FUNCTION PANEL: CPT | Performed by: INTERNAL MEDICINE

## 2024-03-11 PROCEDURE — 99232 SBSQ HOSP IP/OBS MODERATE 35: CPT | Performed by: INTERNAL MEDICINE

## 2024-03-11 PROCEDURE — 2500000002 HC RX 250 W HCPCS SELF ADMINISTERED DRUGS (ALT 637 FOR MEDICARE OP, ALT 636 FOR OP/ED): Performed by: FAMILY MEDICINE

## 2024-03-11 RX ORDER — LORAZEPAM 0.5 MG/1
0.5 TABLET ORAL EVERY 6 HOURS SCHEDULED
Status: DISCONTINUED | OUTPATIENT
Start: 2024-03-11 | End: 2024-03-12

## 2024-03-11 RX ADMIN — GABAPENTIN 300 MG: 300 CAPSULE ORAL at 09:51

## 2024-03-11 RX ADMIN — NICOTINE 1 PATCH: 21 PATCH, EXTENDED RELEASE TRANSDERMAL at 09:51

## 2024-03-11 RX ADMIN — ATORVASTATIN CALCIUM 80 MG: 80 TABLET, FILM COATED ORAL at 21:53

## 2024-03-11 RX ADMIN — CARBIDOPA AND LEVODOPA 1 TABLET: 25; 100 TABLET ORAL at 21:53

## 2024-03-11 RX ADMIN — BUPRENORPHINE AND NALOXONE 1 TABLET: 8; 2 TABLET SUBLINGUAL at 09:51

## 2024-03-11 RX ADMIN — LORAZEPAM 1 MG: 1 TABLET ORAL at 03:17

## 2024-03-11 RX ADMIN — PREDNISONE 10 MG: 10 TABLET ORAL at 09:51

## 2024-03-11 RX ADMIN — CARBIDOPA AND LEVODOPA 1 TABLET: 25; 100 TABLET ORAL at 09:51

## 2024-03-11 RX ADMIN — LORAZEPAM 0.5 MG: 0.5 TABLET ORAL at 09:51

## 2024-03-11 RX ADMIN — GABAPENTIN 300 MG: 300 CAPSULE ORAL at 21:53

## 2024-03-11 RX ADMIN — TIOTROPIUM BROMIDE INHALATION SPRAY 2 PUFF: 3.12 SPRAY, METERED RESPIRATORY (INHALATION) at 09:51

## 2024-03-11 RX ADMIN — APIXABAN 5 MG: 5 TABLET, FILM COATED ORAL at 21:54

## 2024-03-11 RX ADMIN — PANTOPRAZOLE SODIUM 40 MG: 40 INJECTION, POWDER, FOR SOLUTION INTRAVENOUS at 09:51

## 2024-03-11 RX ADMIN — APIXABAN 10 MG: 5 TABLET, FILM COATED ORAL at 09:51

## 2024-03-11 RX ADMIN — BUPRENORPHINE AND NALOXONE 1 TABLET: 8; 2 TABLET SUBLINGUAL at 21:53

## 2024-03-11 RX ADMIN — DOCUSATE SODIUM 100 MG: 100 CAPSULE, LIQUID FILLED ORAL at 21:53

## 2024-03-11 RX ADMIN — LORAZEPAM 0.5 MG: 0.5 TABLET ORAL at 21:54

## 2024-03-11 RX ADMIN — FLUTICASONE FUROATE AND VILANTEROL TRIFENATATE 1 PUFF: 200; 25 POWDER RESPIRATORY (INHALATION) at 09:52

## 2024-03-11 RX ADMIN — ESCITALOPRAM OXALATE 20 MG: 20 TABLET ORAL at 09:51

## 2024-03-11 RX ADMIN — POTASSIUM CHLORIDE 20 MEQ: 1.5 FOR SOLUTION ORAL at 09:52

## 2024-03-11 ASSESSMENT — COGNITIVE AND FUNCTIONAL STATUS - GENERAL
MOVING TO AND FROM BED TO CHAIR: A LOT
MOVING FROM LYING ON BACK TO SITTING ON SIDE OF FLAT BED WITH BEDRAILS: A LITTLE
CLIMB 3 TO 5 STEPS WITH RAILING: TOTAL
HELP NEEDED FOR BATHING: TOTAL
STANDING UP FROM CHAIR USING ARMS: A LOT
MOBILITY SCORE: 13
WALKING IN HOSPITAL ROOM: A LOT
EATING MEALS: A LOT
TOILETING: TOTAL
DAILY ACTIVITIY SCORE: 7
DRESSING REGULAR LOWER BODY CLOTHING: TOTAL
TURNING FROM BACK TO SIDE WHILE IN FLAT BAD: A LITTLE
DRESSING REGULAR UPPER BODY CLOTHING: TOTAL
PERSONAL GROOMING: TOTAL

## 2024-03-11 ASSESSMENT — PAIN - FUNCTIONAL ASSESSMENT
PAIN_FUNCTIONAL_ASSESSMENT: 0-10
PAIN_FUNCTIONAL_ASSESSMENT: 0-10

## 2024-03-11 ASSESSMENT — PAIN SCALES - GENERAL
PAINLEVEL_OUTOF10: 0 - NO PAIN
PAINLEVEL_OUTOF10: 0 - NO PAIN

## 2024-03-11 NOTE — CARE PLAN
The clinical goals for the shift include pt will take meds without difficulty      Problem: Skin  Goal: Prevent/manage excess moisture  Outcome: Progressing  Flowsheets (Taken 3/11/2024 1808)  Prevent/manage excess moisture: Follow provider orders for dressing changes  Goal: Prevent/minimize sheer/friction injuries  Outcome: Progressing  Flowsheets (Taken 3/11/2024 1808)  Prevent/minimize sheer/friction injuries:   Turn/reposition every 2 hours/use positioning/transfer devices   Use pull sheet  Goal: Promote/optimize nutrition  Outcome: Progressing  Flowsheets (Taken 3/11/2024 1808)  Promote/optimize nutrition: Assist with feeding     Pt sat up in the chair. Pt has poor PO intake, supplements ordered. Pt slept most  of the afternoon and evening. Sitter remains at bedside.

## 2024-03-11 NOTE — NURSING NOTE
Wound care provided to bilateral ears as ordered. Patient with soft O2 cannula and also hearing aides in use both ears.  The skin to right ear is pink but fully intact, protective dressing applied.  The linear wound along the posterior left ear is very, very shallow, linear, no active drainage, no slough, healing with current plan of care.  His RN reports his skin is otherwise intact.

## 2024-03-11 NOTE — PROGRESS NOTES
"  Subjective   Chris Wu is a 68 y.o. year old male patient who was personally seen.  During interview patient lethargic and would not participate.  Sitter at bedside, reported he was awake not long ago.  Patients mentation waxing and waning throughout the day.  Patient currently not in restraints.           Objective   Mental Status Exam:   General: Appropriately groomed and dressed in hospital attire, reclined up in bed, unable to be aroused.   Appearance: Appears stated age.   Attitude: Unable to be evaluated.    Behavior: No eye contact.   Motor Activity:  No EPS/TD.  Unable to assess gait and station. Normal muscle tone and bulk.   Speech: Unable to be evaluated   Mood: \"Unable to verbalize   Affect: Neutral.   Thought Process: Unable to be evaluated.    Thought Content: Unable to be evaluated.    Thought Perception: Unable to be evaluated.    Cognition: Unable to be evaluated.    Insight: Unable to be evaluated.     Judgment: Unable to be evaluated.        LABS:  Results for orders placed or performed during the hospital encounter of 03/01/24 (from the past 24 hour(s))   CBC   Result Value Ref Range    WBC 5.5 4.4 - 11.3 x10*3/uL    nRBC 0.0 0.0 - 0.0 /100 WBCs    RBC 4.59 4.50 - 5.90 x10*6/uL    Hemoglobin 13.0 (L) 13.5 - 17.5 g/dL    Hematocrit 42.8 41.0 - 52.0 %    MCV 93 80 - 100 fL    MCH 28.3 26.0 - 34.0 pg    MCHC 30.4 (L) 32.0 - 36.0 g/dL    RDW 12.4 11.5 - 14.5 %    Platelets 174 150 - 450 x10*3/uL   Magnesium   Result Value Ref Range    Magnesium 2.08 1.60 - 2.40 mg/dL   Renal Function Panel   Result Value Ref Range    Glucose 80 74 - 99 mg/dL    Sodium 137 136 - 145 mmol/L    Potassium 3.7 3.5 - 5.3 mmol/L    Chloride 94 (L) 98 - 107 mmol/L    Bicarbonate 36 (H) 21 - 32 mmol/L    Anion Gap 11 10 - 20 mmol/L    Urea Nitrogen 11 6 - 23 mg/dL    Creatinine 0.71 0.50 - 1.30 mg/dL    eGFR >90 >60 mL/min/1.73m*2    Calcium 8.3 (L) 8.6 - 10.3 mg/dL    Phosphorus 3.4 2.5 - 4.9 mg/dL    Albumin 3.6 " 3.4 - 5.0 g/dL        Last Recorded Vitals  Visit Vitals  /73 (BP Location: Right arm, Patient Position: Lying)   Pulse 70   Temp 36.1 °C (96.9 °F) (Temporal)   Resp 16        Intake/Output last 3 Shifts:  No intake/output data recorded.    Relevant Results  Scheduled medications  apixaban, 10 mg, oral, BID   Followed by  apixaban, 5 mg, oral, BID  atorvastatin, 80 mg, oral, Nightly  buprenorphine-naloxone, 1 tablet, sublingual, BID  carbidopa-levodopa, 1 tablet, oral, TID  docusate sodium, 100 mg, oral, BID  escitalopram, 20 mg, oral, Daily  fluticasone furoate-vilanteroL, 1 puff, inhalation, Daily  folic acid, 1 mg, oral, Daily  gabapentin, 300 mg, oral, TID  LORazepam, 0.5 mg, oral, q6h MASON  nicotine, 1 patch, transdermal, Daily  pantoprazole, 40 mg, intravenous, Daily  potassium chloride, 20 mEq, oral, Daily  potassium chloride CR, 20 mEq, oral, Once  predniSONE, 10 mg, oral, Daily  thiamine, 100 mg, oral, Daily  tiotropium, 2 puff, inhalation, Daily  [Held by provider] torsemide, 10 mg, oral, Daily  [Held by provider] traZODone, 100 mg, oral, Nightly      Continuous medications     PRN medications  PRN medications: acetaminophen, albuterol, [Held by provider] busPIRone, oxygen, oxygen, QUEtiapine           Diagnostic Impression:  1) Delirium, acute  2) Encephalopathy  3) Parkinson's Disorder  4) COPD  5) Diastolic CHF  6) History of Factor V Leiden mutation        Recommendations:  1) Continue Lorazepam 0.5 mg PO Q6H  2) Continue Lexapro 20 mg PO QDAY   3) Continue to taper Prednisone (Steroid)  4) Continue 1-to-1 sitter for safety of patient  5) Will continue to follow, discussed with Dr. Morrison.           Odilia Bustamante, APRN, CNP, PMHNP

## 2024-03-11 NOTE — CONSULTS
Patient has Malnutrition Diagnosis: Yes  Diagnosis Status: New  Malnutrition Diagnosis: Severe malnutrition related to acute disease or injury  As Evidenced by: significant 5% weight loss/~ 1 week, loss of muscle and adipose stores, consuming less than 50% of estimtaed nutrition needs over past 5 days  Nutrition Assessment    Reason for Assessment: Dietitian discretion, Length of stay    Patient is a 68 y.o. male presenting with: Encephalopathy,   Past Medical History:   Diagnosis Date    Abnormal weight loss     Weight loss    Activated protein C resistance (CMS/HCC)     Heterozygous factor V Leiden mutation    Cervicalgia     Neck pain    Chronic sinusitis, unspecified     Sinusitis    Encounter for screening for malignant neoplasm of colon 03/18/2016    Encounter for screening for malignant neoplasm of colon    Muscle weakness (generalized)     Muscle weakness    Opioid use, unspecified, uncomplicated     Opioid use    Other conditions influencing health status     A Fall    Pain in unspecified limb     Limb pain    Pain in unspecified shoulder     Pain, joint, shoulder    Paresthesia of skin     Tingling    Personal history of other diseases of the nervous system and sense organs     History of migraine headaches    Personal history of other diseases of the respiratory system     History of pleurisy    Personal history of other mental and behavioral disorders     History of psychosis    Personal history of other specified conditions     History of nausea    Personal history of other specified conditions     History of dizziness    Personal history of other specified conditions     History of fatigue    Personal history of other venous thrombosis and embolism     History of deep venous thrombosis    Personal history of pulmonary embolism     History of pulmonary embolism    Unspecified abdominal pain 04/15/2016    Abdominal spasms      Past Surgical History:   Procedure Laterality Date    BACK SURGERY  09/19/2013  "   Lower Back Surgery    NECK SURGERY  09/19/2013    Neck Surgery    OTHER SURGICAL HISTORY  04/15/2016    Interruption Inferior V[place] Filter Placement      Nutrition History:  Food and Nutrient History: Pt with altered mental status , has sitter present, not able to provide history.  At beginning of hospital stay, pt was eating about 50%, now with minimal oral intakes x 2 days.  Pt only took a few bites of applesauce and yogurt this AM.  Energy Intake: Poor < 50 %  Allergies   Allergen Reactions    Aspirin Other     GI upset      GI Symptoms: None     Oral Problems: None       Anthropometrics:  Height: 175 cm (5' 8.9\")   Weight: 76 kg (167 lb 8.8 oz)   BMI (Calculated): 24.82  IBW/kg (Dietitian Calculated): 72.7 kg  Percent of IBW: 105 %       Weight History:   Wt Readings from Last 10 Encounters:   03/11/24 76 kg (167 lb 8.8 oz)   01/25/24 80.3 kg (177 lb)   01/23/24 79.4 kg (175 lb)   01/02/24 81.2 kg (179 lb)   11/20/23 79.4 kg (175 lb)   11/15/23 78 kg (172 lb)   11/08/23 78.9 kg (174 lb)   11/07/23 77.1 kg (170 lb)   11/01/23 79.4 kg (175 lb)   05/24/23 62.1 kg (137 lb)       Weight Change %:  Weight History / % Weight Change: 80 kg (3/1/24); 80.3 kg (1/25/24); 79.4 kg (11/20/23); 61.7 kg (3/6/23) ? accuracy  Significant Weight Loss: Yes  Interpretation of Weight Loss: >2% in 1 week (down 5%/1 week)       Nutrition Focused Physical Exam Findings:    Subcutaneous Fat Loss:   Orbital Fat Pads: Well nourshed (slightly bulging fat pads)  Buccal Fat Pads: Mild-Moderate (flat cheeks, minimal bounce)  Muscle Wasting:  Temporalis: Mild-Moderate (slight depression)  Pectoralis (Clavicular Region): Mild-Moderate (some protrusion of clavicle)  Edema:  Edema: +2 mild  Edema Location: generalized  Physical Findings:  Skin: Positive (bilat ear wounds)    Nutrition Significant Labs:  CBC Trend:   Results from last 7 days   Lab Units 03/11/24  0627 03/10/24  0734 03/09/24  0656 03/08/24  0612   WBC AUTO " "x10*3/uL 5.5 4.8 6.6 5.9   RBC AUTO x10*6/uL 4.59 4.11* 4.00* 4.19*   HEMOGLOBIN g/dL 13.0* 12.0* 11.3* 12.1*   HEMATOCRIT % 42.8 37.4* 36.5* 38.2*   MCV fL 93 91 91 91   PLATELETS AUTO x10*3/uL 174 159 155 166    , BMP Trend:   Results from last 7 days   Lab Units 03/11/24  0627 03/10/24  0734 03/09/24  0656 03/08/24  0612   GLUCOSE mg/dL 80 81 86 97   CALCIUM mg/dL 8.3* 8.3* 8.2* 8.5*   SODIUM mmol/L 137 138 136 138   POTASSIUM mmol/L 3.7 3.5 3.5 3.5   CO2 mmol/L 36* 37* 37* 38*   CHLORIDE mmol/L 94* 94* 94* 95*   BUN mg/dL 11 10 12 13   CREATININE mg/dL 0.71 0.68 0.67 0.73    , A1C:No results found for: \"HGBA1C\", BG POCT trend:   Results from last 7 days   Lab Units 03/08/24  0140 03/05/24  0154   POCT GLUCOSE mg/dL 119* 108*    , Renal Lab Trend:   Results from last 7 days   Lab Units 03/11/24  0627 03/10/24  0734 03/09/24  0656 03/08/24  0612   POTASSIUM mmol/L 3.7 3.5 3.5 3.5   PHOSPHORUS mg/dL 3.4 3.8 3.7 3.6   SODIUM mmol/L 137 138 136 138   MAGNESIUM mg/dL 2.08 1.99 1.85 1.90   EGFR mL/min/1.73m*2 >90 >90 >90 >90   BUN mg/dL 11 10 12 13   CREATININE mg/dL 0.71 0.68 0.67 0.73    , TPN/PPN Labs:   Results from last 7 days   Lab Units 03/11/24  0627 03/10/24  0734 03/09/24  0656 03/08/24  0612   GLUCOSE mg/dL 80 81 86 97   POTASSIUM mmol/L 3.7 3.5 3.5 3.5   PHOSPHORUS mg/dL 3.4 3.8 3.7 3.6   MAGNESIUM mg/dL 2.08 1.99 1.85 1.90   SODIUM mmol/L 137 138 136 138   CHLORIDE mmol/L 94* 94* 94* 95*    , Vit D:   Lab Results   Component Value Date    VITD25 14 (L) 05/16/2019    , Vit B12:   Lab Results   Component Value Date    LCASHGPC53 476 07/22/2021        Nutrition Specific Medications:      Current Facility-Administered Medications:     acetaminophen (Tylenol) tablet 650 mg, 650 mg, oral, q6h PRN, Renny Morrison DO    albuterol 2.5 mg /3 mL (0.083 %) nebulizer solution 2.5 mg, 2.5 mg, nebulization, q2h PRN, Malvin Wilcox DO, 2.5 mg at 03/08/24 2128    apixaban (Eliquis) tablet 10 mg, 10 mg, oral, BID, 10 mg " at 03/11/24 0951 **FOLLOWED BY** apixaban (Eliquis) tablet 5 mg, 5 mg, oral, BID, Malvin Wilcox DO    atorvastatin (Lipitor) tablet 80 mg, 80 mg, oral, Nightly, KENYATTA Odonnell-CNP, 80 mg at 03/08/24 2205    buprenorphine-naloxone (Suboxone) 8-2 mg per SL tablet 1 tablet, 1 tablet, sublingual, BID, Renny Morrison DO, 1 tablet at 03/11/24 0951    [Held by provider] busPIRone (Buspar) tablet 10 mg, 10 mg, oral, TID PRN, KENYATTA Odonnell-CNP    carbidopa-levodopa (Sinemet)  mg per tablet 1 tablet, 1 tablet, oral, TID, Malvin Wilcox DO, 1 tablet at 03/11/24 0951    docusate sodium (Colace) capsule 100 mg, 100 mg, oral, BID, KENYATTA Odonnell-CNP, 100 mg at 03/10/24 0803    escitalopram (Lexapro) tablet 20 mg, 20 mg, oral, Daily, Malvin Wilcox DO, 20 mg at 03/11/24 0951    fluticasone furoate-vilanteroL (Breo Ellipta) 200-25 mcg/dose inhaler 1 puff, 1 puff, inhalation, Daily, Malvin Wilcox DO, 1 puff at 03/11/24 0952    folic acid (Folvite) tablet 1 mg, 1 mg, oral, Daily, KENYATTA Odonnell-CNP, 1 mg at 03/10/24 0803    gabapentin (Neurontin) capsule 300 mg, 300 mg, oral, TID, Malvin Wilcox DO, 300 mg at 03/11/24 0951    LORazepam (Ativan) tablet 0.5 mg, 0.5 mg, oral, q6h MASON, Renny Morrison DO, 0.5 mg at 03/11/24 0951    nicotine (Nicoderm CQ) 21 mg/24 hr patch 1 patch, 1 patch, transdermal, Daily, Malvin Wilcox DO, 1 patch at 03/11/24 0951    oxygen (O2) therapy, , inhalation, Continuous PRN - O2/gases, Robert Hansen DO, 3 L/min at 03/01/24 2201    oxygen (O2) therapy, , inhalation, Continuous PRN - O2/gases, KENYATTA Odonnell-CNP, 3 L/min at 03/10/24 2230    pantoprazole (ProtoNix) injection 40 mg, 40 mg, intravenous, Daily, Lyndsaygladis Augustine APRN-CNP, 40 mg at 03/11/24 0951    potassium chloride (Klor-Con) packet 20 mEq, 20 mEq, oral, Daily, Lyndsay Augustine APRN-CNP, 20 mEq at 03/11/24 0952    potassium chloride CR (Klor-Con M20) ER tablet 20 mEq, 20 mEq, oral, Once, Renny L Brown,  DO    predniSONE (Deltasone) tablet 10 mg, 10 mg, oral, Daily, Renny Morrison, , 10 mg at 03/11/24 0951    QUEtiapine (SEROquel) tablet 25 mg, 25 mg, oral, 4x daily PRN, Malvin Wilcox DO, 25 mg at 03/05/24 0905    thiamine (Vitamin B-1) tablet 100 mg, 100 mg, oral, Daily, KENYATTA Odonnell-CNP, 100 mg at 03/10/24 0804    tiotropium (Spiriva Respimat) 2.5 mcg/actuation inhaler 2 puff, 2 puff, inhalation, Daily, 2 puff at 03/11/24 0951 **AND** [DISCONTINUED] fluticasone furoate-vilanteroL (Breo Ellipta) 200-25 mcg/dose inhaler 1 puff, 1 puff, inhalation, Daily, KENYATTA Odonnell-CNP, 1 puff at 03/04/24 0911    [Held by provider] torsemide (Demadex) tablet 10 mg, 10 mg, oral, Daily, Malvin Wilcox DO, 10 mg at 03/05/24 0857    [Held by provider] traZODone (Desyrel) tablet 100 mg, 100 mg, oral, Nightly, Malvin Wilcox DO, 100 mg at 03/06/24 2148   Nursing Data Per flowsheet:   Stool Appearance: Soft, Formed (03/02/24 1047)  Gastrointestinal  Gastrointestinal (WDL): Within Defined Limits  No intake or output data in the 24 hours ending 03/11/24 1549    Pain Score: 0 - No pain   Dietary Orders (From admission, onward)       Start     Ordered    03/11/24 1156  Oral nutritional supplements  Until discontinued        Question Answer Comment   Deliver with Breakfast    Deliver with Dinner    Select supplement: Magic Cup        03/11/24 1156    03/01/24 2233  Adult diet Regular  Diet effective now        Question:  Diet type  Answer:  Regular    03/01/24 2233                     Estimated Needs:      Method for Estimating Needs: 1900 kcal (25 kcal/kg)     Method for Estimating Needs:  g protein (1.2-1.5 g/kg)     Method for Estimating Needs: 1 ml/kcal       Nutrition Diagnosis   Malnutrition Diagnosis  Patient has Malnutrition Diagnosis: Yes  Diagnosis Status: New  Malnutrition Diagnosis: Severe malnutrition related to acute disease or injury  As Evidenced by: significant 5% weight loss/~ 1 week, loss of  muscle and adipose stores, consuming less than 50% of estimtaed nutrition needs over past 5 days            Nutrition Interventions/Recommendations   Nutrition Prescription:  Individualized Nutrition Prescription Provided for : diet, fluids    Nutrition Interventions:   Food and/or Nutrient Delivery Interventions  Interventions: Medical food supplement  Goal: Magic cup 2 x/day= 580 kcal, 18 g protein        Coordination of Care: Yoly RN  Nutrition Education:   Education Documentation  No documentation found.         Recommendations:  Feed pt as needed  Consider SLP eval  If oral intakes remain suboptimal, may need to consider enteral feeds via DHT as temporary measure  Consider Palliative care consult  Weights: 2-3 x/week         Nutrition Monitoring and Evaluation     Food/Nutrient Related History Monitoring  Monitoring and Evaluation Plan: Energy intake  Energy Intake: Estimated energy intake  Criteria: Pt will consume 50% of meals and ONS provided    Body Composition/Growth/Weight History  Monitoring and Evaluation Plan: Weight  Weight: Measured weight  Criteria: Pt will maintain current weight of 76 kg +/- 2 kg    Biochemical Data, Medical Tests and Procedures  Monitoring and Evaluation Plan: Electrolyte/renal panel, Glucose/endocrine profile  Electrolyte and Renal Panel: BUN, Calcium, serum, Chloride, Magnesium, Creatinine, Phosphorus, Potassium, Sodium  Criteria: RFP, Mg wnl  Glucose/Endocrine Profile: Glucose, casual  Criteria: BG level  mg/dL                 Time Spent (min): 60 minutes

## 2024-03-11 NOTE — PROGRESS NOTES
"  Subjective    Patient denies chest pain, shortness of breath, nausea, vomiting or diarrhea.    Objective    Vitals  Visit Vitals  /73 (BP Location: Right arm, Patient Position: Lying)   Pulse 70   Temp 36.1 °C (96.9 °F) (Temporal)   Resp 16   Ht 1.75 m (5' 8.9\")   Wt 76 kg (167 lb 8.8 oz)   SpO2 92%   BMI 24.82 kg/m²   Smoking Status Former   BSA 1.92 m²       Physical Exam   General: Alert and oriented to self, birthday, president and year.  No acute distress.  HEENT: Clear sclera.  CVS: RRR.   Lungs: Mild diminished breath sounds.      Abdomen: Soft.  Nontender.  Bowel sounds present.    Extremities: No pitting edema bilateral ankles.  Psychiatric: Cooperative.     IOs  No intake or output data in the 24 hours ending 03/11/24 1831    Labs:   Results from last 72 hours   Lab Units 03/11/24  0627 03/10/24  0734 03/09/24  0656   SODIUM mmol/L 137 138 136   POTASSIUM mmol/L 3.7 3.5 3.5   CHLORIDE mmol/L 94* 94* 94*   CO2 mmol/L 36* 37* 37*   BUN mg/dL 11 10 12   CREATININE mg/dL 0.71 0.68 0.67   GLUCOSE mg/dL 80 81 86   CALCIUM mg/dL 8.3* 8.3* 8.2*   ANION GAP mmol/L 11 11 9*   EGFR mL/min/1.73m*2 >90 >90 >90   PHOSPHORUS mg/dL 3.4 3.8 3.7      Results from last 72 hours   Lab Units 03/11/24  0627 03/10/24  0734 03/09/24  0656   WBC AUTO x10*3/uL 5.5 4.8 6.6   HEMOGLOBIN g/dL 13.0* 12.0* 11.3*   HEMATOCRIT % 42.8 37.4* 36.5*   PLATELETS AUTO x10*3/uL 174 159 155      Lab Results   Component Value Date    CALCIUM 8.3 (L) 03/11/2024    PHOS 3.4 03/11/2024      No results found for: \"CRP\"   [unfilled]       Images  ECG 12 lead  Sinus rhythm with Premature atrial complexes  Left axis deviation  Septal infarct , age undetermined  Abnormal ECG  When compared with ECG of 02-MAY-2022 00:06,  Premature atrial complexes are now Present  Septal infarct is now Present  See ED provider note for full interpretation and clinical correlation  Confirmed by Karen Thibodeaux ([id number]) on 3/10/2024 12:39:54 " PM      Meds  Scheduled medications  apixaban, 10 mg, oral, BID   Followed by  apixaban, 5 mg, oral, BID  atorvastatin, 80 mg, oral, Nightly  buprenorphine-naloxone, 1 tablet, sublingual, BID  carbidopa-levodopa, 1 tablet, oral, TID  docusate sodium, 100 mg, oral, BID  escitalopram, 20 mg, oral, Daily  fluticasone furoate-vilanteroL, 1 puff, inhalation, Daily  folic acid, 1 mg, oral, Daily  gabapentin, 300 mg, oral, TID  LORazepam, 0.5 mg, oral, q6h MASON  nicotine, 1 patch, transdermal, Daily  pantoprazole, 40 mg, intravenous, Daily  potassium chloride, 20 mEq, oral, Daily  potassium chloride CR, 20 mEq, oral, Once  predniSONE, 10 mg, oral, Daily  thiamine, 100 mg, oral, Daily  tiotropium, 2 puff, inhalation, Daily  [Held by provider] torsemide, 10 mg, oral, Daily  [Held by provider] traZODone, 100 mg, oral, Nightly      Continuous medications     PRN medications  PRN medications: acetaminophen, albuterol, [Held by provider] busPIRone, oxygen, oxygen, QUEtiapine     Assessment and Plan    Chris Wu is a 68 y.o. male with pertinent hx of COPD, Parkinson's disorder, history of factor V Leiden mutation with DVT and PE in the past & appears to not be on anticoagulation currently and is s/p IVC filter, depression/anxiety & PTSD & diastolic CHF who was admitted to the hospital secondary to acute encephalopathy.     Altered mental status/encephalopathy  Likely due to  polypharmacy.  CTA of head and neck reveals no evidence of hemodynamically significant stenosis of the cervical vessels with mild arteriosclerotic narrowing of bilateral carotid bulbs and no evidence of significant stenosis of the large branch vessel cutoffs of the intracranial vessels  CT of the head showed no acute findings  CT of C-spine showed no acute findings  CT of chest/abdomen/pelvis w/ 6 mm right upper lobe nodule  CT of thoracic and lumbar spine showed no evidence of injury  Pelvic x-ray showed pelvic ring intact and no acute fractures  MRI  was canceled as patient initially could not answer questions however patient has significantly improved with medication estimates  Neurology evaluated the patient as well.  I spoke with patient's daughter, Orin, on the phone on 3/6, and updated her on patient's condition. She has been in contact with her father over the past 6 months, but had not been in contact with him for several years prior, and she states that she felt patient likely had some cognitive decline at least to some degree.  I discussed with her that I am speculating if patient has some underlying dementia, that may be undiagnosed, low with underlying psychiatric issues, along with recent medication changes causing his symptoms.    I reviewed records from [facility] from office visit on 12/28/2023.  He has a diagnosis of generalized anxiety disorder which they were treating him for at [facility].  -Psychiatry consulted and I discussed the case with Dr. Ramirez on 3/11.  We have been tapering down Ativan over the past few days and will decrease Ativan to 0.5 mg every 6 hours today.     -Today, 3/11, patient is alert and oriented to self, birthday, president and year, although he does sleep a lot and does not completely appear to be fully cognizant of everything.  -Continue to monitor and down titrate Ativan as stated above I have decreased his prednisone to 10 mg daily.     Polypharmacy  In review of chart patient is on multiple sedative medications including Seroquel, oxazepam, Vistaril, gabapentin, Suboxone, baclofen, trazodone, Lexapro and BuSpar  The patient's cousin and friend who also cares for the patient reported patient's mentation has significantly worsened over the past month when his medications were adjusted however cannot specify which medication was new  Decreased home medication of baclofen from 10 mg 3 times daily to 5 mg 3 times daily  Cont home medication of Suboxone 8/2 1 tab sublingual BID  Blood pressure has been controlled,  "will continue to hold clonidine 0.1 mg twice daily  Continue escitalopram 20mg every day   Decreased gabapentin 600 mg 3 times daily to 300 mg 3 times daily  -Patient on Suboxone chronically as well.  -Continue Seroquel 25 mg QID PRN, which is a decrease from his home dose of 50 mg 4 times daily as needed  -Trazdoone is on hold.   -Patient apparently had been on oxazepam 30 mg 4 times daily.  Changed to Ativan 2 mg 4 times daily here.  As stated above, tapering down on Ativan and will decrease to 0.5 mg every 6 hours today, 3/11.  Consider reducing further if possible.    -Monitor.       Left lower extremity DVT on ultrasound 3/4/2024 with history of factor V Leiden mutation with prior DVT and PE in the past status post IVC filter placement in the past  -Continue Eliquis, which was started on 3/4/2024.     6 mm right upper lobe nodule  -Follow-up with pulmonology and primary care provider as an outpatient for further evaluation management.       Paroxysmal respiratory failure due to COPD  Patient requires oxygen at 3 L a minute at baseline  Continue LABA  -As it appears patient has been on prednisone recently, unclear if he has been on it long enough as an outpatient to cause adrenal suppression.  So, started tapering down to 10 mg daily on 3/9, as he was on 20 mg daily.    -Monitor.     Hypokalemia  -Replaced.   -Monitor.     Inability to care for self  PT OT and social work consulted     Parkinson disease  Continue carbidopa levodopa     Depression ADD/PTSD  Lexapro continue  Holding BuSpar, trazodone due to concerns for polypharmacy     GERD  Continue PPI  DVT prophylaxis Lovenox and SCDs     Per Dr. Wilcox's note: \"I met with the patient's cousin who called 911 as well as his caretaker/friend today  They both reported a significant decrease in his mentation over the past month after a medication change but could not dictate the medication change  The patient's friend who is very involved in his care reports " "he feels the patient would be safe to return home if his mental status returns to that what it was a month ago\"  PT and OT have evaluated and recommend placement in a skilled nursing facility  Patient is agreeable to placement in a skilled nursing facility, but will need family member or POA to help make the decision for him due to his confusion.    Disposition:  -Plan to discharge to SNF once encephalopathy has further improved and down titrating medications as stated above.  Patient has a one-to-one sitter for safety of the patient.                      "

## 2024-03-12 LAB
ALBUMIN SERPL BCP-MCNC: 3.6 G/DL (ref 3.4–5)
ALP SERPL-CCNC: 57 U/L (ref 33–136)
ALT SERPL W P-5'-P-CCNC: 6 U/L (ref 10–52)
ANION GAP SERPL CALC-SCNC: 11 MMOL/L (ref 10–20)
AST SERPL W P-5'-P-CCNC: 14 U/L (ref 9–39)
BILIRUB SERPL-MCNC: 0.6 MG/DL (ref 0–1.2)
BUN SERPL-MCNC: 13 MG/DL (ref 6–23)
CALCIUM SERPL-MCNC: 8.4 MG/DL (ref 8.6–10.3)
CHLORIDE SERPL-SCNC: 95 MMOL/L (ref 98–107)
CO2 SERPL-SCNC: 36 MMOL/L (ref 21–32)
CREAT SERPL-MCNC: 0.69 MG/DL (ref 0.5–1.3)
EGFRCR SERPLBLD CKD-EPI 2021: >90 ML/MIN/1.73M*2
ERYTHROCYTE [DISTWIDTH] IN BLOOD BY AUTOMATED COUNT: 12.3 % (ref 11.5–14.5)
GLUCOSE SERPL-MCNC: 85 MG/DL (ref 74–99)
HCT VFR BLD AUTO: 41.2 % (ref 41–52)
HGB BLD-MCNC: 12.9 G/DL (ref 13.5–17.5)
MAGNESIUM SERPL-MCNC: 1.96 MG/DL (ref 1.6–2.4)
MCH RBC QN AUTO: 28.5 PG (ref 26–34)
MCHC RBC AUTO-ENTMCNC: 31.3 G/DL (ref 32–36)
MCV RBC AUTO: 91 FL (ref 80–100)
NRBC BLD-RTO: 0 /100 WBCS (ref 0–0)
PHOSPHATE SERPL-MCNC: 3.4 MG/DL (ref 2.5–4.9)
PLATELET # BLD AUTO: 191 X10*3/UL (ref 150–450)
POTASSIUM SERPL-SCNC: 3.7 MMOL/L (ref 3.5–5.3)
PROT SERPL-MCNC: 6.5 G/DL (ref 6.4–8.2)
RBC # BLD AUTO: 4.52 X10*6/UL (ref 4.5–5.9)
SODIUM SERPL-SCNC: 138 MMOL/L (ref 136–145)
WBC # BLD AUTO: 5.6 X10*3/UL (ref 4.4–11.3)

## 2024-03-12 PROCEDURE — 2500000002 HC RX 250 W HCPCS SELF ADMINISTERED DRUGS (ALT 637 FOR MEDICARE OP, ALT 636 FOR OP/ED): Performed by: INTERNAL MEDICINE

## 2024-03-12 PROCEDURE — 85027 COMPLETE CBC AUTOMATED: CPT | Performed by: INTERNAL MEDICINE

## 2024-03-12 PROCEDURE — 2500000002 HC RX 250 W HCPCS SELF ADMINISTERED DRUGS (ALT 637 FOR MEDICARE OP, ALT 636 FOR OP/ED): Performed by: FAMILY MEDICINE

## 2024-03-12 PROCEDURE — 83735 ASSAY OF MAGNESIUM: CPT | Performed by: INTERNAL MEDICINE

## 2024-03-12 PROCEDURE — 36415 COLL VENOUS BLD VENIPUNCTURE: CPT | Performed by: INTERNAL MEDICINE

## 2024-03-12 PROCEDURE — 1200000002 HC GENERAL ROOM WITH TELEMETRY DAILY

## 2024-03-12 PROCEDURE — 2500000001 HC RX 250 WO HCPCS SELF ADMINISTERED DRUGS (ALT 637 FOR MEDICARE OP): Performed by: FAMILY MEDICINE

## 2024-03-12 PROCEDURE — 97116 GAIT TRAINING THERAPY: CPT | Mod: GP,CQ

## 2024-03-12 PROCEDURE — 80053 COMPREHEN METABOLIC PANEL: CPT | Performed by: INTERNAL MEDICINE

## 2024-03-12 PROCEDURE — 2500000001 HC RX 250 WO HCPCS SELF ADMINISTERED DRUGS (ALT 637 FOR MEDICARE OP): Performed by: INTERNAL MEDICINE

## 2024-03-12 PROCEDURE — 2500000004 HC RX 250 GENERAL PHARMACY W/ HCPCS (ALT 636 FOR OP/ED): Performed by: INTERNAL MEDICINE

## 2024-03-12 PROCEDURE — 97530 THERAPEUTIC ACTIVITIES: CPT | Mod: GP,CQ

## 2024-03-12 PROCEDURE — C9113 INJ PANTOPRAZOLE SODIUM, VIA: HCPCS | Performed by: NURSE PRACTITIONER

## 2024-03-12 PROCEDURE — 99233 SBSQ HOSP IP/OBS HIGH 50: CPT | Performed by: STUDENT IN AN ORGANIZED HEALTH CARE EDUCATION/TRAINING PROGRAM

## 2024-03-12 PROCEDURE — 2500000004 HC RX 250 GENERAL PHARMACY W/ HCPCS (ALT 636 FOR OP/ED): Performed by: NURSE PRACTITIONER

## 2024-03-12 PROCEDURE — S4991 NICOTINE PATCH NONLEGEND: HCPCS | Performed by: FAMILY MEDICINE

## 2024-03-12 PROCEDURE — 2500000001 HC RX 250 WO HCPCS SELF ADMINISTERED DRUGS (ALT 637 FOR MEDICARE OP)

## 2024-03-12 PROCEDURE — 2500000001 HC RX 250 WO HCPCS SELF ADMINISTERED DRUGS (ALT 637 FOR MEDICARE OP): Performed by: NURSE PRACTITIONER

## 2024-03-12 PROCEDURE — 84100 ASSAY OF PHOSPHORUS: CPT | Performed by: INTERNAL MEDICINE

## 2024-03-12 RX ORDER — LORAZEPAM 0.5 MG/1
0.5 TABLET ORAL EVERY 8 HOURS SCHEDULED
Status: DISCONTINUED | OUTPATIENT
Start: 2024-03-12 | End: 2024-03-18 | Stop reason: HOSPADM

## 2024-03-12 RX ADMIN — LORAZEPAM 0.5 MG: 0.5 TABLET ORAL at 21:00

## 2024-03-12 RX ADMIN — APIXABAN 5 MG: 5 TABLET, FILM COATED ORAL at 20:59

## 2024-03-12 RX ADMIN — FLUTICASONE FUROATE AND VILANTEROL TRIFENATATE 1 PUFF: 200; 25 POWDER RESPIRATORY (INHALATION) at 09:38

## 2024-03-12 RX ADMIN — LORAZEPAM 0.5 MG: 0.5 TABLET ORAL at 09:49

## 2024-03-12 RX ADMIN — GABAPENTIN 300 MG: 300 CAPSULE ORAL at 14:08

## 2024-03-12 RX ADMIN — TIOTROPIUM BROMIDE INHALATION SPRAY 2 PUFF: 3.12 SPRAY, METERED RESPIRATORY (INHALATION) at 09:38

## 2024-03-12 RX ADMIN — NICOTINE 1 PATCH: 21 PATCH, EXTENDED RELEASE TRANSDERMAL at 09:00

## 2024-03-12 RX ADMIN — GABAPENTIN 300 MG: 300 CAPSULE ORAL at 09:36

## 2024-03-12 RX ADMIN — CARBIDOPA AND LEVODOPA 1 TABLET: 25; 100 TABLET ORAL at 14:07

## 2024-03-12 RX ADMIN — CARBIDOPA AND LEVODOPA 1 TABLET: 25; 100 TABLET ORAL at 20:59

## 2024-03-12 RX ADMIN — POTASSIUM CHLORIDE 20 MEQ: 1.5 FOR SOLUTION ORAL at 09:00

## 2024-03-12 RX ADMIN — BUPRENORPHINE AND NALOXONE 1 TABLET: 8; 2 TABLET SUBLINGUAL at 20:58

## 2024-03-12 RX ADMIN — DOCUSATE SODIUM 100 MG: 100 CAPSULE, LIQUID FILLED ORAL at 09:45

## 2024-03-12 RX ADMIN — FOLIC ACID 1 MG: 1 TABLET ORAL at 09:35

## 2024-03-12 RX ADMIN — BUPRENORPHINE AND NALOXONE 1 TABLET: 8; 2 TABLET SUBLINGUAL at 09:49

## 2024-03-12 RX ADMIN — ESCITALOPRAM OXALATE 20 MG: 20 TABLET ORAL at 09:35

## 2024-03-12 RX ADMIN — PANTOPRAZOLE SODIUM 40 MG: 40 INJECTION, POWDER, FOR SOLUTION INTRAVENOUS at 09:35

## 2024-03-12 RX ADMIN — GABAPENTIN 300 MG: 300 CAPSULE ORAL at 20:58

## 2024-03-12 RX ADMIN — DOCUSATE SODIUM 100 MG: 100 CAPSULE, LIQUID FILLED ORAL at 20:59

## 2024-03-12 RX ADMIN — THIAMINE HCL TAB 100 MG 100 MG: 100 TAB at 09:36

## 2024-03-12 RX ADMIN — CARBIDOPA AND LEVODOPA 1 TABLET: 25; 100 TABLET ORAL at 09:36

## 2024-03-12 RX ADMIN — LORAZEPAM 0.5 MG: 0.5 TABLET ORAL at 14:07

## 2024-03-12 RX ADMIN — APIXABAN 5 MG: 5 TABLET, FILM COATED ORAL at 09:00

## 2024-03-12 RX ADMIN — LORAZEPAM 0.5 MG: 0.5 TABLET ORAL at 03:09

## 2024-03-12 RX ADMIN — PREDNISONE 10 MG: 10 TABLET ORAL at 09:36

## 2024-03-12 RX ADMIN — ATORVASTATIN CALCIUM 80 MG: 80 TABLET, FILM COATED ORAL at 20:58

## 2024-03-12 ASSESSMENT — COGNITIVE AND FUNCTIONAL STATUS - GENERAL
TURNING FROM BACK TO SIDE WHILE IN FLAT BAD: A LOT
MOVING TO AND FROM BED TO CHAIR: A LOT
MOVING FROM LYING ON BACK TO SITTING ON SIDE OF FLAT BED WITH BEDRAILS: A LITTLE
CLIMB 3 TO 5 STEPS WITH RAILING: A LOT
MOBILITY SCORE: 13
WALKING IN HOSPITAL ROOM: A LOT
STANDING UP FROM CHAIR USING ARMS: A LOT

## 2024-03-12 ASSESSMENT — PAIN SCALES - GENERAL
PAINLEVEL_OUTOF10: 0 - NO PAIN
PAINLEVEL_OUTOF10: 0 - NO PAIN

## 2024-03-12 ASSESSMENT — PAIN - FUNCTIONAL ASSESSMENT
PAIN_FUNCTIONAL_ASSESSMENT: 0-10
PAIN_FUNCTIONAL_ASSESSMENT: 0-10

## 2024-03-12 NOTE — PROGRESS NOTES
03/12/24 1426   Discharge Planning   Patient expects to be discharged to: Called to [facility], attempting to talk to pt's [facility] , but have not connected yet.  I will start Medicaid appliction once I know pt's monthly income which is needed to file the appliction.  Pt still confused today, thinks he is currently in a barn.

## 2024-03-12 NOTE — PROGRESS NOTES
Chris Wu is a 68 y.o. male on day 11 of admission presenting with Encephalopathy.      Subjective   Transition of care, hospital course reviewed.     Pt is answering intermittent questions.   Appears tired and remains confused   Denies CP, abd pain, SOB       Objective     Last Recorded Vitals  /82 (BP Location: Left arm, Patient Position: Lying)   Pulse (!) 116   Temp 36.5 °C (97.7 °F) (Temporal)   Resp 18   Wt 76 kg (167 lb 8.8 oz)   SpO2 95%   Intake/Output last 3 Shifts:    Intake/Output Summary (Last 24 hours) at 3/12/2024 1759  Last data filed at 3/12/2024 1300  Gross per 24 hour   Intake 120 ml   Output --   Net 120 ml       Admission Weight  Weight: 72.6 kg (160 lb) (03/01/24 1533)    Daily Weight  03/11/24 : 76 kg (167 lb 8.8 oz)    Image Results  ECG 12 lead  Sinus rhythm with Premature atrial complexes  Left axis deviation  Septal infarct , age undetermined  Abnormal ECG  When compared with ECG of 02-MAY-2022 00:06,  Premature atrial complexes are now Present  Septal infarct is now Present  See ED provider note for full interpretation and clinical correlation  Confirmed by Karen Thibodeaux ([id number]) on 3/10/2024 12:39:54 PM      Physical Exam    Constitutional: resting comfortably in chair, no acute distress   Eyes: clear sclera, intermittently closing eyes during assessment   Respiratory/Thorax: CTA bilaterally, no acute respiratory distress  Cardiovascular: regular rate and rhythm  Gastrointestinal: Nondistended, soft, non-tender  Musculoskeletal: no significant peripheral edema appreciated   Neurological: tired appearing, disoriented to event, hospital, and year. Oriented to self only, answering questions intermittently    Psychological: calm  Skin: Warm and dry    Relevant Results  Scheduled medications  apixaban, 5 mg, oral, BID  atorvastatin, 80 mg, oral, Nightly  buprenorphine-naloxone, 1 tablet, sublingual, BID  carbidopa-levodopa, 1 tablet, oral, TID  docusate sodium, 100 mg,  oral, BID  escitalopram, 20 mg, oral, Daily  fluticasone furoate-vilanteroL, 1 puff, inhalation, Daily  folic acid, 1 mg, oral, Daily  gabapentin, 300 mg, oral, TID  LORazepam, 0.5 mg, oral, q8h MASON  nicotine, 1 patch, transdermal, Daily  pantoprazole, 40 mg, intravenous, Daily  potassium chloride, 20 mEq, oral, Daily  potassium chloride CR, 20 mEq, oral, Once  predniSONE, 10 mg, oral, Daily  thiamine, 100 mg, oral, Daily  tiotropium, 2 puff, inhalation, Daily  [Held by provider] torsemide, 10 mg, oral, Daily  [Held by provider] traZODone, 100 mg, oral, Nightly      ECG 12 lead    Result Date: 3/10/2024  Sinus rhythm with Premature atrial complexes Left axis deviation Septal infarct , age undetermined Abnormal ECG When compared with ECG of 02-MAY-2022 00:06, Premature atrial complexes are now Present Septal infarct is now Present See ED provider note for full interpretation and clinical correlation Confirmed by Karen Thibodeaux ([id number]) on 3/10/2024 12:39:54 PM    Lower extremity venous duplex bilateral    Result Date: 3/4/2024  Interpreted By:  Alexx Ellsworth, STUDY: [facility] LOWER EXTREMITY VENOUS DUPLEX BILATERAL  3/4/2024 1:27 pm   INDICATION: Elevated D-dimer.   COMPARISON: 03/02/2024.   ACCESSION NUMBER(S): [id number]   ORDERING CLINICIAN: ANTWON RIOS   TECHNIQUE: Routine ultrasound of the  bilateral lower extremity was performed with duplex Doppler (color and spectral) evaluation.  Static images were obtained for remote interpretation.   FINDINGS: RIGHT LEG: The  right common femoral, femoral, popliteal, proximal medial saphenous, and deep femoral veins are patent and free of thrombus. The veins are normally compressible.  They demonstrate normal phasic flow and augmentation response.   Visualized segments of the right calf posterior tibial and peroneal vein segments appear patent and compressible without evidence of thrombus.   LEFT LEG: Study is positive for left lower extremity DVT. The left  common femoral vein is partially compressible containing nonocclusive thrombus. The left femoral vein is noncompressible containing thrombus, portions of which appear occlusive or near occlusive in the mid-distal segments. The left popliteal vein is incompletely compressible containing nonocclusive thrombus.   Thrombus is also evident within the left calf vein posterior tibial vein and peroneal vein segments.       POSITIVE FOR LEFT LOWER EXTREMITY DVT. Thrombus demonstrated in the left common femoral vein, femoral vein, and popliteal vein as well as the posterior tibial and peroneal vein segments of the left calf. Some areas of thrombus appear occlusive or near occlusive in these regions.   No evidence of right lower extremity DVT.   MACRO: Alexx Ellsworth discussed the significance and urgency of this critical finding via secure chat with  ANTWON RIOS on 3/4/2024 at 4:21 pm.  (**-RCF-**) Findings:  See findings.   Signed by: Alexx Ellsworth 3/4/2024 4:22 PM Dictation workstation:   [id number]    Lower extremity venous duplex bilateral    Result Date: 3/2/2024  Interpreted By:  Blu Xiong, STUDY: [facility] LOWER EXTREMITY VENOUS DUPLEX BILATERAL  3/2/2024 7:48 pm   INDICATION: 67 y/o   M with  Signs/Symptoms:r/o DVT. LMP:  Unknown.   COMPARISON: None.   ACCESSION NUMBER(S): [id number]   ORDERING CLINICIAN: SUGEY HURTADO   TECHNIQUE: Routine ultrasound of the  right lower extremity was performed with duplex Doppler (color and spectral) evaluation.   Static images were obtained for remote interpretation.   FINDINGS: Examination significantly limited due to patient condition.   Patency demonstrated from the left femoral vein proximally down to below the knee.         Limited examination due to patient condition. No DVT detected   MACRO: None   Signed by: Blu Xiong 3/2/2024 8:18 PM Dictation workstation:   [id number]    CT angio head and neck w and wo IV contrast    Result Date: 3/1/2024  Interpreted  By:  Johnathon Higuera, STUDY: CT ANGIO HEAD AND NECK W AND WO IV CONTRAST;  3/1/2024 11:21 pm   INDICATION: Signs/Symptoms:R/O STROKE.   COMPARISON: Correlation made to noncontrast head CT of 03/01/2024.   ACCESSION NUMBER(S): [id number]   ORDERING CLINICIAN: SUGEY HURTADO   TECHNIQUE: Unenhanced CT images of the head were obtained. Subsequently, 75 cc Omnipaque 350 were administered intravenously and axial images of the head and neck were acquired.  Coronal, sagittal, and 3-D reconstructions were provided for review.   FINDINGS: Noncontrast head CT: Moderate to advanced volume loss.  There is periventricular and subcortical white matter hypoattenuation, most in keeping with chronic microvascular ischemic change. Gray-white matter differentiation is maintained. No evidence of acute intracranial hemorrhage. No mass, mass effect, midline shift, hydrocephalus, or abnormal extra-axial collection. Paranasal sinuses and mastoids are clear. Skull is within normal limits. Visible extracranial soft tissues including the orbits appear within normal limits.   CTA HEAD FINDINGS:   Anterior circulation: The bilateral intracranial internal carotid arteries, bilateral carotid terminals, bilateral proximal anterior and middle cerebral arteries are normal.   Posterior circulation: Bilateral intracranial vertebral arteries, vertebrobasilar junction, basilar artery and proximal posterior cerebral arteries are normal.   No intracranial saccular aneurysm or other abnormal intracranial enhancement is seen.   CTA NECK FINDINGS:   Right carotid vessels: Mild partially calcified plaque in the common carotid artery without significant luminal narrowing. Mild-to-moderate plaque in the bifurcation without significant luminal narrowing. Moderate to severe plaque in the carotid bulb causing narrowing of between 20 and 30% by NASCET criteria. The more distal cervical ICA is of normal caliber without hemodynamically significant luminal narrowing.    Left carotid vessels: Mild partially calcified plaque in the common carotid artery without significant luminal narrowing. Mild-to-moderate plaque in the bifurcation without significant luminal narrowing. Moderate to severe plaque in the carotid bulb causing narrowing of between 20 and 30% by NASCET criteria. The more distal cervical ICA is of normal caliber without hemodynamically significant luminal narrowing.   Vertebral vessels: Mild calcific plaque at the vertebral artery origins without significant narrowing. The visualized segments of the cervical vertebral arteries are normal in caliber. Right vertebral artery is dominant.   ACDF changes noted. Severe emphysema. Biapical pleuroparenchymal scarring.       No evidence of acute intracranial hemorrhage or cortical infarct on noncontrast CT.   No evidence for hemodynamically significant stenosis of the cervical vessels. Mild atherosclerotic narrowing in the bilateral carotid bulbs measuring between 20 and 30% by NASCET criteria.   No evidence for significant stenosis or large branch vessel cutoffs of the intracranial vessels.   Incidentally noted severe emphysema in the visible lungs.   MACRO: None   Signed by: Johnathon Higuera 3/1/2024 11:46 PM Dictation workstation:   [id number]    CT thoracic spine wo IV contrast    Result Date: 3/1/2024  STUDY: CT Chest, Abdomen, and Pelvis with IV Contrast, CT Reconstruction Thoracic Spine and Lumbar Spine; 3/1/2024 at 5:58 p.m. INDICATION: Possible fall.  Altered mental status. COMPARISON: Two-view CXR 1/23/2024.  CT chest 8/1/2022.  US gallbladder 8/27/2020. ACCESSION NUMBER(S): [id number], [id number], [id number] ORDERING CLINICIAN: HUNTER ABBASI TECHNIQUE: CT of the chest, abdomen, and pelvis was performed.  Contiguous axial images were obtained at 3 mm slice thickness through the chest, abdomen, and pelvis.  Coronal and sagittal reconstructions at 3 mm slice thickness were performed.  Omnipaque 350 75 mL was  administered intravenously.  Please note that spinal images were generated from the original CT abdomen and pelvis imaging. FINDINGS: CHEST: MEDIASTINUM: The heart is normal in size without pericardial effusion.  Central vascular structures opacify normally.  LUNGS/PLEURA: There is no pleural effusion, pleural thickening, or pneumothorax. The airways are patent. There is a 6 mm nodule in the right upper lobe. This was present on the previous examination and is unchanged. Lungs are clear without consolidation, interstitial disease, or suspicious nodules. LYMPH NODES: Thoracic lymph nodes are not enlarged. ABDOMEN:  LIVER: No hepatomegaly.  Smooth surface contour.  Normal attenuation.  BILE DUCTS: No intrahepatic or extrahepatic biliary ductal dilatation.  GALLBLADDER: The gallbladder is unremarkable. STOMACH: No abnormalities identified.  PANCREAS: No masses or ductal dilatation.  SPLEEN: No splenomegaly or focal splenic lesion.  ADRENAL GLANDS: No thickening or nodules.  KIDNEYS AND URETERS: Kidneys are normal in size and location.  No renal or ureteral calculi.  PELVIS:  BLADDER: No abnormalities identified.  REPRODUCTIVE ORGANS: No abnormalities identified.  BOWEL: No abnormalities identified.  VESSELS: No abnormalities identified.  Abdominal aorta is normal in caliber.  PERITONEUM/RETROPERITONEUM/LYMPH NODES: No free fluid.  No pneumoperitoneum. No lymphadenopathy.  ABDOMINAL WALL: No abnormalities identified. SOFT TISSUES: No abnormalities identified.  BONES: No acute fracture or aggressive osseous lesion. THORACIC SPINE: The alignment is anatomic.  There is no fracture or traumatic subluxation. The vertebral body heights are well maintained.  Disc spaces are preserved.  No significant central canal stenosis is demonstrated. The neural foramina are patent throughout. There is mild degenerative change. The paravertebral soft tissues are within normal limits. LUMBAR SPINE: There is mild degenerative scoliosis as  well as mild anterolisthesis at L4-5. The spine is in otherwise good alignment.  There is no fracture or traumatic subluxation. The vertebral body heights are well maintained.  There is mild disc space during throughout the lumbar spine.  No significant central canal stenosis is demonstrated.  The right L4-5 neural foramen is slightly narrowed.  The paravertebral soft tissues are within normal limits.    1. No evidence for acute injury to the chest, abdomen, pelvis, thoracic, or lumbar spine. 2. There is degenerative change in the lumbar spine as described. 3. There is a stable 6 mm nodule in the right upper lobe. Signed by Robert Oquendo MD    CT lumbar spine wo IV contrast    Result Date: 3/1/2024  STUDY: CT Chest, Abdomen, and Pelvis with IV Contrast, CT Reconstruction Thoracic Spine and Lumbar Spine; 3/1/2024 at 5:58 p.m. INDICATION: Possible fall.  Altered mental status. COMPARISON: Two-view CXR 1/23/2024.  CT chest 8/1/2022.  US gallbladder 8/27/2020. ACCESSION NUMBER(S): [id number], [id number], [id number] ORDERING CLINICIAN: HUNTER ABBASI TECHNIQUE: CT of the chest, abdomen, and pelvis was performed.  Contiguous axial images were obtained at 3 mm slice thickness through the chest, abdomen, and pelvis.  Coronal and sagittal reconstructions at 3 mm slice thickness were performed.  Omnipaque 350 75 mL was administered intravenously.  Please note that spinal images were generated from the original CT abdomen and pelvis imaging. FINDINGS: CHEST: MEDIASTINUM: The heart is normal in size without pericardial effusion.  Central vascular structures opacify normally.  LUNGS/PLEURA: There is no pleural effusion, pleural thickening, or pneumothorax. The airways are patent. There is a 6 mm nodule in the right upper lobe. This was present on the previous examination and is unchanged. Lungs are clear without consolidation, interstitial disease, or suspicious nodules. LYMPH NODES: Thoracic lymph nodes are not  enlarged. ABDOMEN:  LIVER: No hepatomegaly.  Smooth surface contour.  Normal attenuation.  BILE DUCTS: No intrahepatic or extrahepatic biliary ductal dilatation.  GALLBLADDER: The gallbladder is unremarkable. STOMACH: No abnormalities identified.  PANCREAS: No masses or ductal dilatation.  SPLEEN: No splenomegaly or focal splenic lesion.  ADRENAL GLANDS: No thickening or nodules.  KIDNEYS AND URETERS: Kidneys are normal in size and location.  No renal or ureteral calculi.  PELVIS:  BLADDER: No abnormalities identified.  REPRODUCTIVE ORGANS: No abnormalities identified.  BOWEL: No abnormalities identified.  VESSELS: No abnormalities identified.  Abdominal aorta is normal in caliber.  PERITONEUM/RETROPERITONEUM/LYMPH NODES: No free fluid.  No pneumoperitoneum. No lymphadenopathy.  ABDOMINAL WALL: No abnormalities identified. SOFT TISSUES: No abnormalities identified.  BONES: No acute fracture or aggressive osseous lesion. THORACIC SPINE: The alignment is anatomic.  There is no fracture or traumatic subluxation. The vertebral body heights are well maintained.  Disc spaces are preserved.  No significant central canal stenosis is demonstrated. The neural foramina are patent throughout. There is mild degenerative change. The paravertebral soft tissues are within normal limits. LUMBAR SPINE: There is mild degenerative scoliosis as well as mild anterolisthesis at L4-5. The spine is in otherwise good alignment.  There is no fracture or traumatic subluxation. The vertebral body heights are well maintained.  There is mild disc space during throughout the lumbar spine.  No significant central canal stenosis is demonstrated.  The right L4-5 neural foramen is slightly narrowed.  The paravertebral soft tissues are within normal limits.    1. No evidence for acute injury to the chest, abdomen, pelvis, thoracic, or lumbar spine. 2. There is degenerative change in the lumbar spine as described. 3. There is a stable 6 mm nodule in the  right upper lobe. Signed by Robert Oquendo MD    CT chest abdomen pelvis w IV contrast    Result Date: 3/1/2024  STUDY: CT Chest, Abdomen, and Pelvis with IV Contrast, CT Reconstruction Thoracic Spine and Lumbar Spine; 3/1/2024 at 5:58 p.m. INDICATION: Possible fall.  Altered mental status. COMPARISON: Two-view CXR 1/23/2024.  CT chest 8/1/2022.  US gallbladder 8/27/2020. ACCESSION NUMBER(S): [id number], [id number], [id number] ORDERING CLINICIAN: HUNTER ABBASI TECHNIQUE: CT of the chest, abdomen, and pelvis was performed.  Contiguous axial images were obtained at 3 mm slice thickness through the chest, abdomen, and pelvis.  Coronal and sagittal reconstructions at 3 mm slice thickness were performed.  Omnipaque 350 75 mL was administered intravenously.  Please note that spinal images were generated from the original CT abdomen and pelvis imaging. FINDINGS: CHEST: MEDIASTINUM: The heart is normal in size without pericardial effusion.  Central vascular structures opacify normally.  LUNGS/PLEURA: There is no pleural effusion, pleural thickening, or pneumothorax. The airways are patent. There is a 6 mm nodule in the right upper lobe. This was present on the previous examination and is unchanged. Lungs are clear without consolidation, interstitial disease, or suspicious nodules. LYMPH NODES: Thoracic lymph nodes are not enlarged. ABDOMEN:  LIVER: No hepatomegaly.  Smooth surface contour.  Normal attenuation.  BILE DUCTS: No intrahepatic or extrahepatic biliary ductal dilatation.  GALLBLADDER: The gallbladder is unremarkable. STOMACH: No abnormalities identified.  PANCREAS: No masses or ductal dilatation.  SPLEEN: No splenomegaly or focal splenic lesion.  ADRENAL GLANDS: No thickening or nodules.  KIDNEYS AND URETERS: Kidneys are normal in size and location.  No renal or ureteral calculi.  PELVIS:  BLADDER: No abnormalities identified.  REPRODUCTIVE ORGANS: No abnormalities identified.  BOWEL: No abnormalities  identified.  VESSELS: No abnormalities identified.  Abdominal aorta is normal in caliber.  PERITONEUM/RETROPERITONEUM/LYMPH NODES: No free fluid.  No pneumoperitoneum. No lymphadenopathy.  ABDOMINAL WALL: No abnormalities identified. SOFT TISSUES: No abnormalities identified.  BONES: No acute fracture or aggressive osseous lesion. THORACIC SPINE: The alignment is anatomic.  There is no fracture or traumatic subluxation. The vertebral body heights are well maintained.  Disc spaces are preserved.  No significant central canal stenosis is demonstrated. The neural foramina are patent throughout. There is mild degenerative change. The paravertebral soft tissues are within normal limits. LUMBAR SPINE: There is mild degenerative scoliosis as well as mild anterolisthesis at L4-5. The spine is in otherwise good alignment.  There is no fracture or traumatic subluxation. The vertebral body heights are well maintained.  There is mild disc space during throughout the lumbar spine.  No significant central canal stenosis is demonstrated.  The right L4-5 neural foramen is slightly narrowed.  The paravertebral soft tissues are within normal limits.    1. No evidence for acute injury to the chest, abdomen, pelvis, thoracic, or lumbar spine. 2. There is degenerative change in the lumbar spine as described. 3. There is a stable 6 mm nodule in the right upper lobe. Signed by Robert Ouqendo MD    XR chest 1 view    Result Date: 3/1/2024  STUDY: Chest Radiograph;  3/1/24 at 5:47 PM INDICATION: Altered mental status. COMPARISON: Chest XR 1/23/24. ACCESSION NUMBER(S): [id number] ORDERING CLINICIAN: HUNTER MATUTE WIRE TECHNIQUE:  Frontal chest was obtained at 1746 hours. (two images) FINDINGS: CARDIOMEDIASTINAL SILHOUETTE: Cardiomediastinal silhouette is normal in size and configuration.  LUNGS: Lungs are clear.  ABDOMEN: No remarkable upper abdominal findings.  BONES: No acute osseous changes.    No acute pulmonary pathology. Signed by  Robert Oquendo MD    XR pelvis 1-2 views    Result Date: 3/1/2024  STUDY: Pelvis Radiographs; 3/1/24 at 5:47 PM INDICATION: Left hip pain. COMPARISON: Left hip XR 4/30/16. ACCESSION NUMBER(S): [id number] ORDERING CLINICIAN: HUNTER ABBASI TECHNIQUE:  Two view(s) of the pelvis. FINDINGS:  The pelvic ring is intact.  There is no acute fracture. There are degenerative changes lower lumbosacral spine.  There is an inferior vena cava filter present.    No acute osseous abnormalities. Signed by Be Hare MD    CT head wo IV contrast    Result Date: 3/1/2024  Interpreted By:  Johnathon Higuera, STUDY: CT HEAD WO IV CONTRAST; CT CERVICAL SPINE WO IV CONTRAST; ;  3/1/2024 5:38 pm   INDICATION: Signs/Symptoms:ams possible fall.   COMPARISON: Noncontrast CT head of 05/05/2021. CT cervical spine of 06/04/2010.   ACCESSION NUMBER(S): [id number]; [id number]   ORDERING CLINICIAN: HUNTER ABBASI   TECHNIQUE: Noncontrast CT exams of the head and cervical spine with multiplanar reformations.   FINDINGS: BRAIN PARENCHYMA: Moderate to advanced volume loss.  There is periventricular and subcortical white matter hypoattenuation, most in keeping with chronic microvascular ischemic change. Cystic spaces in the bilateral corpus striatum probably representing chronic lacunar infarcts. Gray-white matter interfaces are preserved. No mass, mass effect or midline shift.   HEMORRHAGE: No acute intracranial hemorrhage. VENTRICLES and EXTRA-AXIAL SPACES: Normal size. EXTRACRANIAL SOFT TISSUES: Within normal limits. PARANASAL SINUSES/MASTOIDS: The visualized paranasal sinuses and mastoid air cells are aerated. CALVARIUM: No depressed skull fracture. No destructive osseous lesion.   OTHER FINDINGS: None.   CERVICAL SPINE:   Mild reversal of the normal cervical lordosis could reflect positioning or muscle spasm. ALIGNMENT: New trace degenerative anterolisthesis at C2-C3. Alignment is otherwise normal. VERTEBRAE: Acute fracture. Vertebral stature  is maintained. Redemonstrated ACDF changes at C5-C7. Severe discogenic degeneration with endplate irregularity and osteophytosis and uncovertebral hypertrophy at C3-C4 and C4-C5. Moderate hypertrophic facet osteoarthropathy excepting on the right at C 2 C3 where it is severe in degree. SPINAL CANAL: No critical spinal canal stenosis. Moderate to severe degenerative canal narrowing at C4-C5 secondary to disc osteophyte bulging, similar to before. PREVERTEBRAL SOFT TISSUES: No prevertebral soft tissue swelling. LUNG APICES: Moderate to severe emphysema   OTHER FINDINGS: None.       No evidence of acute intracranial abnormality.   No acute cervical spine fracture or malalignment.     MACRO: None   Signed by: Johnathon Higuera 3/1/2024 6:09 PM Dictation workstation:   [id number]    CT cervical spine wo IV contrast    Result Date: 3/1/2024  Interpreted By:  Johnathon Higuera, STUDY: CT HEAD WO IV CONTRAST; CT CERVICAL SPINE WO IV CONTRAST; ;  3/1/2024 5:38 pm   INDICATION: Signs/Symptoms:ams possible fall.   COMPARISON: Noncontrast CT head of 05/05/2021. CT cervical spine of 06/04/2010.   ACCESSION NUMBER(S): [id number]; [id number]   ORDERING CLINICIAN: HUNTER ABBASI   TECHNIQUE: Noncontrast CT exams of the head and cervical spine with multiplanar reformations.   FINDINGS: BRAIN PARENCHYMA: Moderate to advanced volume loss.  There is periventricular and subcortical white matter hypoattenuation, most in keeping with chronic microvascular ischemic change. Cystic spaces in the bilateral corpus striatum probably representing chronic lacunar infarcts. Gray-white matter interfaces are preserved. No mass, mass effect or midline shift.   HEMORRHAGE: No acute intracranial hemorrhage. VENTRICLES and EXTRA-AXIAL SPACES: Normal size. EXTRACRANIAL SOFT TISSUES: Within normal limits. PARANASAL SINUSES/MASTOIDS: The visualized paranasal sinuses and mastoid air cells are aerated. CALVARIUM: No depressed skull fracture. No destructive osseous  lesion.   OTHER FINDINGS: None.   CERVICAL SPINE:   Mild reversal of the normal cervical lordosis could reflect positioning or muscle spasm. ALIGNMENT: New trace degenerative anterolisthesis at C2-C3. Alignment is otherwise normal. VERTEBRAE: Acute fracture. Vertebral stature is maintained. Redemonstrated ACDF changes at C5-C7. Severe discogenic degeneration with endplate irregularity and osteophytosis and uncovertebral hypertrophy at C3-C4 and C4-C5. Moderate hypertrophic facet osteoarthropathy excepting on the right at C 2 C3 where it is severe in degree. SPINAL CANAL: No critical spinal canal stenosis. Moderate to severe degenerative canal narrowing at C4-C5 secondary to disc osteophyte bulging, similar to before. PREVERTEBRAL SOFT TISSUES: No prevertebral soft tissue swelling. LUNG APICES: Moderate to severe emphysema   OTHER FINDINGS: None.       No evidence of acute intracranial abnormality.   No acute cervical spine fracture or malalignment.     MACRO: None   Signed by: Johnathon Higuera 3/1/2024 6:09 PM Dictation workstation:   [id number]     Continuous medications     PRN medications  PRN medications: acetaminophen, albuterol, oxygen, oxygen, QUEtiapine       Assessment/Plan      Chris Wu is a 68 y.o. male with pertinent hx of COPD, Parkinson's disorder, history of factor V Leiden mutation with DVT and PE in the past not on anticoagulation currently s/p IVC filter, depression/anxiety & PTSD & diastolic CHF who was admitted to the hospital secondary to acute encephalopathy.     Altered mental status/encephalopathy, 2/2 to  polypharmacy  Parkinson disease  Depression ADD/PTSD  ANDREA  Multiple imaging modalities pursued without etiology for altered mental status discovered.    Patient has limited contact with his family although per chart review had recent contact with daughter Orin.  She did note some concern for cognitive decline during the conversations.    Multiple medication adjustments have been  made since arrival with improvement in mentation.  Home medications that may be contributing to altered mentation:  Suboxone daily  BuSpar 20 mg 3 times daily  Sinemet  3 times daily  Lexapro 20 mg daily  Gabapentin 600 mg 3 times daily  Atarax 25 mg twice daily  Ativan 1 mg twice daily as needed for anxiety  Seroquel 50 mg 4 times daily as needed for anxiety  Trazodone 100 mg once daily at bedtime     Current medications:   Suboxone twice daily, dose unchanged  Sinemet twice daily, dose unchanged  Lexapro 20 mg daily, dose unchanged  Gabapentin 300 mg 3 times daily, dose decreased  Atarax discontinued  Lorazepam 0.5 mg every 8 hours, dose decreased (tapering)  Seroquel changed to 25 mg 4 times daily as needed for agitation, dose decreased  Trazodone discontinued    Per [facility] report, oxazepam and lorazepam started at the end of January.  He picked up prescriptions every week with pill count ranging from 28 to 56 pills.  He has been reliably picking up his Suboxone and gabapentin for at least the past couple of years.    Extensive left lower extremity DVT  3/4/24 POSITIVE FOR LEFT LOWER EXTREMITY DVT. Thrombus demonstrated in the left common femoral vein, femoral vein, and popliteal vein as well as the posterior tibial and peroneal vein segments of the left calf. Some areas of thrombus appear occlusive or near occlusive in these regions.      Continue Eliquis for treatment.    Lung nodule, incidental. 6 mm right upper lobe nodule  -Follow-up with pulmonology and primary care provider as an outpatient for further evaluation management.      Chronic hypoxemic respiratory failure  COPD without evidence of exacerbation  Patient has been prescribed prednisone 20 mg daily since May 2023.  Unclear reasoning for this, will reach out to PCP tomorrow to assess why.  Prednisone taper has been started here and he is currently on 10 mg daily.    Hypokalemia, resolved      Inability to care for self  PT OT and social work  consulted      GERD  Continue PPI    DVT prophylaxis eliquis       Disposition: Plan to discharge to SNF once encephalopathy has further improved.  Possible discharge to SNF in the next 72 hours.    Discussed with patient and RN  Time spent coordination of care 60 minutes to include chart review       Chetna Archer DO

## 2024-03-12 NOTE — CARE PLAN
The patient's goals for the shift include  LATANYA    The clinical goals for the shift include patient will remain safe with sitter at bedside    Over the shift, the patient did not make progress toward the following goals. Barriers to progression include confusion, impulsivity, hard of hearing. Recommendations to address these barriers include hearing aids, sitter at bedside, safety reminders.

## 2024-03-12 NOTE — PROGRESS NOTES
"  Subjective   Chris Wu is a 68 y.o. year old male patient who was personally seen.  During interview patient lethargic and would not participate.  Sitter at bedside, reported he did wake up previously and was asking for his vape.  Patients mentation waxing and waning throughout the day.  Patient currently not in restraints.           Objective   Mental Status Exam:   General: Appropriately groomed and dressed in hospital attire, reclined up in bed, unable to be aroused.   Appearance: Appears stated age.   Attitude: Unable to be evaluated.    Behavior: No eye contact.   Motor Activity:  No EPS/TD.  Unable to assess gait and station. Normal muscle tone and bulk.   Speech: Unable to be evaluated   Mood: \"Unable to verbalize   Affect: Neutral.   Thought Process: Unable to be evaluated.    Thought Content: Unable to be evaluated.    Thought Perception: Unable to be evaluated.    Cognition: Unable to be evaluated.    Insight: Unable to be evaluated.     Judgment: Unable to be evaluated.        LABS:  Results for orders placed or performed during the hospital encounter of 03/01/24 (from the past 24 hour(s))   CBC   Result Value Ref Range    WBC 5.6 4.4 - 11.3 x10*3/uL    nRBC 0.0 0.0 - 0.0 /100 WBCs    RBC 4.52 4.50 - 5.90 x10*6/uL    Hemoglobin 12.9 (L) 13.5 - 17.5 g/dL    Hematocrit 41.2 41.0 - 52.0 %    MCV 91 80 - 100 fL    MCH 28.5 26.0 - 34.0 pg    MCHC 31.3 (L) 32.0 - 36.0 g/dL    RDW 12.3 11.5 - 14.5 %    Platelets 191 150 - 450 x10*3/uL   Comprehensive Metabolic Panel   Result Value Ref Range    Glucose 85 74 - 99 mg/dL    Sodium 138 136 - 145 mmol/L    Potassium 3.7 3.5 - 5.3 mmol/L    Chloride 95 (L) 98 - 107 mmol/L    Bicarbonate 36 (H) 21 - 32 mmol/L    Anion Gap 11 10 - 20 mmol/L    Urea Nitrogen 13 6 - 23 mg/dL    Creatinine 0.69 0.50 - 1.30 mg/dL    eGFR >90 >60 mL/min/1.73m*2    Calcium 8.4 (L) 8.6 - 10.3 mg/dL    Albumin 3.6 3.4 - 5.0 g/dL    Alkaline Phosphatase 57 33 - 136 U/L    Total Protein " 6.5 6.4 - 8.2 g/dL    AST 14 9 - 39 U/L    Bilirubin, Total 0.6 0.0 - 1.2 mg/dL    ALT 6 (L) 10 - 52 U/L   Phosphorus   Result Value Ref Range    Phosphorus 3.4 2.5 - 4.9 mg/dL   Magnesium   Result Value Ref Range    Magnesium 1.96 1.60 - 2.40 mg/dL        Last Recorded Vitals  Visit Vitals  /81 (BP Location: Left arm, Patient Position: Lying)   Pulse 91   Temp 36.2 °C (97.2 °F) (Temporal)   Resp 17        Intake/Output last 3 Shifts:  No intake/output data recorded.    Relevant Results  Scheduled medications  apixaban, 5 mg, oral, BID  atorvastatin, 80 mg, oral, Nightly  buprenorphine-naloxone, 1 tablet, sublingual, BID  carbidopa-levodopa, 1 tablet, oral, TID  docusate sodium, 100 mg, oral, BID  escitalopram, 20 mg, oral, Daily  fluticasone furoate-vilanteroL, 1 puff, inhalation, Daily  folic acid, 1 mg, oral, Daily  gabapentin, 300 mg, oral, TID  LORazepam, 0.5 mg, oral, q6h MASON  nicotine, 1 patch, transdermal, Daily  pantoprazole, 40 mg, intravenous, Daily  potassium chloride, 20 mEq, oral, Daily  potassium chloride CR, 20 mEq, oral, Once  predniSONE, 10 mg, oral, Daily  thiamine, 100 mg, oral, Daily  tiotropium, 2 puff, inhalation, Daily  [Held by provider] torsemide, 10 mg, oral, Daily  [Held by provider] traZODone, 100 mg, oral, Nightly      Continuous medications     PRN medications  PRN medications: acetaminophen, albuterol, [Held by provider] busPIRone, oxygen, oxygen, QUEtiapine           Diagnostic Impression:  1) Delirium, acute  2) Encephalopathy  3) Parkinson's Disorder  4) COPD  5) Diastolic CHF  6) History of Factor V Leiden mutation        Recommendations:  1) Decrease Lorazepam 0.5 mg PO Q8H  2) Continue Lexapro 20 mg PO QDAY   3) Continue to taper Prednisone (Steroid)  4) Continue 1-to-1 sitter for safety of patient  5) Will continue to follow while here.      Please message with any questions/concerns.        Odilia Bustamante, APRN, CNP, PMHNP

## 2024-03-12 NOTE — CARE PLAN
The patient's goals for the shift include  to manage without sitter    The clinical goals for the shift include patient will remain safe with sitter at bedside

## 2024-03-12 NOTE — PROGRESS NOTES
Physical Therapy    Physical Therapy Treatment    Patient Name: Chris Wu  MRN: [id number]  Today's Date: 3/12/2024  Time Calculation  Start Time: 1000  Stop Time: 1025  Time Calculation (min): 25 min       Assessment/Plan   PT Assessment  PT Assessment Results: Decreased strength, Decreased range of motion, Decreased endurance, Impaired balance, Decreased mobility, Decreased cognition, Decreased safety awareness, Impaired judgement, Impaired hearing  Barriers to Discharge: cognition  End of Session Communication: Bedside nurse  Assessment Comment: pt continues to demonstrate impaired mobility, balance, endurance and safety awareness; pt is unable to follow simple commands for mobility d/t cognition and demonstrates severe impulsivity and decreased safety awareness as assessed with mobility; Pt would benefit from continued skilled therapy to address impairements if ability to follow instructions for basic mobility improves.  End of Session Patient Position: Bed, 3 rail up, Alarm on  PT Plan  Inpatient/Swing Bed or Outpatient: Inpatient  PT Plan  Treatment/Interventions: Bed mobility, Transfer training, Gait training, Balance training, Strengthening, Endurance training, Range of motion, Therapeutic exercise, Therapeutic activity  PT Plan: Skilled PT  PT Frequency: 3 times per week  PT Discharge Recommendations: Moderate intensity level of continued care  Equipment Recommended upon Discharge: Wheeled walker  PT Recommended Transfer Status: Assist x1  PT - OK to Discharge: Yes      General Visit Information:   PT  Visit  PT Received On: 03/12/24  General  Reason for Referral: 68 YOM admitted d/t AMS, referred to PT for impaired mobility  Referred By: KENYATTA Odonnell-CNP  Past Medical History Relevant to Rehab: PMH: COPD, Parkinson's disorder, history of factor V Leiden mutation with DVT and PE in the past & appears to not be on anticoagulation currently and is s/p IVC filter, depression/anxiety & PTSD &  diastolic CHF  Missed Visit: No  Family/Caregiver Present: No  Prior to Session Communication: Bedside nurse, PCT/NA/CTA (sitter in room)  General Comment: pleasantly confused, [place] with hearing aides, nursing finishing with patienty.02 at 3/5L with a resting po2 reading of 91 to 92%.    Subjective   Precautions:     Vital Signs:       Objective   Pain:  Pain Assessment  Pain Assessment: 0-10  Pain Score: 0 - No pain  Cognition:  Cognition  Orientation Level: Disoriented X4  Postural Control:  Static Sitting Balance  Static Sitting-Balance Support: Feet supported, Feet unsupported (stiff and retroleaning, tactile cues to have feet flat on floor)  Static Sitting-Level of Assistance: Minimum assistance, Close supervision  Static Sitting-Comment/Number of Minutes: x5 minutes  Dynamic Sitting Balance  Dynamic Sitting-Balance Support: Feet unsupported, No upper extremity supported  Dynamic Sitting-Balance: Reaching across midline, Reaching for objects (LE attempted therex all with min to CGA to SBA x1)  Dynamic Sitting-Comments: x5 minutes  Extremity/Trunk Assessments:    Activity Tolerance:     Treatments:  Therapeutic Exercise  Therapeutic Exercise Performed: Yes  Therapeutic Exercise Activity 1: seated long arc quads, heel toe raises, and marching, constant tactile and VC to stay on task, partial ROM completed, tactile and VC to stop.                             Bed Mobility  Bed Mobility: Yes  Bed Mobility 1  Bed Mobility 1: Rolling right, Scooting  Level of Assistance 1: Minimum assistance, Minimal verbal cues, Minimal tactile cues    Ambulation/Gait Training  Ambulation/Gait Training Performed: Yes  Ambulation/Gait Training 1  Surface 1: Level tile  Device 1: Rolling walker (handheld x1 with MOD to Min A x1 and VC)  Assistance 1: Moderate assistance, Minimum assistance  Quality of Gait 1: Festinating, Shuffling gait, Inconsistent stride length, Narrow base of support, Listing  Comments/Distance (ft) 1: 2ft bed to  chair then 20ft handheld x1 with Mod A x1 VC for safety and technique  Transfers  Transfer: Yes  Transfer 1  Transfer From 1: Sit to  Transfer to 1: Stand  Technique 1: Sit to stand, Stand to sit  Transfer Device 1:  (handheld x1 with Mod A , wants to retro-lean)                     Outcome Measures:  [facility] Basic Mobility  Turning from your back to your side while in a flat bed without using bedrails: A little  Moving from lying on your back to sitting on the side of a flat bed without using bedrails: A lot  Moving to and from bed to chair (including a wheelchair): A lot  Standing up from a chair using your arms (e.g. wheelchair or bedside chair): A lot  To walk in hospital room: A lot  Climbing 3-5 steps with railing: A lot  Basic Mobility - Total Score: 13    Education Documentation  Body Mechanics, taught by Laura Glass PTA at 3/12/2024 11:30 AM.  Learner: Patient  Readiness: Nonacceptance  Method: Demonstration  Response: Needs Reinforcement  Comment: confused    Precautions, taught by Laura Glass PTA at 3/12/2024 11:30 AM.  Learner: Patient  Readiness: Nonacceptance  Method: Demonstration  Response: Needs Reinforcement  Comment: confused    Mobility Training, taught by Laura Glass PTA at 3/12/2024 11:30 AM.  Learner: Patient  Readiness: Nonacceptance  Method: Demonstration  Response: Needs Reinforcement  Comment: confused    Education Comments  No comments found.        OP EDUCATION:       Encounter Problems       Encounter Problems (Active)       Balance       STG - Maintains static standing balance with unilateral upper extremity support x 5+ min in order to complete a functional task with SBA (Progressing)       Start:  03/04/24    Expected End:  03/18/24       INTERVENTIONS:  1. Practice standing with minimal support.  2. Educate patient about standing tolerance.  3. Educate patient about independence with gait, transfers, and ADL's.  4. Educate patient about use of assistive device.  5. Educate  patient about self-directed care.            Mobility       STG - Patient will ambulate 50 ft x 3 with appropriate device and supervision (Progressing)       Start:  03/04/24    Expected End:  03/18/24               Transfers       STG - Transfer from bed to chair with appropriate device IND (Progressing)       Start:  03/04/24    Expected End:  03/18/24            STG - Patient to transfer to and from sit to supine IND (Progressing)       Start:  03/04/24    Expected End:  03/18/24            STG - Patient will transfer sit to and from stand with appropriate device IND (Not Progressing)       Start:  03/04/24    Expected End:  03/18/24

## 2024-03-12 NOTE — PROGRESS NOTES
"   03/12/24 1539   Discharge Planning   Patient expects to be discharged to: Pt's [employer] case  manager Mike Troy was here visiting pt.  Pt gives verbal permission for me to talk to Will.  Will states pt was put on a benzo at the beginning of February and after that Will noticed a change in pt's behavior, that pt seemed \"out of it\", \"he wasn't there\".  Will states at home, pt was on palliative services with [facility] for his COPD.  He states pt's PCP is Annie Fritz (sp?) NP in [place].  Will also states pt has always voiced he would never want to go to nursing facility.  Will follow.       "

## 2024-03-12 NOTE — PROGRESS NOTES
"Occupational Therapy                 Therapy Communication Note    Patient Name: Chris Wu  MRN: [id number]  Today's Date: 3/12/2024     Discipline: Occupational Therapy    Missed Visit Reason: Missed Visit Reason: Patient refused (Per RN, pt is due for psych meds and has been waxing/waning. Upon arrival, pt states \"I'm half dead, I can't do this today\". Max encouragement provided, pt continued to refuse. No OT treat this date)    Missed Time: Attempt      "

## 2024-03-13 PROCEDURE — 97530 THERAPEUTIC ACTIVITIES: CPT | Mod: GO,CO

## 2024-03-13 PROCEDURE — 94760 N-INVAS EAR/PLS OXIMETRY 1: CPT

## 2024-03-13 PROCEDURE — 2500000001 HC RX 250 WO HCPCS SELF ADMINISTERED DRUGS (ALT 637 FOR MEDICARE OP): Performed by: NURSE PRACTITIONER

## 2024-03-13 PROCEDURE — 2500000004 HC RX 250 GENERAL PHARMACY W/ HCPCS (ALT 636 FOR OP/ED): Performed by: NURSE PRACTITIONER

## 2024-03-13 PROCEDURE — 1200000002 HC GENERAL ROOM WITH TELEMETRY DAILY

## 2024-03-13 PROCEDURE — 99232 SBSQ HOSP IP/OBS MODERATE 35: CPT | Performed by: STUDENT IN AN ORGANIZED HEALTH CARE EDUCATION/TRAINING PROGRAM

## 2024-03-13 PROCEDURE — 2500000002 HC RX 250 W HCPCS SELF ADMINISTERED DRUGS (ALT 637 FOR MEDICARE OP, ALT 636 FOR OP/ED): Performed by: FAMILY MEDICINE

## 2024-03-13 PROCEDURE — 2500000001 HC RX 250 WO HCPCS SELF ADMINISTERED DRUGS (ALT 637 FOR MEDICARE OP)

## 2024-03-13 PROCEDURE — 2500000004 HC RX 250 GENERAL PHARMACY W/ HCPCS (ALT 636 FOR OP/ED): Performed by: INTERNAL MEDICINE

## 2024-03-13 PROCEDURE — C9113 INJ PANTOPRAZOLE SODIUM, VIA: HCPCS | Performed by: NURSE PRACTITIONER

## 2024-03-13 PROCEDURE — 2500000001 HC RX 250 WO HCPCS SELF ADMINISTERED DRUGS (ALT 637 FOR MEDICARE OP): Performed by: FAMILY MEDICINE

## 2024-03-13 PROCEDURE — 97535 SELF CARE MNGMENT TRAINING: CPT | Mod: GO,CO

## 2024-03-13 PROCEDURE — 2500000002 HC RX 250 W HCPCS SELF ADMINISTERED DRUGS (ALT 637 FOR MEDICARE OP, ALT 636 FOR OP/ED): Performed by: INTERNAL MEDICINE

## 2024-03-13 PROCEDURE — S4991 NICOTINE PATCH NONLEGEND: HCPCS | Performed by: FAMILY MEDICINE

## 2024-03-13 RX ADMIN — NICOTINE 1 PATCH: 21 PATCH, EXTENDED RELEASE TRANSDERMAL at 09:46

## 2024-03-13 RX ADMIN — FOLIC ACID 1 MG: 1 TABLET ORAL at 09:45

## 2024-03-13 RX ADMIN — POTASSIUM CHLORIDE 20 MEQ: 1.5 FOR SOLUTION ORAL at 09:45

## 2024-03-13 RX ADMIN — PREDNISONE 10 MG: 10 TABLET ORAL at 09:45

## 2024-03-13 RX ADMIN — BUPRENORPHINE AND NALOXONE 1 TABLET: 8; 2 TABLET SUBLINGUAL at 09:46

## 2024-03-13 RX ADMIN — APIXABAN 5 MG: 5 TABLET, FILM COATED ORAL at 22:26

## 2024-03-13 RX ADMIN — LORAZEPAM 0.5 MG: 0.5 TABLET ORAL at 06:22

## 2024-03-13 RX ADMIN — TIOTROPIUM BROMIDE INHALATION SPRAY 2 PUFF: 3.12 SPRAY, METERED RESPIRATORY (INHALATION) at 09:46

## 2024-03-13 RX ADMIN — APIXABAN 5 MG: 5 TABLET, FILM COATED ORAL at 09:48

## 2024-03-13 RX ADMIN — GABAPENTIN 300 MG: 300 CAPSULE ORAL at 09:45

## 2024-03-13 RX ADMIN — LORAZEPAM 0.5 MG: 0.5 TABLET ORAL at 22:25

## 2024-03-13 RX ADMIN — THIAMINE HCL TAB 100 MG 100 MG: 100 TAB at 09:45

## 2024-03-13 RX ADMIN — CARBIDOPA AND LEVODOPA 1 TABLET: 25; 100 TABLET ORAL at 16:08

## 2024-03-13 RX ADMIN — QUETIAPINE FUMARATE 25 MG: 25 TABLET ORAL at 10:07

## 2024-03-13 RX ADMIN — GABAPENTIN 300 MG: 300 CAPSULE ORAL at 16:08

## 2024-03-13 RX ADMIN — BUPRENORPHINE AND NALOXONE 1 TABLET: 8; 2 TABLET SUBLINGUAL at 22:25

## 2024-03-13 RX ADMIN — DOCUSATE SODIUM 100 MG: 100 CAPSULE, LIQUID FILLED ORAL at 09:45

## 2024-03-13 RX ADMIN — CARBIDOPA AND LEVODOPA 1 TABLET: 25; 100 TABLET ORAL at 09:45

## 2024-03-13 RX ADMIN — PANTOPRAZOLE SODIUM 40 MG: 40 INJECTION, POWDER, FOR SOLUTION INTRAVENOUS at 09:46

## 2024-03-13 RX ADMIN — FLUTICASONE FUROATE AND VILANTEROL TRIFENATATE 1 PUFF: 200; 25 POWDER RESPIRATORY (INHALATION) at 09:46

## 2024-03-13 RX ADMIN — LORAZEPAM 0.5 MG: 0.5 TABLET ORAL at 16:08

## 2024-03-13 RX ADMIN — GABAPENTIN 300 MG: 300 CAPSULE ORAL at 22:26

## 2024-03-13 RX ADMIN — CARBIDOPA AND LEVODOPA 1 TABLET: 25; 100 TABLET ORAL at 22:25

## 2024-03-13 RX ADMIN — ESCITALOPRAM OXALATE 20 MG: 20 TABLET ORAL at 09:45

## 2024-03-13 ASSESSMENT — COGNITIVE AND FUNCTIONAL STATUS - GENERAL
WALKING IN HOSPITAL ROOM: A LOT
HELP NEEDED FOR BATHING: A LOT
MOVING TO AND FROM BED TO CHAIR: A LOT
STANDING UP FROM CHAIR USING ARMS: A LOT
PERSONAL GROOMING: A LOT
EATING MEALS: A LITTLE
DRESSING REGULAR UPPER BODY CLOTHING: A LITTLE
MOBILITY SCORE: 11
DAILY ACTIVITIY SCORE: 12
MOVING FROM LYING ON BACK TO SITTING ON SIDE OF FLAT BED WITH BEDRAILS: A LOT
DRESSING REGULAR LOWER BODY CLOTHING: A LOT
TURNING FROM BACK TO SIDE WHILE IN FLAT BAD: A LOT
STANDING UP FROM CHAIR USING ARMS: A LOT
CLIMB 3 TO 5 STEPS WITH RAILING: TOTAL
DRESSING REGULAR LOWER BODY CLOTHING: A LOT
PERSONAL GROOMING: A LITTLE
CLIMB 3 TO 5 STEPS WITH RAILING: TOTAL
DRESSING REGULAR UPPER BODY CLOTHING: A LOT
EATING MEALS: A LITTLE
TOILETING: A LOT
MOVING TO AND FROM BED TO CHAIR: A LOT
MOBILITY SCORE: 11
TOILETING: TOTAL
TURNING FROM BACK TO SIDE WHILE IN FLAT BAD: A LOT
WALKING IN HOSPITAL ROOM: A LOT
DAILY ACTIVITIY SCORE: 15
MOVING FROM LYING ON BACK TO SITTING ON SIDE OF FLAT BED WITH BEDRAILS: A LOT
HELP NEEDED FOR BATHING: A LOT

## 2024-03-13 ASSESSMENT — PAIN - FUNCTIONAL ASSESSMENT
PAIN_FUNCTIONAL_ASSESSMENT: 0-10

## 2024-03-13 ASSESSMENT — PAIN SCALES - PAIN ASSESSMENT IN ADVANCED DEMENTIA (PAINAD)
CONSOLABILITY: NO NEED TO CONSOLE
BREATHING: NORMAL
BODYLANGUAGE: RELAXED
TOTALSCORE: 0
FACIALEXPRESSION: SMILING OR INEXPRESSIVE

## 2024-03-13 ASSESSMENT — PAIN SCALES - GENERAL
PAINLEVEL_OUTOF10: 0 - NO PAIN

## 2024-03-13 ASSESSMENT — ACTIVITIES OF DAILY LIVING (ADL): HOME_MANAGEMENT_TIME_ENTRY: 18

## 2024-03-13 NOTE — PROGRESS NOTES
Occupational Therapy    Occupational Therapy Treatment    Name: Chris Wu  MRN: [id number]  : 1955  Date: 24  Time Calculation  Start Time: 802  Stop Time: 830  Time Calculation (min): 28 min    Assessment:  OT Assessment: Pt making fair progress towards OT goals. Would still benefit from continued skilled OT to maximize pt safety and general outcomes.  Prognosis: Good  Barriers to Discharge: None  Evaluation/Treatment Tolerance: Patient limited by fatigue  Medical Staff Made Aware: Yes  End of Session Communication: Bedside nurse, PCT/NA/KEVON  End of Session Patient Position: Up in chair, Alarm on  Plan:  Treatment Interventions: ADL retraining, Functional transfer training, Endurance training, Cognitive reorientation, Equipment evaluation/education, Compensatory technique education  OT Frequency: 3 times per week  OT Discharge Recommendations: Moderate intensity level of continued care  Equipment Recommended upon Discharge: Wheeled walker  OT Recommended Transfer Status: Assist of 1  OT - OK to Discharge: Yes    Subjective   Previous Visit Info:  OT Last Visit  OT Received On: 24  General:  General  Reason for Referral: 68 YOM admitted d/t AMS, referred to OT for impaired ADL and related mobility  Referred By: Lyndsay Augustine, APRN-CNP  Past Medical History Relevant to Rehab: PMH: COPD, Parkinson's disorder, history of factor V Leiden mutation with DVT and PE in the past & appears to not be on anticoagulation currently and is s/p IVC filter, depression/anxiety & PTSD & diastolic CHF  Prior to Session Communication: Bedside nurse, PCT/RACHEL/KEVON  Patient Position Received: Bed, 3 rail up, Alarm on  Preferred Learning Style: verbal  General Comment: Pt pleasantly confused, impulsive and compliant.  [place] with hearing aides, 02 via nasal cannula at 3L with a resting PO2 reading low 90's, robot pet cat present in room. Direction and redirection using verbal and tactile cues required for safety. Pt  repeatedly requested where he could buy a vape and expressed need for his cat.  Precautions:  Medical Precautions: Fall precautions  Precautions Comment: Significantly diminished insight and safety awareness noted in course of treatment.    Pain Assessment:  Pain Assessment  Pain Assessment: 0-10  Pain Score: 0 - No pain     Objective   Activities of Daily Living: Feeding  Feeding Level of Assistance: Setup, Close supervision  Feeding Where Assessed: Chair  Feeding Comments: Direction and redirection needed for on-task behaviors         LE Dressing  LE Dressing: Yes  Pants Level of Assistance: Moderate assistance  Adult Briefs Level of Assistance: Moderate assistance  LE Dressing Where Assessed: Toilet  LE Dressing Comments: Pt requires direction and cues for orientation of clothing and for sequencing/problem solving task.    Toileting  Toileting Level of Assistance: Moderate assistance  Where Assessed: Toilet  Toileting Comments: Pt required prompting for sitting for task. Assist needed for clean-up and clothing mangement.    Functional Standing Tolerance:     Bed Mobility/Transfers: Bed Mobility  Bed Mobility: Yes  Bed Mobility 1  Bed Mobility 1: Supine to sitting  Level of Assistance 1: Minimum assistance, Minimal verbal cues, Minimal tactile cues  Bed Mobility Comments 1: hand over hand for effective hand position required.    Transfers  Transfer: Yes  Transfer 1  Transfer From 1: Bed to  Transfer to 1: Stand  Technique 1: Sit to stand, Stand to sit  Transfer Device 1: Walker  Transfer Level of Assistance 1: Minimum assistance  Transfers 2  Transfer From 2: Bed to  Transfer to 2: Stand  Technique 2: Sit to stand, Stand to sit  Transfer Device 2: Walker  Transfer Level of Assistance 2: Minimum assistance    Outcome Measures:  [facility] Daily Activity  Putting on and taking off regular lower body clothing: A lot  Bathing (including washing, rinsing, drying): A lot  Putting on and taking off regular upper body  clothing: A little  Toileting, which includes using toilet, bedpan or urinal: A lot  Taking care of personal grooming such as brushing teeth: A little  Eating Meals: A little  Daily Activity - Total Score: 15    Education Documentation  Precautions, taught by LIDIA Costa at 3/13/2024  9:24 AM.  Learner: Patient  Readiness: Acceptance  Method: Explanation, Demonstration  Response: Verbalizes Understanding, Demonstrated Understanding, Needs Reinforcement, No Evidence of Learning  Comment: Pt demonstrates need for direction and redirection for safety in course of tasks.    ADL Training, taught by LIDIA Costa at 3/13/2024  9:24 AM.  Learner: Patient  Readiness: Acceptance  Method: Explanation, Demonstration  Response: Verbalizes Understanding, Demonstrated Understanding, Needs Reinforcement, No Evidence of Learning  Comment: Pt demonstrates need for direction and redirection for safety in course of tasks.    Body Mechanics, taught by LIDIA Costa at 3/13/2024  9:24 AM.  Learner: Patient  Readiness: Acceptance  Method: Explanation, Demonstration  Response: Verbalizes Understanding, Demonstrated Understanding, Needs Reinforcement, No Evidence of Learning  Comment: Pt demonstrates need for direction and redirection for safety in course of tasks.    Education Comments  Pt does not demonstrate follow through of instruction.    Goals:  Encounter Problems       Encounter Problems (Active)       OT Goals       Pt will demo functional transfers to/ from EOB, chair and commode sup and LRD (Progressing)       Start:  03/04/24    Expected End:  03/18/24            Pt will demo functional mobility necessary to complete ADL routine sup and LRD (Progressing)       Start:  03/04/24    Expected End:  03/18/24            Pt will demo ADL routine and meaningful daily activities with sup using modifications as needed  (Progressing)       Start:  03/04/24    Expected End:  03/18/24            Pt will demo improved static/  dynamic standing balance, evidenced by completion of BADL or functional activity with < SB assist for duration of activity.   (Progressing)       Start:  03/04/24    Expected End:  03/18/24            Pt will demo improved activity tolerance evidenced by participation in BADL and/or functional mobility x10 mins with </=1 rest break.   (Progressing)       Start:  03/04/24    Expected End:  03/18/24

## 2024-03-13 NOTE — PROGRESS NOTES
"   03/13/24 7833   Discharge Planning   Patient expects to be discharged to: Attempted to have pt sign Medicaid application documents, but he was still confused thinking he was in an \"art room at work\" and stating it was August 23rd.  Call placed to dtr to give update.  Waiting for return call.       "

## 2024-03-13 NOTE — CARE PLAN
The patient's goals for the shift include  LATANYA    The clinical goals for the shift include patient will remain safe this shift    Over the shift, the patient did not make progress toward the following goals. Barriers to progression include confusion, poor safety awareness. Recommendations to address these barriers include sleep hygiene, reorientation, bed alarm.

## 2024-03-13 NOTE — PROGRESS NOTES
Chris Wu is a 68 y.o. male on day 12 of admission presenting with Encephalopathy.      Subjective   Patient is sitting up in chair, he is eager to leave the hospital.  He is oriented to himself, hospital (when provided multiple-choice), year, and president.  He remains confused regarding the event and his thought process is not clear.  He denies shortness of breath, abdominal pain, chest pain.       Objective     Last Recorded Vitals  /74 (BP Location: Right arm, Patient Position: Lying)   Pulse 91   Temp 36.8 °C (98.2 °F) (Temporal)   Resp 20   Wt 76 kg (167 lb 8.8 oz)   SpO2 92%   Intake/Output last 3 Shifts:    Intake/Output Summary (Last 24 hours) at 3/13/2024 1835  Last data filed at 3/12/2024 2008  Gross per 24 hour   Intake 240 ml   Output --   Net 240 ml         Admission Weight  Weight: 72.6 kg (160 lb) (03/01/24 1533)    Daily Weight  03/11/24 : 76 kg (167 lb 8.8 oz)    Image Results  ECG 12 lead  Sinus rhythm with Premature atrial complexes  Left axis deviation  Septal infarct , age undetermined  Abnormal ECG  When compared with ECG of 02-MAY-2022 00:06,  Premature atrial complexes are now Present  Septal infarct is now Present  See ED provider note for full interpretation and clinical correlation  Confirmed by Karen Thibodeaux ([id number]) on 3/10/2024 12:39:54 PM      Physical Exam  Constitutional: resting comfortably in chair, no acute distress   Eyes: c making eye contact  Respiratory/Thorax: CTA bilaterally, no acute respiratory distress  Cardiovascular: regular rate and rhythm  Gastrointestinal: Nondistended, soft, non-tender  Musculoskeletal: no significant peripheral edema appreciated   Neurological: Alert, oriented to self, hospital, year, and president.  Disoriented to event.  Remains confused regarding situation  Psychological: Calm, thought content and judgment impaired  Skin: Warm and dry    Relevant Results  Scheduled medications  apixaban, 5 mg, oral, BID  atorvastatin, 80  mg, oral, Nightly  buprenorphine-naloxone, 1 tablet, sublingual, BID  carbidopa-levodopa, 1 tablet, oral, TID  docusate sodium, 100 mg, oral, BID  escitalopram, 20 mg, oral, Daily  fluticasone furoate-vilanteroL, 1 puff, inhalation, Daily  folic acid, 1 mg, oral, Daily  gabapentin, 300 mg, oral, TID  LORazepam, 0.5 mg, oral, q8h MASON  nicotine, 1 patch, transdermal, Daily  pantoprazole, 40 mg, intravenous, Daily  potassium chloride, 20 mEq, oral, Daily  potassium chloride CR, 20 mEq, oral, Once  predniSONE, 10 mg, oral, Daily  thiamine, 100 mg, oral, Daily  tiotropium, 2 puff, inhalation, Daily  [Held by provider] torsemide, 10 mg, oral, Daily  [Held by provider] traZODone, 100 mg, oral, Nightly      ECG 12 lead    Result Date: 3/10/2024  Sinus rhythm with Premature atrial complexes Left axis deviation Septal infarct , age undetermined Abnormal ECG When compared with ECG of 02-MAY-2022 00:06, Premature atrial complexes are now Present Septal infarct is now Present See ED provider note for full interpretation and clinical correlation Confirmed by Karen Thibodeaux ([id number]) on 3/10/2024 12:39:54 PM    Lower extremity venous duplex bilateral    Result Date: 3/4/2024  Interpreted By:  Alexx Ellsworth, STUDY: [facility] LOWER EXTREMITY VENOUS DUPLEX BILATERAL  3/4/2024 1:27 pm   INDICATION: Elevated D-dimer.   COMPARISON: 03/02/2024.   ACCESSION NUMBER(S): [id number]   ORDERING CLINICIAN: ANTWON RIOS   TECHNIQUE: Routine ultrasound of the  bilateral lower extremity was performed with duplex Doppler (color and spectral) evaluation.  Static images were obtained for remote interpretation.   FINDINGS: RIGHT LEG: The  right common femoral, femoral, popliteal, proximal medial saphenous, and deep femoral veins are patent and free of thrombus. The veins are normally compressible.  They demonstrate normal phasic flow and augmentation response.   Visualized segments of the right calf posterior tibial and peroneal vein  segments appear patent and compressible without evidence of thrombus.   LEFT LEG: Study is positive for left lower extremity DVT. The left common femoral vein is partially compressible containing nonocclusive thrombus. The left femoral vein is noncompressible containing thrombus, portions of which appear occlusive or near occlusive in the mid-distal segments. The left popliteal vein is incompletely compressible containing nonocclusive thrombus.   Thrombus is also evident within the left calf vein posterior tibial vein and peroneal vein segments.       POSITIVE FOR LEFT LOWER EXTREMITY DVT. Thrombus demonstrated in the left common femoral vein, femoral vein, and popliteal vein as well as the posterior tibial and peroneal vein segments of the left calf. Some areas of thrombus appear occlusive or near occlusive in these regions.   No evidence of right lower extremity DVT.   MACRO: Alexx Ellsworth discussed the significance and urgency of this critical finding via secure chat with  ANTWON RIOS on 3/4/2024 at 4:21 pm.  (**-RCF-**) Findings:  See findings.   Signed by: Alexx Ellsworth 3/4/2024 4:22 PM Dictation workstation:   [id number]    Lower extremity venous duplex bilateral    Result Date: 3/2/2024  Interpreted By:  Blu Xiong, STUDY: [facility] LOWER EXTREMITY VENOUS DUPLEX BILATERAL  3/2/2024 7:48 pm   INDICATION: 67 y/o   M with  Signs/Symptoms:r/o DVT. LMP:  Unknown.   COMPARISON: None.   ACCESSION NUMBER(S): [id number]   ORDERING CLINICIAN: SUGEY HURTADO   TECHNIQUE: Routine ultrasound of the  right lower extremity was performed with duplex Doppler (color and spectral) evaluation.   Static images were obtained for remote interpretation.   FINDINGS: Examination significantly limited due to patient condition.   Patency demonstrated from the left femoral vein proximally down to below the knee.         Limited examination due to patient condition. No DVT detected   MACRO: None   Signed by: Blu Xiong  3/2/2024 8:18 PM Dictation workstation:   [id number]    CT angio head and neck w and wo IV contrast    Result Date: 3/1/2024  Interpreted By:  Johnathon Higuera, STUDY: CT ANGIO HEAD AND NECK W AND WO IV CONTRAST;  3/1/2024 11:21 pm   INDICATION: Signs/Symptoms:R/O STROKE.   COMPARISON: Correlation made to noncontrast head CT of 03/01/2024.   ACCESSION NUMBER(S): [id number]   ORDERING CLINICIAN: SUGEY HURTADO   TECHNIQUE: Unenhanced CT images of the head were obtained. Subsequently, 75 cc Omnipaque 350 were administered intravenously and axial images of the head and neck were acquired.  Coronal, sagittal, and 3-D reconstructions were provided for review.   FINDINGS: Noncontrast head CT: Moderate to advanced volume loss.  There is periventricular and subcortical white matter hypoattenuation, most in keeping with chronic microvascular ischemic change. Gray-white matter differentiation is maintained. No evidence of acute intracranial hemorrhage. No mass, mass effect, midline shift, hydrocephalus, or abnormal extra-axial collection. Paranasal sinuses and mastoids are clear. Skull is within normal limits. Visible extracranial soft tissues including the orbits appear within normal limits.   CTA HEAD FINDINGS:   Anterior circulation: The bilateral intracranial internal carotid arteries, bilateral carotid terminals, bilateral proximal anterior and middle cerebral arteries are normal.   Posterior circulation: Bilateral intracranial vertebral arteries, vertebrobasilar junction, basilar artery and proximal posterior cerebral arteries are normal.   No intracranial saccular aneurysm or other abnormal intracranial enhancement is seen.   CTA NECK FINDINGS:   Right carotid vessels: Mild partially calcified plaque in the common carotid artery without significant luminal narrowing. Mild-to-moderate plaque in the bifurcation without significant luminal narrowing. Moderate to severe plaque in the carotid bulb causing narrowing of  between 20 and 30% by NASCET criteria. The more distal cervical ICA is of normal caliber without hemodynamically significant luminal narrowing.   Left carotid vessels: Mild partially calcified plaque in the common carotid artery without significant luminal narrowing. Mild-to-moderate plaque in the bifurcation without significant luminal narrowing. Moderate to severe plaque in the carotid bulb causing narrowing of between 20 and 30% by NASCET criteria. The more distal cervical ICA is of normal caliber without hemodynamically significant luminal narrowing.   Vertebral vessels: Mild calcific plaque at the vertebral artery origins without significant narrowing. The visualized segments of the cervical vertebral arteries are normal in caliber. Right vertebral artery is dominant.   ACDF changes noted. Severe emphysema. Biapical pleuroparenchymal scarring.       No evidence of acute intracranial hemorrhage or cortical infarct on noncontrast CT.   No evidence for hemodynamically significant stenosis of the cervical vessels. Mild atherosclerotic narrowing in the bilateral carotid bulbs measuring between 20 and 30% by NASCET criteria.   No evidence for significant stenosis or large branch vessel cutoffs of the intracranial vessels.   Incidentally noted severe emphysema in the visible lungs.   MACRO: None   Signed by: Johnathon Higuera 3/1/2024 11:46 PM Dictation workstation:   [id number]    CT thoracic spine wo IV contrast    Result Date: 3/1/2024  STUDY: CT Chest, Abdomen, and Pelvis with IV Contrast, CT Reconstruction Thoracic Spine and Lumbar Spine; 3/1/2024 at 5:58 p.m. INDICATION: Possible fall.  Altered mental status. COMPARISON: Two-view CXR 1/23/2024.  CT chest 8/1/2022.  US gallbladder 8/27/2020. ACCESSION NUMBER(S): [id number], [id number], [id number] ORDERING CLINICIAN: HUNTER ABBASI TECHNIQUE: CT of the chest, abdomen, and pelvis was performed.  Contiguous axial images were obtained at 3 mm slice thickness through  the chest, abdomen, and pelvis.  Coronal and sagittal reconstructions at 3 mm slice thickness were performed.  Omnipaque 350 75 mL was administered intravenously.  Please note that spinal images were generated from the original CT abdomen and pelvis imaging. FINDINGS: CHEST: MEDIASTINUM: The heart is normal in size without pericardial effusion.  Central vascular structures opacify normally.  LUNGS/PLEURA: There is no pleural effusion, pleural thickening, or pneumothorax. The airways are patent. There is a 6 mm nodule in the right upper lobe. This was present on the previous examination and is unchanged. Lungs are clear without consolidation, interstitial disease, or suspicious nodules. LYMPH NODES: Thoracic lymph nodes are not enlarged. ABDOMEN:  LIVER: No hepatomegaly.  Smooth surface contour.  Normal attenuation.  BILE DUCTS: No intrahepatic or extrahepatic biliary ductal dilatation.  GALLBLADDER: The gallbladder is unremarkable. STOMACH: No abnormalities identified.  PANCREAS: No masses or ductal dilatation.  SPLEEN: No splenomegaly or focal splenic lesion.  ADRENAL GLANDS: No thickening or nodules.  KIDNEYS AND URETERS: Kidneys are normal in size and location.  No renal or ureteral calculi.  PELVIS:  BLADDER: No abnormalities identified.  REPRODUCTIVE ORGANS: No abnormalities identified.  BOWEL: No abnormalities identified.  VESSELS: No abnormalities identified.  Abdominal aorta is normal in caliber.  PERITONEUM/RETROPERITONEUM/LYMPH NODES: No free fluid.  No pneumoperitoneum. No lymphadenopathy.  ABDOMINAL WALL: No abnormalities identified. SOFT TISSUES: No abnormalities identified.  BONES: No acute fracture or aggressive osseous lesion. THORACIC SPINE: The alignment is anatomic.  There is no fracture or traumatic subluxation. The vertebral body heights are well maintained.  Disc spaces are preserved.  No significant central canal stenosis is demonstrated. The neural foramina are patent throughout. There is  mild degenerative change. The paravertebral soft tissues are within normal limits. LUMBAR SPINE: There is mild degenerative scoliosis as well as mild anterolisthesis at L4-5. The spine is in otherwise good alignment.  There is no fracture or traumatic subluxation. The vertebral body heights are well maintained.  There is mild disc space during throughout the lumbar spine.  No significant central canal stenosis is demonstrated.  The right L4-5 neural foramen is slightly narrowed.  The paravertebral soft tissues are within normal limits.    1. No evidence for acute injury to the chest, abdomen, pelvis, thoracic, or lumbar spine. 2. There is degenerative change in the lumbar spine as described. 3. There is a stable 6 mm nodule in the right upper lobe. Signed by Robert Oquendo MD    CT lumbar spine wo IV contrast    Result Date: 3/1/2024  STUDY: CT Chest, Abdomen, and Pelvis with IV Contrast, CT Reconstruction Thoracic Spine and Lumbar Spine; 3/1/2024 at 5:58 p.m. INDICATION: Possible fall.  Altered mental status. COMPARISON: Two-view CXR 1/23/2024.  CT chest 8/1/2022.  US gallbladder 8/27/2020. ACCESSION NUMBER(S): [id number], [id number], [id number] ORDERING CLINICIAN: HUNTER ABBASI TECHNIQUE: CT of the chest, abdomen, and pelvis was performed.  Contiguous axial images were obtained at 3 mm slice thickness through the chest, abdomen, and pelvis.  Coronal and sagittal reconstructions at 3 mm slice thickness were performed.  Omnipaque 350 75 mL was administered intravenously.  Please note that spinal images were generated from the original CT abdomen and pelvis imaging. FINDINGS: CHEST: MEDIASTINUM: The heart is normal in size without pericardial effusion.  Central vascular structures opacify normally.  LUNGS/PLEURA: There is no pleural effusion, pleural thickening, or pneumothorax. The airways are patent. There is a 6 mm nodule in the right upper lobe. This was present on the previous examination and is  unchanged. Lungs are clear without consolidation, interstitial disease, or suspicious nodules. LYMPH NODES: Thoracic lymph nodes are not enlarged. ABDOMEN:  LIVER: No hepatomegaly.  Smooth surface contour.  Normal attenuation.  BILE DUCTS: No intrahepatic or extrahepatic biliary ductal dilatation.  GALLBLADDER: The gallbladder is unremarkable. STOMACH: No abnormalities identified.  PANCREAS: No masses or ductal dilatation.  SPLEEN: No splenomegaly or focal splenic lesion.  ADRENAL GLANDS: No thickening or nodules.  KIDNEYS AND URETERS: Kidneys are normal in size and location.  No renal or ureteral calculi.  PELVIS:  BLADDER: No abnormalities identified.  REPRODUCTIVE ORGANS: No abnormalities identified.  BOWEL: No abnormalities identified.  VESSELS: No abnormalities identified.  Abdominal aorta is normal in caliber.  PERITONEUM/RETROPERITONEUM/LYMPH NODES: No free fluid.  No pneumoperitoneum. No lymphadenopathy.  ABDOMINAL WALL: No abnormalities identified. SOFT TISSUES: No abnormalities identified.  BONES: No acute fracture or aggressive osseous lesion. THORACIC SPINE: The alignment is anatomic.  There is no fracture or traumatic subluxation. The vertebral body heights are well maintained.  Disc spaces are preserved.  No significant central canal stenosis is demonstrated. The neural foramina are patent throughout. There is mild degenerative change. The paravertebral soft tissues are within normal limits. LUMBAR SPINE: There is mild degenerative scoliosis as well as mild anterolisthesis at L4-5. The spine is in otherwise good alignment.  There is no fracture or traumatic subluxation. The vertebral body heights are well maintained.  There is mild disc space during throughout the lumbar spine.  No significant central canal stenosis is demonstrated.  The right L4-5 neural foramen is slightly narrowed.  The paravertebral soft tissues are within normal limits.    1. No evidence for acute injury to the chest, abdomen,  pelvis, thoracic, or lumbar spine. 2. There is degenerative change in the lumbar spine as described. 3. There is a stable 6 mm nodule in the right upper lobe. Signed by Robert Oquendo MD    CT chest abdomen pelvis w IV contrast    Result Date: 3/1/2024  STUDY: CT Chest, Abdomen, and Pelvis with IV Contrast, CT Reconstruction Thoracic Spine and Lumbar Spine; 3/1/2024 at 5:58 p.m. INDICATION: Possible fall.  Altered mental status. COMPARISON: Two-view CXR 1/23/2024.  CT chest 8/1/2022.  US gallbladder 8/27/2020. ACCESSION NUMBER(S): [id number], [id number], [id number] ORDERING CLINICIAN: HUNTER ABBASI TECHNIQUE: CT of the chest, abdomen, and pelvis was performed.  Contiguous axial images were obtained at 3 mm slice thickness through the chest, abdomen, and pelvis.  Coronal and sagittal reconstructions at 3 mm slice thickness were performed.  Omnipaque 350 75 mL was administered intravenously.  Please note that spinal images were generated from the original CT abdomen and pelvis imaging. FINDINGS: CHEST: MEDIASTINUM: The heart is normal in size without pericardial effusion.  Central vascular structures opacify normally.  LUNGS/PLEURA: There is no pleural effusion, pleural thickening, or pneumothorax. The airways are patent. There is a 6 mm nodule in the right upper lobe. This was present on the previous examination and is unchanged. Lungs are clear without consolidation, interstitial disease, or suspicious nodules. LYMPH NODES: Thoracic lymph nodes are not enlarged. ABDOMEN:  LIVER: No hepatomegaly.  Smooth surface contour.  Normal attenuation.  BILE DUCTS: No intrahepatic or extrahepatic biliary ductal dilatation.  GALLBLADDER: The gallbladder is unremarkable. STOMACH: No abnormalities identified.  PANCREAS: No masses or ductal dilatation.  SPLEEN: No splenomegaly or focal splenic lesion.  ADRENAL GLANDS: No thickening or nodules.  KIDNEYS AND URETERS: Kidneys are normal in size and location.  No renal or  ureteral calculi.  PELVIS:  BLADDER: No abnormalities identified.  REPRODUCTIVE ORGANS: No abnormalities identified.  BOWEL: No abnormalities identified.  VESSELS: No abnormalities identified.  Abdominal aorta is normal in caliber.  PERITONEUM/RETROPERITONEUM/LYMPH NODES: No free fluid.  No pneumoperitoneum. No lymphadenopathy.  ABDOMINAL WALL: No abnormalities identified. SOFT TISSUES: No abnormalities identified.  BONES: No acute fracture or aggressive osseous lesion. THORACIC SPINE: The alignment is anatomic.  There is no fracture or traumatic subluxation. The vertebral body heights are well maintained.  Disc spaces are preserved.  No significant central canal stenosis is demonstrated. The neural foramina are patent throughout. There is mild degenerative change. The paravertebral soft tissues are within normal limits. LUMBAR SPINE: There is mild degenerative scoliosis as well as mild anterolisthesis at L4-5. The spine is in otherwise good alignment.  There is no fracture or traumatic subluxation. The vertebral body heights are well maintained.  There is mild disc space during throughout the lumbar spine.  No significant central canal stenosis is demonstrated.  The right L4-5 neural foramen is slightly narrowed.  The paravertebral soft tissues are within normal limits.    1. No evidence for acute injury to the chest, abdomen, pelvis, thoracic, or lumbar spine. 2. There is degenerative change in the lumbar spine as described. 3. There is a stable 6 mm nodule in the right upper lobe. Signed by Robert Oquendo MD    XR chest 1 view    Result Date: 3/1/2024  STUDY: Chest Radiograph;  3/1/24 at 5:47 PM INDICATION: Altered mental status. COMPARISON: Chest XR 1/23/24. ACCESSION NUMBER(S): [id number] ORDERING CLINICIAN: HUNTER ABBASI TECHNIQUE:  Frontal chest was obtained at 1746 hours. (two images) FINDINGS: CARDIOMEDIASTINAL SILHOUETTE: Cardiomediastinal silhouette is normal in size and configuration.  LUNGS: Lungs  are clear.  ABDOMEN: No remarkable upper abdominal findings.  BONES: No acute osseous changes.    No acute pulmonary pathology. Signed by Robert Oquendo MD    XR pelvis 1-2 views    Result Date: 3/1/2024  STUDY: Pelvis Radiographs; 3/1/24 at 5:47 PM INDICATION: Left hip pain. COMPARISON: Left hip XR 4/30/16. ACCESSION NUMBER(S): [id number] ORDERING CLINICIAN: HUNTER MATUTE WIRE TECHNIQUE:  Two view(s) of the pelvis. FINDINGS:  The pelvic ring is intact.  There is no acute fracture. There are degenerative changes lower lumbosacral spine.  There is an inferior vena cava filter present.    No acute osseous abnormalities. Signed by Be Hare MD    CT head wo IV contrast    Result Date: 3/1/2024  Interpreted By:  Johnathon Higuera, STUDY: CT HEAD WO IV CONTRAST; CT CERVICAL SPINE WO IV CONTRAST; ;  3/1/2024 5:38 pm   INDICATION: Signs/Symptoms:ams possible fall.   COMPARISON: Noncontrast CT head of 05/05/2021. CT cervical spine of 06/04/2010.   ACCESSION NUMBER(S): [id number]; [id number]   ORDERING CLINICIAN: HUNTER ABBASI   TECHNIQUE: Noncontrast CT exams of the head and cervical spine with multiplanar reformations.   FINDINGS: BRAIN PARENCHYMA: Moderate to advanced volume loss.  There is periventricular and subcortical white matter hypoattenuation, most in keeping with chronic microvascular ischemic change. Cystic spaces in the bilateral corpus striatum probably representing chronic lacunar infarcts. Gray-white matter interfaces are preserved. No mass, mass effect or midline shift.   HEMORRHAGE: No acute intracranial hemorrhage. VENTRICLES and EXTRA-AXIAL SPACES: Normal size. EXTRACRANIAL SOFT TISSUES: Within normal limits. PARANASAL SINUSES/MASTOIDS: The visualized paranasal sinuses and mastoid air cells are aerated. CALVARIUM: No depressed skull fracture. No destructive osseous lesion.   OTHER FINDINGS: None.   CERVICAL SPINE:   Mild reversal of the normal cervical lordosis could reflect positioning or muscle spasm.  ALIGNMENT: New trace degenerative anterolisthesis at C2-C3. Alignment is otherwise normal. VERTEBRAE: Acute fracture. Vertebral stature is maintained. Redemonstrated ACDF changes at C5-C7. Severe discogenic degeneration with endplate irregularity and osteophytosis and uncovertebral hypertrophy at C3-C4 and C4-C5. Moderate hypertrophic facet osteoarthropathy excepting on the right at C 2 C3 where it is severe in degree. SPINAL CANAL: No critical spinal canal stenosis. Moderate to severe degenerative canal narrowing at C4-C5 secondary to disc osteophyte bulging, similar to before. PREVERTEBRAL SOFT TISSUES: No prevertebral soft tissue swelling. LUNG APICES: Moderate to severe emphysema   OTHER FINDINGS: None.       No evidence of acute intracranial abnormality.   No acute cervical spine fracture or malalignment.     MACRO: None   Signed by: Johnathon Higuera 3/1/2024 6:09 PM Dictation workstation:   [id number]    CT cervical spine wo IV contrast    Result Date: 3/1/2024  Interpreted By:  Johnathon Higuera, STUDY: CT HEAD WO IV CONTRAST; CT CERVICAL SPINE WO IV CONTRAST; ;  3/1/2024 5:38 pm   INDICATION: Signs/Symptoms:ams possible fall.   COMPARISON: Noncontrast CT head of 05/05/2021. CT cervical spine of 06/04/2010.   ACCESSION NUMBER(S): [id number]; [id number]   ORDERING CLINICIAN: HUNTRE ABBASI   TECHNIQUE: Noncontrast CT exams of the head and cervical spine with multiplanar reformations.   FINDINGS: BRAIN PARENCHYMA: Moderate to advanced volume loss.  There is periventricular and subcortical white matter hypoattenuation, most in keeping with chronic microvascular ischemic change. Cystic spaces in the bilateral corpus striatum probably representing chronic lacunar infarcts. Gray-white matter interfaces are preserved. No mass, mass effect or midline shift.   HEMORRHAGE: No acute intracranial hemorrhage. VENTRICLES and EXTRA-AXIAL SPACES: Normal size. EXTRACRANIAL SOFT TISSUES: Within normal limits. PARANASAL  SINUSES/MASTOIDS: The visualized paranasal sinuses and mastoid air cells are aerated. CALVARIUM: No depressed skull fracture. No destructive osseous lesion.   OTHER FINDINGS: None.   CERVICAL SPINE:   Mild reversal of the normal cervical lordosis could reflect positioning or muscle spasm. ALIGNMENT: New trace degenerative anterolisthesis at C2-C3. Alignment is otherwise normal. VERTEBRAE: Acute fracture. Vertebral stature is maintained. Redemonstrated ACDF changes at C5-C7. Severe discogenic degeneration with endplate irregularity and osteophytosis and uncovertebral hypertrophy at C3-C4 and C4-C5. Moderate hypertrophic facet osteoarthropathy excepting on the right at C 2 C3 where it is severe in degree. SPINAL CANAL: No critical spinal canal stenosis. Moderate to severe degenerative canal narrowing at C4-C5 secondary to disc osteophyte bulging, similar to before. PREVERTEBRAL SOFT TISSUES: No prevertebral soft tissue swelling. LUNG APICES: Moderate to severe emphysema   OTHER FINDINGS: None.       No evidence of acute intracranial abnormality.   No acute cervical spine fracture or malalignment.     MACRO: None   Signed by: Johnathon Higuera 3/1/2024 6:09 PM Dictation workstation:   [id number]     Continuous medications     PRN medications  PRN medications: acetaminophen, albuterol, oxygen, oxygen, QUEtiapine       Assessment/Plan      Chris Wu is a 68 y.o. male with pertinent hx of COPD, Parkinson's disorder, history of factor V Leiden mutation with DVT and PE in the past not on anticoagulation currently s/p IVC filter, depression/anxiety & PTSD & diastolic CHF who was admitted to the hospital secondary to acute encephalopathy.     Altered mental status/encephalopathy, 2/2 to  polypharmacy  Parkinson disease  Depression ADD/PTSD  ANRDEA  Multiple imaging modalities pursued without etiology for altered mental status discovered.    Patient has limited contact with his family although per chart review had recent contact  with daughter Orin.  She did note some concern for cognitive decline during the conversations.    Multiple medication adjustments have been made since arrival with improvement in mentation.  Home medications that may be contributing to altered mentation:  Suboxone daily  BuSpar 20 mg 3 times daily  Sinemet  3 times daily  Lexapro 20 mg daily  Gabapentin 600 mg 3 times daily  Atarax 25 mg twice daily  Ativan 1 mg twice daily as needed for anxiety  Seroquel 50 mg 4 times daily as needed for anxiety  Trazodone 100 mg once daily at bedtime     Current medications:   Suboxone twice daily, dose unchanged  Sinemet twice daily, dose unchanged  Lexapro 20 mg daily, dose unchanged  Gabapentin 300 mg 3 times daily, dose decreased  Atarax discontinued  Lorazepam 0.5 mg every 8 hours, dose decreased (tapering)  Seroquel changed to 25 mg 4 times daily as needed for agitation, dose decreased  Trazodone discontinued    Per [facility] report, oxazepam and lorazepam started at the end of January.  He picked up prescriptions every week with pill count ranging from 28 to 56 pills.  He has been reliably picking up his Suboxone and gabapentin for at least the past couple of years.    Extensive left lower extremity DVT  3/4/24 POSITIVE FOR LEFT LOWER EXTREMITY DVT. Thrombus demonstrated in the left common femoral vein, femoral vein, and popliteal vein as well as the posterior tibial and peroneal vein segments of the left calf. Some areas of thrombus appear occlusive or near occlusive in these regions.      Continue Eliquis for treatment.    Lung nodule, incidental. 6 mm right upper lobe nodule  -Follow-up with pulmonology and primary care provider as an outpatient for further evaluation management.      Chronic hypoxemic respiratory failure  COPD without evidence of exacerbation  Patient has been prescribed prednisone 20 mg daily since May 2023.  Unclear reasoning for this, will reach out to PCP to assess why.  Prednisone taper has  been started here and he is currently on 10 mg daily.    Hypokalemia, resolved      Inability to care for self  PT OT and social work consulted      GERD  Continue PPI    DVT prophylaxis eliquis       Disposition: Plan to discharge to SNF once encephalopathy has further improved.  Patient will require Medicaid application completion, although he remains confused and is unable to assist with this.  Anticipate prolonged admission in order to facilitate safe disposition.    Discussed with patient and RN  Discussed with TCC    Time spent coordination of care 45 minutes to include chart review       Chetna Archer, DO

## 2024-03-13 NOTE — PROGRESS NOTES
"  Subjective   Chris Wu is a 68 y.o. year old male patient who was personally seen and interviewed.  The patient was interviewed alone in his room (interviewed sitting up in bed), and was easily engaged and cooperative. This afternoon, Chris reports feeling \"aggravated\" due to not knowing where his phone was (this writer helped the patient locate his phone and make a phone call, mood seemed to improve).  Chris currently rates his depression at a 4 out of 10. No suicidal ideation or plans were elicited. He also rates his anxiety at a 10 out of 10. No hallucinations or paranoia were endorsed or noted.  No issues per staff.           Objective   Mental Status Exam:   General: Appropriately groomed and dressed in hospital attire.   Appearance: Appears stated age.   Attitude: Calm, cooperative.   Behavior: Appropriate eye contact.   Motor Activity: No agitation or retardation. No EPS/TD.  Unable to assess gait and station. Normal muscle tone and bulk.   Speech: Regular rate, rhythm, volume and tone, spontaneous,  fluent. Non-pressured.   Mood: \"aggravated\"   Affect: Neutral.   Thought Process: Organized.     Thought Content: Does not endorse suicidal ideation or any suicide plans.   Does not endorse homicidal ideation.  No overt delusions or paranoia elicited.    Thought Perception: Does not endorse auditory or visual hallucinations, does not appear to be responding to hallucinatory stimuli.   Cognition: Alert, oriented x 3, deficit to situation.  Adequate fund of knowledge. No deficit in recent and remote memory. No deficits in attention, concentration or language.   Insight: Fair, as patient recognizes symptoms of  illness and need for recommended treatments.    Judgment: Fair, as patient can make reasonable decisions about ordinary activities of daily living and necessary medical care recommendations.       LABS:  No results found for this or any previous visit (from the past 24 hour(s)).     Last Recorded " "Vitals  Visit Vitals  /76 (Patient Position: Lying)   Pulse 72   Temp 36.5 °C (97.7 °F) (Temporal)   Resp 16        Intake/Output last 3 Shifts:  No intake/output data recorded.    Relevant Results  Scheduled medications  apixaban, 5 mg, oral, BID  atorvastatin, 80 mg, oral, Nightly  buprenorphine-naloxone, 1 tablet, sublingual, BID  carbidopa-levodopa, 1 tablet, oral, TID  docusate sodium, 100 mg, oral, BID  escitalopram, 20 mg, oral, Daily  fluticasone furoate-vilanteroL, 1 puff, inhalation, Daily  folic acid, 1 mg, oral, Daily  gabapentin, 300 mg, oral, TID  LORazepam, 0.5 mg, oral, q8h MASON  nicotine, 1 patch, transdermal, Daily  pantoprazole, 40 mg, intravenous, Daily  potassium chloride, 20 mEq, oral, Daily  potassium chloride CR, 20 mEq, oral, Once  predniSONE, 10 mg, oral, Daily  thiamine, 100 mg, oral, Daily  tiotropium, 2 puff, inhalation, Daily  [Held by provider] torsemide, 10 mg, oral, Daily  [Held by provider] traZODone, 100 mg, oral, Nightly      Continuous medications     PRN medications  PRN medications: acetaminophen, albuterol, oxygen, oxygen, QUEtiapine         The patient is judged a minimal suicide risk due to: 1) No access to guns, 2) Denies any prior suicide attempts, 3) Denies any current suicidal ideation or suicide plans, 4) +plans for the future: \"I want to go home and see my cat\" and 5) Only mild symptoms of Major Depressive Disorder elicited.        Diagnostic Impression:  1) Delirium, acute  2) Encephalopathy  3) Parkinson's Disorder  4) COPD  5) Diastolic CHF  6) History of Factor V Leiden mutation        Recommendations:  1) Continue Lorazepam 0.5 mg PO Q8H  2) Continue Lexapro 20 mg PO QDAY   3) Continue to taper Prednisone (Steroid)  4) Will continue to follow while here.       Patient does not meet criteria for involuntary psychiatric admission at this time.  Psychiatry will sign off.         Odilia Bustamante, APRN, CNP, PMHNP   "

## 2024-03-13 NOTE — CARE PLAN
The clinical goals for the shift include pt will remain safe this shift.      Problem: Skin  Goal: Promote skin healing  Outcome: Progressing  Flowsheets (Taken 3/13/2024 1388)  Promote skin healing:   Protective dressings over bony prominences   Turn/reposition every 2 hours/use positioning/transfer devices   Assess skin/pad under line(s)/device(s)     Problem: Safety  Goal: Patient will be injury free during hospitalization  Outcome: Progressing  Goal: I will remain free of falls  Outcome: Progressing

## 2024-03-13 NOTE — PROGRESS NOTES
"Physical Therapy                 Therapy Communication Note    Patient Name: Chris Wu  MRN: [id number]  Today's Date: 3/13/2024     Discipline: Physical Therapy    Missed Visit Reason: Missed Visit Reason: Patient refused (\"I just did all that and just got back to bed. Im resting now\" no tx, RN aware.)    Missed Time: Attempt 1035    Comment:  "

## 2024-03-14 ENCOUNTER — APPOINTMENT (OUTPATIENT)
Dept: RADIOLOGY | Facility: HOSPITAL | Age: 69
DRG: 091 | End: 2024-03-14
Payer: MEDICARE

## 2024-03-14 LAB — GLUCOSE BLD MANUAL STRIP-MCNC: 149 MG/DL (ref 74–99)

## 2024-03-14 PROCEDURE — 82947 ASSAY GLUCOSE BLOOD QUANT: CPT

## 2024-03-14 PROCEDURE — 2500000002 HC RX 250 W HCPCS SELF ADMINISTERED DRUGS (ALT 637 FOR MEDICARE OP, ALT 636 FOR OP/ED): Performed by: FAMILY MEDICINE

## 2024-03-14 PROCEDURE — 1200000002 HC GENERAL ROOM WITH TELEMETRY DAILY

## 2024-03-14 PROCEDURE — 2500000001 HC RX 250 WO HCPCS SELF ADMINISTERED DRUGS (ALT 637 FOR MEDICARE OP): Performed by: STUDENT IN AN ORGANIZED HEALTH CARE EDUCATION/TRAINING PROGRAM

## 2024-03-14 PROCEDURE — 99233 SBSQ HOSP IP/OBS HIGH 50: CPT | Performed by: STUDENT IN AN ORGANIZED HEALTH CARE EDUCATION/TRAINING PROGRAM

## 2024-03-14 PROCEDURE — S4991 NICOTINE PATCH NONLEGEND: HCPCS | Performed by: FAMILY MEDICINE

## 2024-03-14 PROCEDURE — 2500000005 HC RX 250 GENERAL PHARMACY W/O HCPCS: Performed by: NURSE PRACTITIONER

## 2024-03-14 PROCEDURE — 97530 THERAPEUTIC ACTIVITIES: CPT | Mod: GP,CQ

## 2024-03-14 PROCEDURE — 70551 MRI BRAIN STEM W/O DYE: CPT | Performed by: STUDENT IN AN ORGANIZED HEALTH CARE EDUCATION/TRAINING PROGRAM

## 2024-03-14 PROCEDURE — 70551 MRI BRAIN STEM W/O DYE: CPT

## 2024-03-14 PROCEDURE — C9113 INJ PANTOPRAZOLE SODIUM, VIA: HCPCS | Performed by: NURSE PRACTITIONER

## 2024-03-14 PROCEDURE — 2500000001 HC RX 250 WO HCPCS SELF ADMINISTERED DRUGS (ALT 637 FOR MEDICARE OP): Performed by: NURSE PRACTITIONER

## 2024-03-14 PROCEDURE — 2500000002 HC RX 250 W HCPCS SELF ADMINISTERED DRUGS (ALT 637 FOR MEDICARE OP, ALT 636 FOR OP/ED): Performed by: INTERNAL MEDICINE

## 2024-03-14 PROCEDURE — 2500000004 HC RX 250 GENERAL PHARMACY W/ HCPCS (ALT 636 FOR OP/ED): Performed by: INTERNAL MEDICINE

## 2024-03-14 PROCEDURE — 2500000004 HC RX 250 GENERAL PHARMACY W/ HCPCS (ALT 636 FOR OP/ED): Performed by: NURSE PRACTITIONER

## 2024-03-14 PROCEDURE — 2500000001 HC RX 250 WO HCPCS SELF ADMINISTERED DRUGS (ALT 637 FOR MEDICARE OP)

## 2024-03-14 PROCEDURE — 97116 GAIT TRAINING THERAPY: CPT | Mod: GP,CQ

## 2024-03-14 PROCEDURE — 2500000001 HC RX 250 WO HCPCS SELF ADMINISTERED DRUGS (ALT 637 FOR MEDICARE OP): Performed by: FAMILY MEDICINE

## 2024-03-14 RX ORDER — MICONAZOLE NITRATE 2 %
2 CREAM (GRAM) TOPICAL EVERY 2 HOUR PRN
Status: DISCONTINUED | OUTPATIENT
Start: 2024-03-14 | End: 2024-03-18 | Stop reason: HOSPADM

## 2024-03-14 RX ADMIN — LORAZEPAM 0.5 MG: 0.5 TABLET ORAL at 20:18

## 2024-03-14 RX ADMIN — BUPRENORPHINE AND NALOXONE 1 TABLET: 8; 2 TABLET SUBLINGUAL at 20:18

## 2024-03-14 RX ADMIN — PREDNISONE 10 MG: 10 TABLET ORAL at 10:00

## 2024-03-14 RX ADMIN — FOLIC ACID 1 MG: 1 TABLET ORAL at 09:59

## 2024-03-14 RX ADMIN — PANTOPRAZOLE SODIUM 40 MG: 40 INJECTION, POWDER, FOR SOLUTION INTRAVENOUS at 09:39

## 2024-03-14 RX ADMIN — BUPRENORPHINE AND NALOXONE 1 TABLET: 8; 2 TABLET SUBLINGUAL at 09:59

## 2024-03-14 RX ADMIN — FLUTICASONE FUROATE AND VILANTEROL TRIFENATATE 1 PUFF: 200; 25 POWDER RESPIRATORY (INHALATION) at 09:58

## 2024-03-14 RX ADMIN — LORAZEPAM 0.5 MG: 0.5 TABLET ORAL at 14:50

## 2024-03-14 RX ADMIN — APIXABAN 5 MG: 5 TABLET, FILM COATED ORAL at 10:03

## 2024-03-14 RX ADMIN — POTASSIUM CHLORIDE 20 MEQ: 1.5 FOR SOLUTION ORAL at 10:00

## 2024-03-14 RX ADMIN — DOCUSATE SODIUM 100 MG: 100 CAPSULE, LIQUID FILLED ORAL at 10:00

## 2024-03-14 RX ADMIN — ESCITALOPRAM OXALATE 20 MG: 20 TABLET ORAL at 09:59

## 2024-03-14 RX ADMIN — QUETIAPINE FUMARATE 25 MG: 25 TABLET ORAL at 20:18

## 2024-03-14 RX ADMIN — CARBIDOPA AND LEVODOPA 1 TABLET: 25; 100 TABLET ORAL at 10:00

## 2024-03-14 RX ADMIN — CARBIDOPA AND LEVODOPA 1 TABLET: 25; 100 TABLET ORAL at 14:50

## 2024-03-14 RX ADMIN — APIXABAN 5 MG: 5 TABLET, FILM COATED ORAL at 20:18

## 2024-03-14 RX ADMIN — CARBIDOPA AND LEVODOPA 1 TABLET: 25; 100 TABLET ORAL at 20:18

## 2024-03-14 RX ADMIN — THIAMINE HCL TAB 100 MG 100 MG: 100 TAB at 10:00

## 2024-03-14 RX ADMIN — NICOTINE POLACRILEX 2 MG: 2 GUM, CHEWING BUCCAL at 14:50

## 2024-03-14 RX ADMIN — GABAPENTIN 300 MG: 300 CAPSULE ORAL at 10:00

## 2024-03-14 RX ADMIN — LORAZEPAM 0.5 MG: 0.5 TABLET ORAL at 05:31

## 2024-03-14 RX ADMIN — NICOTINE 1 PATCH: 21 PATCH, EXTENDED RELEASE TRANSDERMAL at 10:04

## 2024-03-14 RX ADMIN — TIOTROPIUM BROMIDE INHALATION SPRAY 2 PUFF: 3.12 SPRAY, METERED RESPIRATORY (INHALATION) at 09:59

## 2024-03-14 RX ADMIN — GABAPENTIN 300 MG: 300 CAPSULE ORAL at 20:18

## 2024-03-14 RX ADMIN — GABAPENTIN 300 MG: 300 CAPSULE ORAL at 14:50

## 2024-03-14 RX ADMIN — QUETIAPINE FUMARATE 25 MG: 25 TABLET ORAL at 15:22

## 2024-03-14 ASSESSMENT — COGNITIVE AND FUNCTIONAL STATUS - GENERAL
CLIMB 3 TO 5 STEPS WITH RAILING: A LOT
MOVING TO AND FROM BED TO CHAIR: A LITTLE
WALKING IN HOSPITAL ROOM: A LOT
HELP NEEDED FOR BATHING: A LOT
WALKING IN HOSPITAL ROOM: A LOT
DAILY ACTIVITIY SCORE: 13
MOVING TO AND FROM BED TO CHAIR: A LITTLE
STANDING UP FROM CHAIR USING ARMS: A LITTLE
DRESSING REGULAR UPPER BODY CLOTHING: A LITTLE
DRESSING REGULAR LOWER BODY CLOTHING: A LOT
TOILETING: A LOT
DRESSING REGULAR UPPER BODY CLOTHING: A LOT
DRESSING REGULAR LOWER BODY CLOTHING: A LITTLE
WALKING IN HOSPITAL ROOM: A LOT
MOVING TO AND FROM BED TO CHAIR: A LITTLE
MOBILITY SCORE: 18
CLIMB 3 TO 5 STEPS WITH RAILING: A LOT
TURNING FROM BACK TO SIDE WHILE IN FLAT BAD: A LITTLE
TOILETING: A LOT
CLIMB 3 TO 5 STEPS WITH RAILING: A LOT
STANDING UP FROM CHAIR USING ARMS: A LITTLE
PERSONAL GROOMING: A LOT
MOBILITY SCORE: 17
MOBILITY SCORE: 17
TURNING FROM BACK TO SIDE WHILE IN FLAT BAD: A LITTLE
EATING MEALS: A LITTLE
HELP NEEDED FOR BATHING: A LITTLE
STANDING UP FROM CHAIR USING ARMS: A LITTLE

## 2024-03-14 ASSESSMENT — PAIN SCALES - GENERAL
PAINLEVEL_OUTOF10: 0 - NO PAIN
PAINLEVEL_OUTOF10: 0 - NO PAIN

## 2024-03-14 ASSESSMENT — PAIN - FUNCTIONAL ASSESSMENT
PAIN_FUNCTIONAL_ASSESSMENT: 0-10

## 2024-03-14 NOTE — CARE PLAN
The patient's goals for the shift include  pt will have no mental alterations    The clinical goals for the shift include pt will be free of falls during shift    Patient relaxing in chair during shift. Patient caring for 'pet cat' in room.

## 2024-03-14 NOTE — PROGRESS NOTES
03/14/24 1519   Discharge Planning   Patient expects to be discharged to: Spoke to sister by phone, Rahel [phone], who states she and pt's dtr do not know enough information about pt to be helpful with Medicaid applicatioon.  Sister states she and dtr would agree to pt having a guardian appointed by the courts if needed.  Consulted with physician re: difficulty of assessing pt's cognition.   Pt very [place], I was unable to have a meaningful conversation with him.

## 2024-03-14 NOTE — PROGRESS NOTES
Chris Wu is a 68 y.o. male on day 13 of admission presenting with Encephalopathy.      Subjective   Pt is sitting on edge of the bed. He is oriented to year, hospital, and self. He provides his outpatient 's phone number to  me. He is focused on resuming benzo that his PCP writes him. He reports it helps with his tremors.        Objective     Last Recorded Vitals  /71 (Patient Position: Sitting)   Pulse 100   Temp 36.5 °C (97.7 °F) (Temporal)   Resp 17   Wt 76 kg (167 lb 8.8 oz)   SpO2 92%   Intake/Output last 3 Shifts:    Intake/Output Summary (Last 24 hours) at 3/14/2024 1906  Last data filed at 3/14/2024 1330  Gross per 24 hour   Intake 480 ml   Output 275 ml   Net 205 ml         Admission Weight  Weight: 72.6 kg (160 lb) (03/01/24 1533)    Daily Weight  03/11/24 : 76 kg (167 lb 8.8 oz)    Image Results  ECG 12 lead  Sinus rhythm with Premature atrial complexes  Left axis deviation  Septal infarct , age undetermined  Abnormal ECG  When compared with ECG of 02-MAY-2022 00:06,  Premature atrial complexes are now Present  Septal infarct is now Present  See ED provider note for full interpretation and clinical correlation  Confirmed by Karen Thibodeaux ([id number]) on 3/10/2024 12:39:54 PM      Physical Exam  Constitutional: resting comfortably in bed, no acute distress   Eyes: making eye contact  Respiratory/Thorax: CTA bilaterally, no acute respiratory distress  Cardiovascular: regular rate and rhythm  Gastrointestinal: Nondistended, soft, non-tender  Musculoskeletal: no significant peripheral edema appreciated   Neurological: Alert, oriented to self, hospital, year.  Disoriented to event.  Remains confused regarding situation  Psychological: Calm, thought content more clear, remains mildly tangential   Skin: Warm and dry    Relevant Results  Scheduled medications  apixaban, 5 mg, oral, BID  atorvastatin, 80 mg, oral, Nightly  buprenorphine-naloxone, 1 tablet, sublingual,  BID  carbidopa-levodopa, 1 tablet, oral, TID  docusate sodium, 100 mg, oral, BID  escitalopram, 20 mg, oral, Daily  fluticasone furoate-vilanteroL, 1 puff, inhalation, Daily  folic acid, 1 mg, oral, Daily  gabapentin, 300 mg, oral, TID  LORazepam, 0.5 mg, oral, q8h MASON  nicotine, 1 patch, transdermal, Daily  pantoprazole, 40 mg, intravenous, Daily  potassium chloride, 20 mEq, oral, Daily  potassium chloride CR, 20 mEq, oral, Once  predniSONE, 10 mg, oral, Daily  thiamine, 100 mg, oral, Daily  tiotropium, 2 puff, inhalation, Daily  [Held by provider] torsemide, 10 mg, oral, Daily  [Held by provider] traZODone, 100 mg, oral, Nightly      ECG 12 lead    Result Date: 3/10/2024  Sinus rhythm with Premature atrial complexes Left axis deviation Septal infarct , age undetermined Abnormal ECG When compared with ECG of 02-MAY-2022 00:06, Premature atrial complexes are now Present Septal infarct is now Present See ED provider note for full interpretation and clinical correlation Confirmed by Karen Thibodeaux ([id number]) on 3/10/2024 12:39:54 PM    Lower extremity venous duplex bilateral    Result Date: 3/4/2024  Interpreted By:  Alexx Ellsworth, STUDY: [facility] LOWER EXTREMITY VENOUS DUPLEX BILATERAL  3/4/2024 1:27 pm   INDICATION: Elevated D-dimer.   COMPARISON: 03/02/2024.   ACCESSION NUMBER(S): [id number]   ORDERING CLINICIAN: ANTWON RIOS   TECHNIQUE: Routine ultrasound of the  bilateral lower extremity was performed with duplex Doppler (color and spectral) evaluation.  Static images were obtained for remote interpretation.   FINDINGS: RIGHT LEG: The  right common femoral, femoral, popliteal, proximal medial saphenous, and deep femoral veins are patent and free of thrombus. The veins are normally compressible.  They demonstrate normal phasic flow and augmentation response.   Visualized segments of the right calf posterior tibial and peroneal vein segments appear patent and compressible without evidence of thrombus.    LEFT LEG: Study is positive for left lower extremity DVT. The left common femoral vein is partially compressible containing nonocclusive thrombus. The left femoral vein is noncompressible containing thrombus, portions of which appear occlusive or near occlusive in the mid-distal segments. The left popliteal vein is incompletely compressible containing nonocclusive thrombus.   Thrombus is also evident within the left calf vein posterior tibial vein and peroneal vein segments.       POSITIVE FOR LEFT LOWER EXTREMITY DVT. Thrombus demonstrated in the left common femoral vein, femoral vein, and popliteal vein as well as the posterior tibial and peroneal vein segments of the left calf. Some areas of thrombus appear occlusive or near occlusive in these regions.   No evidence of right lower extremity DVT.   MACRO: Alexx Ellsworth discussed the significance and urgency of this critical finding via secure chat with  ANTOWN RIOS on 3/4/2024 at 4:21 pm.  (**-RCF-**) Findings:  See findings.   Signed by: Alexx Ellsworth 3/4/2024 4:22 PM Dictation workstation:   [id number]    Lower extremity venous duplex bilateral    Result Date: 3/2/2024  Interpreted By:  Blu Xiong, STUDY: [facility] LOWER EXTREMITY VENOUS DUPLEX BILATERAL  3/2/2024 7:48 pm   INDICATION: 69 y/o   M with  Signs/Symptoms:r/o DVT. LMP:  Unknown.   COMPARISON: None.   ACCESSION NUMBER(S): [id number]   ORDERING CLINICIAN: SUGEY HURTADO   TECHNIQUE: Routine ultrasound of the  right lower extremity was performed with duplex Doppler (color and spectral) evaluation.   Static images were obtained for remote interpretation.   FINDINGS: Examination significantly limited due to patient condition.   Patency demonstrated from the left femoral vein proximally down to below the knee.         Limited examination due to patient condition. No DVT detected   MACRO: None   Signed by: Blu Xiong 3/2/2024 8:18 PM Dictation workstation:   [id number]    CT angio head  and neck w and wo IV contrast    Result Date: 3/1/2024  Interpreted By:  Johnathon Higuera, STUDY: CT ANGIO HEAD AND NECK W AND WO IV CONTRAST;  3/1/2024 11:21 pm   INDICATION: Signs/Symptoms:R/O STROKE.   COMPARISON: Correlation made to noncontrast head CT of 03/01/2024.   ACCESSION NUMBER(S): [id number]   ORDERING CLINICIAN: SUGEY HURTADO   TECHNIQUE: Unenhanced CT images of the head were obtained. Subsequently, 75 cc Omnipaque 350 were administered intravenously and axial images of the head and neck were acquired.  Coronal, sagittal, and 3-D reconstructions were provided for review.   FINDINGS: Noncontrast head CT: Moderate to advanced volume loss.  There is periventricular and subcortical white matter hypoattenuation, most in keeping with chronic microvascular ischemic change. Gray-white matter differentiation is maintained. No evidence of acute intracranial hemorrhage. No mass, mass effect, midline shift, hydrocephalus, or abnormal extra-axial collection. Paranasal sinuses and mastoids are clear. Skull is within normal limits. Visible extracranial soft tissues including the orbits appear within normal limits.   CTA HEAD FINDINGS:   Anterior circulation: The bilateral intracranial internal carotid arteries, bilateral carotid terminals, bilateral proximal anterior and middle cerebral arteries are normal.   Posterior circulation: Bilateral intracranial vertebral arteries, vertebrobasilar junction, basilar artery and proximal posterior cerebral arteries are normal.   No intracranial saccular aneurysm or other abnormal intracranial enhancement is seen.   CTA NECK FINDINGS:   Right carotid vessels: Mild partially calcified plaque in the common carotid artery without significant luminal narrowing. Mild-to-moderate plaque in the bifurcation without significant luminal narrowing. Moderate to severe plaque in the carotid bulb causing narrowing of between 20 and 30% by NASCET criteria. The more distal cervical ICA is of normal  caliber without hemodynamically significant luminal narrowing.   Left carotid vessels: Mild partially calcified plaque in the common carotid artery without significant luminal narrowing. Mild-to-moderate plaque in the bifurcation without significant luminal narrowing. Moderate to severe plaque in the carotid bulb causing narrowing of between 20 and 30% by NASCET criteria. The more distal cervical ICA is of normal caliber without hemodynamically significant luminal narrowing.   Vertebral vessels: Mild calcific plaque at the vertebral artery origins without significant narrowing. The visualized segments of the cervical vertebral arteries are normal in caliber. Right vertebral artery is dominant.   ACDF changes noted. Severe emphysema. Biapical pleuroparenchymal scarring.       No evidence of acute intracranial hemorrhage or cortical infarct on noncontrast CT.   No evidence for hemodynamically significant stenosis of the cervical vessels. Mild atherosclerotic narrowing in the bilateral carotid bulbs measuring between 20 and 30% by NASCET criteria.   No evidence for significant stenosis or large branch vessel cutoffs of the intracranial vessels.   Incidentally noted severe emphysema in the visible lungs.   MACRO: None   Signed by: Johnathon Higuera 3/1/2024 11:46 PM Dictation workstation:   [id number]    CT thoracic spine wo IV contrast    Result Date: 3/1/2024  STUDY: CT Chest, Abdomen, and Pelvis with IV Contrast, CT Reconstruction Thoracic Spine and Lumbar Spine; 3/1/2024 at 5:58 p.m. INDICATION: Possible fall.  Altered mental status. COMPARISON: Two-view CXR 1/23/2024.  CT chest 8/1/2022.  US gallbladder 8/27/2020. ACCESSION NUMBER(S): [id number], [id number], [id number] ORDERING CLINICIAN: HUNTER ABBASI TECHNIQUE: CT of the chest, abdomen, and pelvis was performed.  Contiguous axial images were obtained at 3 mm slice thickness through the chest, abdomen, and pelvis.  Coronal and sagittal reconstructions at 3 mm  slice thickness were performed.  Omnipaque 350 75 mL was administered intravenously.  Please note that spinal images were generated from the original CT abdomen and pelvis imaging. FINDINGS: CHEST: MEDIASTINUM: The heart is normal in size without pericardial effusion.  Central vascular structures opacify normally.  LUNGS/PLEURA: There is no pleural effusion, pleural thickening, or pneumothorax. The airways are patent. There is a 6 mm nodule in the right upper lobe. This was present on the previous examination and is unchanged. Lungs are clear without consolidation, interstitial disease, or suspicious nodules. LYMPH NODES: Thoracic lymph nodes are not enlarged. ABDOMEN:  LIVER: No hepatomegaly.  Smooth surface contour.  Normal attenuation.  BILE DUCTS: No intrahepatic or extrahepatic biliary ductal dilatation.  GALLBLADDER: The gallbladder is unremarkable. STOMACH: No abnormalities identified.  PANCREAS: No masses or ductal dilatation.  SPLEEN: No splenomegaly or focal splenic lesion.  ADRENAL GLANDS: No thickening or nodules.  KIDNEYS AND URETERS: Kidneys are normal in size and location.  No renal or ureteral calculi.  PELVIS:  BLADDER: No abnormalities identified.  REPRODUCTIVE ORGANS: No abnormalities identified.  BOWEL: No abnormalities identified.  VESSELS: No abnormalities identified.  Abdominal aorta is normal in caliber.  PERITONEUM/RETROPERITONEUM/LYMPH NODES: No free fluid.  No pneumoperitoneum. No lymphadenopathy.  ABDOMINAL WALL: No abnormalities identified. SOFT TISSUES: No abnormalities identified.  BONES: No acute fracture or aggressive osseous lesion. THORACIC SPINE: The alignment is anatomic.  There is no fracture or traumatic subluxation. The vertebral body heights are well maintained.  Disc spaces are preserved.  No significant central canal stenosis is demonstrated. The neural foramina are patent throughout. There is mild degenerative change. The paravertebral soft tissues are within normal  limits. LUMBAR SPINE: There is mild degenerative scoliosis as well as mild anterolisthesis at L4-5. The spine is in otherwise good alignment.  There is no fracture or traumatic subluxation. The vertebral body heights are well maintained.  There is mild disc space during throughout the lumbar spine.  No significant central canal stenosis is demonstrated.  The right L4-5 neural foramen is slightly narrowed.  The paravertebral soft tissues are within normal limits.    1. No evidence for acute injury to the chest, abdomen, pelvis, thoracic, or lumbar spine. 2. There is degenerative change in the lumbar spine as described. 3. There is a stable 6 mm nodule in the right upper lobe. Signed by Robert Oquendo MD    CT lumbar spine wo IV contrast    Result Date: 3/1/2024  STUDY: CT Chest, Abdomen, and Pelvis with IV Contrast, CT Reconstruction Thoracic Spine and Lumbar Spine; 3/1/2024 at 5:58 p.m. INDICATION: Possible fall.  Altered mental status. COMPARISON: Two-view CXR 1/23/2024.  CT chest 8/1/2022.  US gallbladder 8/27/2020. ACCESSION NUMBER(S): [id number], [id number], [id number] ORDERING CLINICIAN: HUNTER ABBASI TECHNIQUE: CT of the chest, abdomen, and pelvis was performed.  Contiguous axial images were obtained at 3 mm slice thickness through the chest, abdomen, and pelvis.  Coronal and sagittal reconstructions at 3 mm slice thickness were performed.  Omnipaque 350 75 mL was administered intravenously.  Please note that spinal images were generated from the original CT abdomen and pelvis imaging. FINDINGS: CHEST: MEDIASTINUM: The heart is normal in size without pericardial effusion.  Central vascular structures opacify normally.  LUNGS/PLEURA: There is no pleural effusion, pleural thickening, or pneumothorax. The airways are patent. There is a 6 mm nodule in the right upper lobe. This was present on the previous examination and is unchanged. Lungs are clear without consolidation, interstitial disease, or  suspicious nodules. LYMPH NODES: Thoracic lymph nodes are not enlarged. ABDOMEN:  LIVER: No hepatomegaly.  Smooth surface contour.  Normal attenuation.  BILE DUCTS: No intrahepatic or extrahepatic biliary ductal dilatation.  GALLBLADDER: The gallbladder is unremarkable. STOMACH: No abnormalities identified.  PANCREAS: No masses or ductal dilatation.  SPLEEN: No splenomegaly or focal splenic lesion.  ADRENAL GLANDS: No thickening or nodules.  KIDNEYS AND URETERS: Kidneys are normal in size and location.  No renal or ureteral calculi.  PELVIS:  BLADDER: No abnormalities identified.  REPRODUCTIVE ORGANS: No abnormalities identified.  BOWEL: No abnormalities identified.  VESSELS: No abnormalities identified.  Abdominal aorta is normal in caliber.  PERITONEUM/RETROPERITONEUM/LYMPH NODES: No free fluid.  No pneumoperitoneum. No lymphadenopathy.  ABDOMINAL WALL: No abnormalities identified. SOFT TISSUES: No abnormalities identified.  BONES: No acute fracture or aggressive osseous lesion. THORACIC SPINE: The alignment is anatomic.  There is no fracture or traumatic subluxation. The vertebral body heights are well maintained.  Disc spaces are preserved.  No significant central canal stenosis is demonstrated. The neural foramina are patent throughout. There is mild degenerative change. The paravertebral soft tissues are within normal limits. LUMBAR SPINE: There is mild degenerative scoliosis as well as mild anterolisthesis at L4-5. The spine is in otherwise good alignment.  There is no fracture or traumatic subluxation. The vertebral body heights are well maintained.  There is mild disc space during throughout the lumbar spine.  No significant central canal stenosis is demonstrated.  The right L4-5 neural foramen is slightly narrowed.  The paravertebral soft tissues are within normal limits.    1. No evidence for acute injury to the chest, abdomen, pelvis, thoracic, or lumbar spine. 2. There is degenerative change in the  lumbar spine as described. 3. There is a stable 6 mm nodule in the right upper lobe. Signed by Robert Oquendo MD    CT chest abdomen pelvis w IV contrast    Result Date: 3/1/2024  STUDY: CT Chest, Abdomen, and Pelvis with IV Contrast, CT Reconstruction Thoracic Spine and Lumbar Spine; 3/1/2024 at 5:58 p.m. INDICATION: Possible fall.  Altered mental status. COMPARISON: Two-view CXR 1/23/2024.  CT chest 8/1/2022.  US gallbladder 8/27/2020. ACCESSION NUMBER(S): [id number], [id number], [id number] ORDERING CLINICIAN: HUNTER ABBASI TECHNIQUE: CT of the chest, abdomen, and pelvis was performed.  Contiguous axial images were obtained at 3 mm slice thickness through the chest, abdomen, and pelvis.  Coronal and sagittal reconstructions at 3 mm slice thickness were performed.  Omnipaque 350 75 mL was administered intravenously.  Please note that spinal images were generated from the original CT abdomen and pelvis imaging. FINDINGS: CHEST: MEDIASTINUM: The heart is normal in size without pericardial effusion.  Central vascular structures opacify normally.  LUNGS/PLEURA: There is no pleural effusion, pleural thickening, or pneumothorax. The airways are patent. There is a 6 mm nodule in the right upper lobe. This was present on the previous examination and is unchanged. Lungs are clear without consolidation, interstitial disease, or suspicious nodules. LYMPH NODES: Thoracic lymph nodes are not enlarged. ABDOMEN:  LIVER: No hepatomegaly.  Smooth surface contour.  Normal attenuation.  BILE DUCTS: No intrahepatic or extrahepatic biliary ductal dilatation.  GALLBLADDER: The gallbladder is unremarkable. STOMACH: No abnormalities identified.  PANCREAS: No masses or ductal dilatation.  SPLEEN: No splenomegaly or focal splenic lesion.  ADRENAL GLANDS: No thickening or nodules.  KIDNEYS AND URETERS: Kidneys are normal in size and location.  No renal or ureteral calculi.  PELVIS:  BLADDER: No abnormalities identified.  REPRODUCTIVE  ORGANS: No abnormalities identified.  BOWEL: No abnormalities identified.  VESSELS: No abnormalities identified.  Abdominal aorta is normal in caliber.  PERITONEUM/RETROPERITONEUM/LYMPH NODES: No free fluid.  No pneumoperitoneum. No lymphadenopathy.  ABDOMINAL WALL: No abnormalities identified. SOFT TISSUES: No abnormalities identified.  BONES: No acute fracture or aggressive osseous lesion. THORACIC SPINE: The alignment is anatomic.  There is no fracture or traumatic subluxation. The vertebral body heights are well maintained.  Disc spaces are preserved.  No significant central canal stenosis is demonstrated. The neural foramina are patent throughout. There is mild degenerative change. The paravertebral soft tissues are within normal limits. LUMBAR SPINE: There is mild degenerative scoliosis as well as mild anterolisthesis at L4-5. The spine is in otherwise good alignment.  There is no fracture or traumatic subluxation. The vertebral body heights are well maintained.  There is mild disc space during throughout the lumbar spine.  No significant central canal stenosis is demonstrated.  The right L4-5 neural foramen is slightly narrowed.  The paravertebral soft tissues are within normal limits.    1. No evidence for acute injury to the chest, abdomen, pelvis, thoracic, or lumbar spine. 2. There is degenerative change in the lumbar spine as described. 3. There is a stable 6 mm nodule in the right upper lobe. Signed by Robert Oquendo MD    XR chest 1 view    Result Date: 3/1/2024  STUDY: Chest Radiograph;  3/1/24 at 5:47 PM INDICATION: Altered mental status. COMPARISON: Chest XR 1/23/24. ACCESSION NUMBER(S): [id number] ORDERING CLINICIAN: HUNTER MATUTE WIRE TECHNIQUE:  Frontal chest was obtained at 1746 hours. (two images) FINDINGS: CARDIOMEDIASTINAL SILHOUETTE: Cardiomediastinal silhouette is normal in size and configuration.  LUNGS: Lungs are clear.  ABDOMEN: No remarkable upper abdominal findings.  BONES: No acute  osseous changes.    No acute pulmonary pathology. Signed by Robert Oquendo MD    XR pelvis 1-2 views    Result Date: 3/1/2024  STUDY: Pelvis Radiographs; 3/1/24 at 5:47 PM INDICATION: Left hip pain. COMPARISON: Left hip XR 4/30/16. ACCESSION NUMBER(S): [id number] ORDERING CLINICIAN: HUNTER ABBASI TECHNIQUE:  Two view(s) of the pelvis. FINDINGS:  The pelvic ring is intact.  There is no acute fracture. There are degenerative changes lower lumbosacral spine.  There is an inferior vena cava filter present.    No acute osseous abnormalities. Signed by Be Hare MD    CT head wo IV contrast    Result Date: 3/1/2024  Interpreted By:  Johnathon Higuera, STUDY: CT HEAD WO IV CONTRAST; CT CERVICAL SPINE WO IV CONTRAST; ;  3/1/2024 5:38 pm   INDICATION: Signs/Symptoms:ams possible fall.   COMPARISON: Noncontrast CT head of 05/05/2021. CT cervical spine of 06/04/2010.   ACCESSION NUMBER(S): [id number]; [id number]   ORDERING CLINICIAN: HUNTER ABBASI   TECHNIQUE: Noncontrast CT exams of the head and cervical spine with multiplanar reformations.   FINDINGS: BRAIN PARENCHYMA: Moderate to advanced volume loss.  There is periventricular and subcortical white matter hypoattenuation, most in keeping with chronic microvascular ischemic change. Cystic spaces in the bilateral corpus striatum probably representing chronic lacunar infarcts. Gray-white matter interfaces are preserved. No mass, mass effect or midline shift.   HEMORRHAGE: No acute intracranial hemorrhage. VENTRICLES and EXTRA-AXIAL SPACES: Normal size. EXTRACRANIAL SOFT TISSUES: Within normal limits. PARANASAL SINUSES/MASTOIDS: The visualized paranasal sinuses and mastoid air cells are aerated. CALVARIUM: No depressed skull fracture. No destructive osseous lesion.   OTHER FINDINGS: None.   CERVICAL SPINE:   Mild reversal of the normal cervical lordosis could reflect positioning or muscle spasm. ALIGNMENT: New trace degenerative anterolisthesis at C2-C3. Alignment is  otherwise normal. VERTEBRAE: Acute fracture. Vertebral stature is maintained. Redemonstrated ACDF changes at C5-C7. Severe discogenic degeneration with endplate irregularity and osteophytosis and uncovertebral hypertrophy at C3-C4 and C4-C5. Moderate hypertrophic facet osteoarthropathy excepting on the right at C 2 C3 where it is severe in degree. SPINAL CANAL: No critical spinal canal stenosis. Moderate to severe degenerative canal narrowing at C4-C5 secondary to disc osteophyte bulging, similar to before. PREVERTEBRAL SOFT TISSUES: No prevertebral soft tissue swelling. LUNG APICES: Moderate to severe emphysema   OTHER FINDINGS: None.       No evidence of acute intracranial abnormality.   No acute cervical spine fracture or malalignment.     MACRO: None   Signed by: Johnathon Higuera 3/1/2024 6:09 PM Dictation workstation:   [id number]    CT cervical spine wo IV contrast    Result Date: 3/1/2024  Interpreted By:  Johnathon Higuera, STUDY: CT HEAD WO IV CONTRAST; CT CERVICAL SPINE WO IV CONTRAST; ;  3/1/2024 5:38 pm   INDICATION: Signs/Symptoms:ams possible fall.   COMPARISON: Noncontrast CT head of 05/05/2021. CT cervical spine of 06/04/2010.   ACCESSION NUMBER(S): [id number]; [id number]   ORDERING CLINICIAN: HUNTER ABBASI   TECHNIQUE: Noncontrast CT exams of the head and cervical spine with multiplanar reformations.   FINDINGS: BRAIN PARENCHYMA: Moderate to advanced volume loss.  There is periventricular and subcortical white matter hypoattenuation, most in keeping with chronic microvascular ischemic change. Cystic spaces in the bilateral corpus striatum probably representing chronic lacunar infarcts. Gray-white matter interfaces are preserved. No mass, mass effect or midline shift.   HEMORRHAGE: No acute intracranial hemorrhage. VENTRICLES and EXTRA-AXIAL SPACES: Normal size. EXTRACRANIAL SOFT TISSUES: Within normal limits. PARANASAL SINUSES/MASTOIDS: The visualized paranasal sinuses and mastoid air cells are aerated.  CALVARIUM: No depressed skull fracture. No destructive osseous lesion.   OTHER FINDINGS: None.   CERVICAL SPINE:   Mild reversal of the normal cervical lordosis could reflect positioning or muscle spasm. ALIGNMENT: New trace degenerative anterolisthesis at C2-C3. Alignment is otherwise normal. VERTEBRAE: Acute fracture. Vertebral stature is maintained. Redemonstrated ACDF changes at C5-C7. Severe discogenic degeneration with endplate irregularity and osteophytosis and uncovertebral hypertrophy at C3-C4 and C4-C5. Moderate hypertrophic facet osteoarthropathy excepting on the right at C 2 C3 where it is severe in degree. SPINAL CANAL: No critical spinal canal stenosis. Moderate to severe degenerative canal narrowing at C4-C5 secondary to disc osteophyte bulging, similar to before. PREVERTEBRAL SOFT TISSUES: No prevertebral soft tissue swelling. LUNG APICES: Moderate to severe emphysema   OTHER FINDINGS: None.       No evidence of acute intracranial abnormality.   No acute cervical spine fracture or malalignment.     MACRO: None   Signed by: Johnathon Higuera 3/1/2024 6:09 PM Dictation workstation:   [id number]     Continuous medications     PRN medications  PRN medications: acetaminophen, albuterol, nicotine polacrilex, oxygen, oxygen, QUEtiapine       Assessment/Plan      Chris Wu is a 68 y.o. male with pertinent hx of COPD, Parkinson's disorder, history of factor V Leiden mutation with DVT and PE in the past not on anticoagulation currently s/p IVC filter, depression/anxiety & PTSD & diastolic CHF who was admitted to the hospital secondary to acute encephalopathy.     Altered mental status/encephalopathy, 2/2 to  polypharmacy  Parkinson disease  Depression ADD/PTSD  ANDREA  Multiple imaging modalities pursued without etiology for altered mental status discovered.    Patient has limited contact with his family although per chart review had recent contact with daughter Orin.  She did note some concern for  cognitive decline during the conversations.    Multiple medication adjustments have been made since arrival with improvement in mentation.  Home medications that may be contributing to altered mentation:  Suboxone daily  BuSpar 20 mg 3 times daily  Sinemet  3 times daily  Lexapro 20 mg daily  Gabapentin 600 mg 3 times daily  Atarax 25 mg twice daily  Ativan 1 mg twice daily as needed for anxiety  Seroquel 50 mg 4 times daily as needed for anxiety  Trazodone 100 mg once daily at bedtime     Current medications:   Suboxone twice daily, dose unchanged  Sinemet twice daily, dose unchanged  Lexapro 20 mg daily, dose unchanged  Gabapentin 300 mg 3 times daily, dose decreased  Atarax discontinued  Lorazepam 0.5 mg every 8 hours, dose decreased (tapering)  Seroquel changed to 25 mg 4 times daily as needed for agitation, dose decreased  Trazodone discontinued    Per [facility] report, oxazepam and lorazepam started at the end of January.  He picked up prescriptions every week with pill count ranging from 28 to 56 pills.  He has been reliably picking up his Suboxone and gabapentin for at least the past couple of years.    Extensive left lower extremity DVT  3/4/24 POSITIVE FOR LEFT LOWER EXTREMITY DVT. Thrombus demonstrated in the left common femoral vein, femoral vein, and popliteal vein as well as the posterior tibial and peroneal vein segments of the left calf. Some areas of thrombus appear occlusive or near occlusive in these regions.      Continue Eliquis for treatment.    Lung nodule, incidental. 6 mm right upper lobe nodule  -Follow-up with pulmonology and primary care provider as an outpatient for further evaluation management.      Chronic hypoxemic respiratory failure  COPD without evidence of exacerbation  Patient has been prescribed prednisone 20 mg daily since May 2023.  Appears secondary to end stage COPD, he is tolerating reduced dose here.  Prednisone taper has been started here and he is currently on 10 mg  daily.    Hypokalemia, resolved      Inability to care for self  PT OT and social work consulted - recommending mod intensity. Requested process to be started.       GERD  Continue PPI    DVT prophylaxis eliquis       Disposition: Plan to discharge to SNF once encephalopathy has further improved.  Patient will require Medicaid application completion, he gave the OK to include his daughter in the application process. Anticipate prolonged admission in order to facilitate safe disposition.    Discussed with patient and RN  Discussed with SW  Call placed to pt's PCP, discussed with MA. Message left for PCP to return call 3/15    Time spent coordination of care 60 minutes         Chetna Archer, DO

## 2024-03-14 NOTE — PROGRESS NOTES
"Physical Therapy    Physical Therapy Treatment    Patient Name: Chris Wu  MRN: [id number]  Today's Date: 3/14/2024          Assessment/Plan   PT Assessment  PT Assessment Results: Decreased endurance, Impaired balance, Decreased cognition, Impaired judgement, Decreased safety awareness, Decreased mobility  Barriers to Discharge: cognition  End of Session Communication: Bedside nurse, PCT/NA/KEVON  End of Session Patient Position: Up in chair, Alarm on  PT Plan  Inpatient/Swing Bed or Outpatient: Inpatient  PT Plan  Treatment/Interventions: Bed mobility, Transfer training, Gait training, Balance training, Strengthening, Endurance training, Range of motion, Therapeutic exercise, Therapeutic activity  PT Plan: Skilled PT  PT Frequency: 3 times per week  PT Discharge Recommendations: Moderate intensity level of continued care  Equipment Recommended upon Discharge: Wheeled walker  PT Recommended Transfer Status: Assist x1  PT - OK to Discharge: Yes      General Visit Information:      General  Reason for Referral: change in mental status, impaired mobility  Referred By: Lyndsay Augustine APRN-CNP  Past Medical History Relevant to Rehab: PMH: COPD, Parkinson's disorder, history of factor V Leiden mutation with DVT and PE in the past & appears to not be on anticoagulation currently and is s/p IVC filter, depression/anxiety & PTSD & diastolic CHF  Missed Visit: No  Prior to Session Communication: Bedside nurse, PCT/NA/CTA  Patient Position Received: Bed, 3 rail up, Alarm on  Preferred Learning Style: verbal, visual  General Comment: AXOX1, appears restless, easily distracted, confused. needs encouragement to participate. \"I have to go home and pay my bills, my phone bills., take care of my cat, pay my bills, \" patient repeating self. on 02 3L no pulse ox reader available    Subjective   Precautions:     Vital Signs:       Objective   Pain:  Pain Assessment  Pain Assessment: 0-10  Cognition:  Cognition  Overall Cognitive " Status: Impaired at baseline, Impaired  Orientation Level: Disoriented to place, Disoriented to time, Disoriented to situation  Safety/Judgement: Exceptions to WFL  Impulsive: Mildly  Postural Control:  Dynamic Standing Balance  Dynamic Standing-Balance Support: No upper extremity supported (Min to CGA x1 witrh VC for safety and technique while washing hands)  Extremity/Trunk Assessments:    Activity Tolerance:  Activity Tolerance  Endurance: Tolerates 10 - 20 min exercise with multiple rests  Treatments:  Therapeutic Exercise  Therapeutic Exercise Performed: No (no follow through)              Bed Mobility  Bed Mobility: Yes  Bed Mobility 1  Bed Mobility 1: Supine to sitting, Scooting  Level of Assistance 1: Contact guard, Minimal tactile cues, Minimal verbal cues    Ambulation/Gait Training  Ambulation/Gait Training Performed: Yes  Ambulation/Gait Training 1  Surface 1: Level tile  Device 1:  (handheld x1 and environmental assist)  Assistance 1: Moderate assistance, Minimum assistance, Minimal verbal cues, Minimal tactile cues  Quality of Gait 1: Inconsistent stride length, Decreased step length, Scissoring, Soft knee(s) (impulsive and fatigues)  Comments/Distance (ft) 1: Ambulated 20ft x3 with  environment assist and min to CGA A x1 withVC for safety and technique. (unsteady, impulsive,)  Transfers  Transfer: Yes  Transfer 1  Transfer From 1: Sit to  Transfer to 1: Stand  Technique 1: Sit to stand, Stand to sit  Transfer Device 1:  (hanheld x1)  Transfer Level of Assistance 1: Minimum assistance  Trials/Comments 1:  (xompleted 3 STS)         Outcome Measures:  [facility] Basic Mobility  Turning from your back to your side while in a flat bed without using bedrails: None  Moving from lying on your back to sitting on the side of a flat bed without using bedrails: A little  Moving to and from bed to chair (including a wheelchair): A little  Standing up from a chair using your arms (e.g. wheelchair or bedside chair): A  little  To walk in hospital room: A lot  Climbing 3-5 steps with railing: A lot  Basic Mobility - Total Score: 17    Education Documentation  Precautions, taught by Laura Glass PTA at 3/14/2024 12:45 PM.  Learner: Patient  Readiness: Nonacceptance  Method: Explanation  Response: Needs Reinforcement  Comment: confused    Mobility Training, taught by Laura Glass PTA at 3/14/2024 12:45 PM.  Learner: Patient  Readiness: Nonacceptance  Method: Explanation  Response: Needs Reinforcement  Comment: confused    Education Comments  No comments found.        OP EDUCATION:       Encounter Problems       Encounter Problems (Active)       Balance       STG - Maintains static standing balance with unilateral upper extremity support x 5+ min in order to complete a functional task with SBA (Progressing)       Start:  03/04/24    Expected End:  03/18/24       INTERVENTIONS:  1. Practice standing with minimal support.  2. Educate patient about standing tolerance.  3. Educate patient about independence with gait, transfers, and ADL's.  4. Educate patient about use of assistive device.  5. Educate patient about self-directed care.            Mobility       STG - Patient will ambulate 50 ft x 3 with appropriate device and supervision (Progressing)       Start:  03/04/24    Expected End:  03/18/24               Transfers       STG - Transfer from bed to chair with appropriate device IND (Progressing)       Start:  03/04/24    Expected End:  03/18/24            STG - Patient to transfer to and from sit to supine IND (Progressing)       Start:  03/04/24    Expected End:  03/18/24            STG - Patient will transfer sit to and from stand with appropriate device IND (Progressing)       Start:  03/04/24    Expected End:  03/18/24

## 2024-03-14 NOTE — PROGRESS NOTES
Patient has Malnutrition Diagnosis: Yes  Diagnosis Status: New  Malnutrition Diagnosis: Severe malnutrition related to acute disease or injury  As Evidenced by: significant 5% weight loss/~ 1 week, loss of muscle and adipose stores, consuming less than 50% of estimtaed nutrition needs over past 5 days    Recommendation(s):  Encourage and assist PO as able  Nutrition within [facility]    Nutrition Assessment     68 y.o. male adm with Encephalopathy     (3/14/24) Pt noted with paranoia, NG no longer a safe option if PO not optimal, nursing unsure if he pt is taking Magic Cup, but today does not appear to be.     (3/111/24)  Energy Intake: Poor < 50 %  Food and Nutrient History: Pt with altered mental status , has sitter present, not able to provide history.  At beginning of hospital stay, pt was eating about 50%, now with minimal oral intakes x 2 days.  Pt only took a few bites of applesauce and yogurt this AM.    Difficulty chewing/swallowing: none noted       Per Flowsheet Percent Meal intake: 100, PO often 0-50%  Dietary Orders (From admission, onward)       Start     Ordered    24 1156  Oral nutritional supplements  Until discontinued        Question Answer Comment   Deliver with Breakfast    Deliver with Dinner    Select supplement: Magic Cup        24 1156    24  Adult diet Regular  Diet effective now        Question:  Diet type  Answer:  Regular    24                  Feeding assistance level: Independent after set-up    Current Facility-Administered Medications:     acetaminophen (Tylenol) tablet 650 mg, 650 mg, oral, q6h PRN, Renny Morrison DO    albuterol 2.5 mg /3 mL (0.083 %) nebulizer solution 2.5 mg, 2.5 mg, nebulization, q2h PRN, Malvin Wilcox DO, 2.5 mg at 24    [] apixaban (Eliquis) tablet 10 mg, 10 mg, oral, BID, 10 mg at 24 0951 **FOLLOWED BY** apixaban (Eliquis) tablet 5 mg, 5 mg, oral, BID, Malvin Wilcox DO, 5 mg at 24 1003     atorvastatin (Lipitor) tablet 80 mg, 80 mg, oral, Nightly, KENYATTA Odonnell-CNP, 80 mg at 03/12/24 2058    buprenorphine-naloxone (Suboxone) 8-2 mg per SL tablet 1 tablet, 1 tablet, sublingual, BID, Renny Morrison, DO, 1 tablet at 03/14/24 0959    carbidopa-levodopa (Sinemet)  mg per tablet 1 tablet, 1 tablet, oral, TID, Malvin Wilcox DO, 1 tablet at 03/14/24 1450    docusate sodium (Colace) capsule 100 mg, 100 mg, oral, BID, KENYATTA Odonnell-CNP, 100 mg at 03/14/24 1000    escitalopram (Lexapro) tablet 20 mg, 20 mg, oral, Daily, Malvin Wilcox DO, 20 mg at 03/14/24 0959    fluticasone furoate-vilanteroL (Breo Ellipta) 200-25 mcg/dose inhaler 1 puff, 1 puff, inhalation, Daily, Malvin Wilcox DO, 1 puff at 03/14/24 0958    folic acid (Folvite) tablet 1 mg, 1 mg, oral, Daily, KENYATTA Odonnell-CNP, 1 mg at 03/14/24 0959    gabapentin (Neurontin) capsule 300 mg, 300 mg, oral, TID, Malvin Wilcox DO, 300 mg at 03/14/24 1450    LORazepam (Ativan) tablet 0.5 mg, 0.5 mg, oral, q8h MASON, KENYATTA Smith-CNP, 0.5 mg at 03/14/24 1450    nicotine (Nicoderm CQ) 21 mg/24 hr patch 1 patch, 1 patch, transdermal, Daily, Malvin Wilcox DO, 1 patch at 03/14/24 1004    nicotine polacrilex (Nicorette) gum 2 mg, 2 mg, Mouth/Throat, q2h PRN, Chetna Archer, , 2 mg at 03/14/24 1450    oxygen (O2) therapy, , inhalation, Continuous PRN - O2/gases, Robert Hansen, DO, 3 L/min at 03/01/24 2201    oxygen (O2) therapy, , inhalation, Continuous PRN - O2/gases, Lyndsay Augustine APRN-CNP, 3 L/min at 03/13/24 2200    pantoprazole (ProtoNix) injection 40 mg, 40 mg, intravenous, Daily, Lyndsay Augustine APRN-CNP, 40 mg at 03/14/24 0939    potassium chloride (Klor-Con) packet 20 mEq, 20 mEq, oral, Daily, Lyndsay Augustine APRN-CNP, 20 mEq at 03/14/24 1000    potassium chloride CR (Klor-Con M20) ER tablet 20 mEq, 20 mEq, oral, Once, Renny Morrison DO    predniSONE (Deltasone) tablet 10 mg, 10 mg, oral, Daily, Renny Morrison DO,  "10 mg at 03/14/24 1000    QUEtiapine (SEROquel) tablet 25 mg, 25 mg, oral, 4x daily PRN, Malvin Wilcox DO, 25 mg at 03/14/24 1522    thiamine (Vitamin B-1) tablet 100 mg, 100 mg, oral, Daily, KENYATTA Odonnell-CNP, 100 mg at 03/14/24 1000    tiotropium (Spiriva Respimat) 2.5 mcg/actuation inhaler 2 puff, 2 puff, inhalation, Daily, 2 puff at 03/14/24 0959 **AND** [DISCONTINUED] fluticasone furoate-vilanteroL (Breo Ellipta) 200-25 mcg/dose inhaler 1 puff, 1 puff, inhalation, Daily, DOMINIQUE Odonnell, 1 puff at 03/04/24 0911    [Held by provider] torsemide (Demadex) tablet 10 mg, 10 mg, oral, Daily, Malvin Wilcox DO, 10 mg at 03/05/24 0857    [Held by provider] traZODone (Desyrel) tablet 100 mg, 100 mg, oral, Nightly, Malvin Wilcox DO, 100 mg at 03/06/24 2148  Results from last 7 days   Lab Units 03/12/24  0605 03/11/24  0627 03/10/24  0734   GLUCOSE mg/dL 85 80 81   SODIUM mmol/L 138 137 138   POTASSIUM mmol/L 3.7 3.7 3.5   CHLORIDE mmol/L 95* 94* 94*   CO2 mmol/L 36* 36* 37*   BUN mg/dL 13 11 10   CREATININE mg/dL 0.69 0.71 0.68   EGFR mL/min/1.73m*2 >90 >90 >90   CALCIUM mg/dL 8.4* 8.3* 8.3*   PHOSPHORUS mg/dL 3.4 3.4 3.8   MAGNESIUM mg/dL 1.96 2.08 1.99     No results found for: \"HGBA1C\"  Results from last 7 days   Lab Units 03/08/24  0140   POCT GLUCOSE mg/dL 119*       Per Flowsheet:  Gastrointestinal  Gastrointestinal (WDL): Within Defined Limits  Last bowel movement documented:    Past Medical History:   Diagnosis Date    Abnormal weight loss     Weight loss    Activated protein C resistance (CMS/HCC)     Heterozygous factor V Leiden mutation    Cervicalgia     Neck pain    Chronic sinusitis, unspecified     Sinusitis    Encounter for screening for malignant neoplasm of colon 03/18/2016    Encounter for screening for malignant neoplasm of colon    Muscle weakness (generalized)     Muscle weakness    Opioid use, unspecified, uncomplicated     Opioid use    Other conditions influencing health " "status     A Fall    Pain in unspecified limb     Limb pain    Pain in unspecified shoulder     Pain, joint, shoulder    Paresthesia of skin     Tingling    Personal history of other diseases of the nervous system and sense organs     History of migraine headaches    Personal history of other diseases of the respiratory system     History of pleurisy    Personal history of other mental and behavioral disorders     History of psychosis    Personal history of other specified conditions     History of nausea    Personal history of other specified conditions     History of dizziness    Personal history of other specified conditions     History of fatigue    Personal history of other venous thrombosis and embolism     History of deep venous thrombosis    Personal history of pulmonary embolism     History of pulmonary embolism    Unspecified abdominal pain 04/15/2016    Abdominal spasms      Past Surgical History:   Procedure Laterality Date    BACK SURGERY  09/19/2013    Lower Back Surgery    NECK SURGERY  09/19/2013    Neck Surgery    OTHER SURGICAL HISTORY  04/15/2016    Interruption Inferior Vena Cava Pat Filter Placement      Allergies: Aspirin     Anthropometrics:  Height: 175 cm (5' 8.9\")  Weight: 76 kg (167 lb 8.8 oz)  BMI (Calculated): 24.82         Ideal Body Weight: 72.7 kg   %IBW: 105 %        Daily Weight  03/11/24 : 76 kg (167 lb 8.8 oz)  01/25/24 : 80.3 kg (177 lb)  01/23/24 : 79.4 kg (175 lb)  01/02/24 : 81.2 kg (179 lb)  11/20/23 : 79.4 kg (175 lb)  11/15/23 : 78 kg (172 lb)  11/08/23 : 78.9 kg (174 lb)  11/07/23 : 77.1 kg (170 lb)  11/01/23 : 79.4 kg (175 lb)  05/24/23 : 62.1 kg (137 lb)    Weight History / % Weight Change: 80 kg (3/1/24); 80.3 kg (1/25/24); 79.4 kg (11/20/23); 61.7 kg (3/6/23) ? accuracy  Significant Weight Loss: Yes  Interpretation of Weight Loss: >2% in 1 week (down 5%/1 week)    Skin: Skin tear right ear, left ear pressure injury(please see nursing/wound notes for further " details)  Edema: +1 BLE  Pain Score: 0 - No pain  PAINAD Score:  [0]     Estimated Nutritional Needs:  Method for Estimating Needs: 1900 kcal (25 kcal/kg)    Method for Estimating Needs:  g protein (1.2-1.5 g/kg)    Method for Estimating Needs: 1 ml/kcal    Nutrition Focused Physical Findings: (3/11)  Orbital Fat Pads: Well nourshed (slightly bulging fat pads)  Buccal Fat Pads: Mild-Moderate (flat cheeks, minimal bounce)    Temporalis: Mild-Moderate (slight depression)  Pectoralis (Clavicular Region): Mild-Moderate (some protrusion of clavicle)    Nutrition Diagnosis   Patient has Malnutrition Diagnosis: Yes  Diagnosis Status: New  Malnutrition Diagnosis: Severe malnutrition related to acute disease or injury  As Evidenced by: significant 5% weight loss/~ 1 week, loss of muscle and adipose stores, consuming less than 50% of estimtaed nutrition needs over past 5 days      Nutrition Interventions/Recommendations   Intervention:  Coordination of Care: Cheikh PLUMMER, Robert BRODERICK  Interventions: Medical food supplement  Goal: Magic cup 2 x/day= 580 kcal, 18 g protein  Individualized Nutrition Prescription Provided for : diet, fluids    Education Documentation  Available if needs arise.  Planned discharge to SNF where meals and meds will be provided/managed    Goals: oral intake >50% and consume prescribed supplements       Nutrition Monitoring and Evaluation   Weights  Labs  PO intake  Supplement intake  Nutrition GOC

## 2024-03-14 NOTE — SIGNIFICANT EVENT
"Pt lacks capacity and therefore cannot leave AMA     He is tangential and paranoid at present. He is hyper focused on \"workman's comp\" and wants me to \"have his back and he will have mine\". He is agreeable to SNF for rehab purposes at present. Will work on arranging this - already discussed with SW today.     Discussed with nurse, he is aware that the patient may not leave AMA due to lacking capacity.     Chetna Archer, DO   "

## 2024-03-15 ENCOUNTER — APPOINTMENT (OUTPATIENT)
Dept: RADIOLOGY | Facility: HOSPITAL | Age: 69
DRG: 091 | End: 2024-03-15
Payer: MEDICARE

## 2024-03-15 LAB
ALBUMIN SERPL BCP-MCNC: 3.4 G/DL (ref 3.4–5)
ANION GAP SERPL CALC-SCNC: 10 MMOL/L (ref 10–20)
APTT PPP: 36 SECONDS (ref 27–38)
BUN SERPL-MCNC: 9 MG/DL (ref 6–23)
CALCIUM SERPL-MCNC: 8.2 MG/DL (ref 8.6–10.3)
CHLORIDE SERPL-SCNC: 98 MMOL/L (ref 98–107)
CO2 SERPL-SCNC: 36 MMOL/L (ref 21–32)
CREAT SERPL-MCNC: 0.79 MG/DL (ref 0.5–1.3)
EGFRCR SERPLBLD CKD-EPI 2021: >90 ML/MIN/1.73M*2
ERYTHROCYTE [DISTWIDTH] IN BLOOD BY AUTOMATED COUNT: 12.4 % (ref 11.5–14.5)
GLUCOSE SERPL-MCNC: 96 MG/DL (ref 74–99)
HCT VFR BLD AUTO: 38 % (ref 41–52)
HGB BLD-MCNC: 11.7 G/DL (ref 13.5–17.5)
INR PPP: 1.3 (ref 0.9–1.1)
MAGNESIUM SERPL-MCNC: 1.77 MG/DL (ref 1.6–2.4)
MCH RBC QN AUTO: 28.1 PG (ref 26–34)
MCHC RBC AUTO-ENTMCNC: 30.8 G/DL (ref 32–36)
MCV RBC AUTO: 91 FL (ref 80–100)
NRBC BLD-RTO: 0 /100 WBCS (ref 0–0)
PHOSPHATE SERPL-MCNC: 2.8 MG/DL (ref 2.5–4.9)
PLATELET # BLD AUTO: 183 X10*3/UL (ref 150–450)
POTASSIUM SERPL-SCNC: 3.5 MMOL/L (ref 3.5–5.3)
PROTHROMBIN TIME: 14.3 SECONDS (ref 9.8–12.8)
RBC # BLD AUTO: 4.17 X10*6/UL (ref 4.5–5.9)
SODIUM SERPL-SCNC: 140 MMOL/L (ref 136–145)
UFH PPP CHRO-ACNC: 0.4 IU/ML
UFH PPP CHRO-ACNC: 0.9 IU/ML
UFH PPP CHRO-ACNC: 1.4 IU/ML
WBC # BLD AUTO: 6.1 X10*3/UL (ref 4.4–11.3)

## 2024-03-15 PROCEDURE — 99233 SBSQ HOSP IP/OBS HIGH 50: CPT | Performed by: STUDENT IN AN ORGANIZED HEALTH CARE EDUCATION/TRAINING PROGRAM

## 2024-03-15 PROCEDURE — 2500000001 HC RX 250 WO HCPCS SELF ADMINISTERED DRUGS (ALT 637 FOR MEDICARE OP)

## 2024-03-15 PROCEDURE — 2500000004 HC RX 250 GENERAL PHARMACY W/ HCPCS (ALT 636 FOR OP/ED): Performed by: STUDENT IN AN ORGANIZED HEALTH CARE EDUCATION/TRAINING PROGRAM

## 2024-03-15 PROCEDURE — C9113 INJ PANTOPRAZOLE SODIUM, VIA: HCPCS | Performed by: NURSE PRACTITIONER

## 2024-03-15 PROCEDURE — 2500000004 HC RX 250 GENERAL PHARMACY W/ HCPCS (ALT 636 FOR OP/ED): Performed by: NURSE PRACTITIONER

## 2024-03-15 PROCEDURE — 85730 THROMBOPLASTIN TIME PARTIAL: CPT | Performed by: STUDENT IN AN ORGANIZED HEALTH CARE EDUCATION/TRAINING PROGRAM

## 2024-03-15 PROCEDURE — 2500000002 HC RX 250 W HCPCS SELF ADMINISTERED DRUGS (ALT 637 FOR MEDICARE OP, ALT 636 FOR OP/ED): Performed by: FAMILY MEDICINE

## 2024-03-15 PROCEDURE — 2500000001 HC RX 250 WO HCPCS SELF ADMINISTERED DRUGS (ALT 637 FOR MEDICARE OP): Performed by: NURSE PRACTITIONER

## 2024-03-15 PROCEDURE — 2500000002 HC RX 250 W HCPCS SELF ADMINISTERED DRUGS (ALT 637 FOR MEDICARE OP, ALT 636 FOR OP/ED): Performed by: INTERNAL MEDICINE

## 2024-03-15 PROCEDURE — 83735 ASSAY OF MAGNESIUM: CPT | Performed by: STUDENT IN AN ORGANIZED HEALTH CARE EDUCATION/TRAINING PROGRAM

## 2024-03-15 PROCEDURE — 85027 COMPLETE CBC AUTOMATED: CPT | Performed by: STUDENT IN AN ORGANIZED HEALTH CARE EDUCATION/TRAINING PROGRAM

## 2024-03-15 PROCEDURE — 1200000002 HC GENERAL ROOM WITH TELEMETRY DAILY

## 2024-03-15 PROCEDURE — S4991 NICOTINE PATCH NONLEGEND: HCPCS | Performed by: FAMILY MEDICINE

## 2024-03-15 PROCEDURE — 80069 RENAL FUNCTION PANEL: CPT | Performed by: STUDENT IN AN ORGANIZED HEALTH CARE EDUCATION/TRAINING PROGRAM

## 2024-03-15 PROCEDURE — 2500000001 HC RX 250 WO HCPCS SELF ADMINISTERED DRUGS (ALT 637 FOR MEDICARE OP): Performed by: FAMILY MEDICINE

## 2024-03-15 PROCEDURE — 36415 COLL VENOUS BLD VENIPUNCTURE: CPT | Performed by: STUDENT IN AN ORGANIZED HEALTH CARE EDUCATION/TRAINING PROGRAM

## 2024-03-15 PROCEDURE — 97530 THERAPEUTIC ACTIVITIES: CPT | Mod: GO

## 2024-03-15 PROCEDURE — 70450 CT HEAD/BRAIN W/O DYE: CPT | Performed by: RADIOLOGY

## 2024-03-15 PROCEDURE — 94760 N-INVAS EAR/PLS OXIMETRY 1: CPT

## 2024-03-15 PROCEDURE — 2500000004 HC RX 250 GENERAL PHARMACY W/ HCPCS (ALT 636 FOR OP/ED): Performed by: INTERNAL MEDICINE

## 2024-03-15 PROCEDURE — 85520 HEPARIN ASSAY: CPT | Performed by: STUDENT IN AN ORGANIZED HEALTH CARE EDUCATION/TRAINING PROGRAM

## 2024-03-15 PROCEDURE — 70450 CT HEAD/BRAIN W/O DYE: CPT

## 2024-03-15 PROCEDURE — 2500000001 HC RX 250 WO HCPCS SELF ADMINISTERED DRUGS (ALT 637 FOR MEDICARE OP): Performed by: STUDENT IN AN ORGANIZED HEALTH CARE EDUCATION/TRAINING PROGRAM

## 2024-03-15 PROCEDURE — 85610 PROTHROMBIN TIME: CPT | Performed by: STUDENT IN AN ORGANIZED HEALTH CARE EDUCATION/TRAINING PROGRAM

## 2024-03-15 RX ORDER — HEPARIN SODIUM 5000 [USP'U]/ML
80 INJECTION, SOLUTION INTRAVENOUS; SUBCUTANEOUS ONCE
Status: COMPLETED | OUTPATIENT
Start: 2024-03-15 | End: 2024-03-15

## 2024-03-15 RX ORDER — HEPARIN SODIUM 10000 [USP'U]/100ML
0-4500 INJECTION, SOLUTION INTRAVENOUS CONTINUOUS
Status: DISCONTINUED | OUTPATIENT
Start: 2024-03-15 | End: 2024-03-16

## 2024-03-15 RX ORDER — HEPARIN SODIUM 5000 [USP'U]/ML
3000-6000 INJECTION, SOLUTION INTRAVENOUS; SUBCUTANEOUS EVERY 4 HOURS PRN
Status: DISCONTINUED | OUTPATIENT
Start: 2024-03-15 | End: 2024-03-16

## 2024-03-15 RX ADMIN — DOCUSATE SODIUM 100 MG: 100 CAPSULE, LIQUID FILLED ORAL at 20:12

## 2024-03-15 RX ADMIN — QUETIAPINE FUMARATE 25 MG: 25 TABLET ORAL at 18:19

## 2024-03-15 RX ADMIN — GABAPENTIN 300 MG: 300 CAPSULE ORAL at 20:12

## 2024-03-15 RX ADMIN — BUPRENORPHINE AND NALOXONE 1 TABLET: 8; 2 TABLET SUBLINGUAL at 09:59

## 2024-03-15 RX ADMIN — CARBIDOPA AND LEVODOPA 1 TABLET: 25; 100 TABLET ORAL at 20:12

## 2024-03-15 RX ADMIN — THIAMINE HCL TAB 100 MG 100 MG: 100 TAB at 09:58

## 2024-03-15 RX ADMIN — PREDNISONE 10 MG: 10 TABLET ORAL at 09:58

## 2024-03-15 RX ADMIN — GABAPENTIN 300 MG: 300 CAPSULE ORAL at 15:30

## 2024-03-15 RX ADMIN — FLUTICASONE FUROATE AND VILANTEROL TRIFENATATE 1 PUFF: 200; 25 POWDER RESPIRATORY (INHALATION) at 10:10

## 2024-03-15 RX ADMIN — NICOTINE POLACRILEX 2 MG: 2 GUM, CHEWING BUCCAL at 18:19

## 2024-03-15 RX ADMIN — FOLIC ACID 1 MG: 1 TABLET ORAL at 09:58

## 2024-03-15 RX ADMIN — LORAZEPAM 0.5 MG: 0.5 TABLET ORAL at 06:25

## 2024-03-15 RX ADMIN — ESCITALOPRAM OXALATE 20 MG: 20 TABLET ORAL at 09:59

## 2024-03-15 RX ADMIN — TIOTROPIUM BROMIDE INHALATION SPRAY 2 PUFF: 3.12 SPRAY, METERED RESPIRATORY (INHALATION) at 10:10

## 2024-03-15 RX ADMIN — LORAZEPAM 0.5 MG: 0.5 TABLET ORAL at 15:30

## 2024-03-15 RX ADMIN — DOCUSATE SODIUM 100 MG: 100 CAPSULE, LIQUID FILLED ORAL at 09:58

## 2024-03-15 RX ADMIN — ATORVASTATIN CALCIUM 80 MG: 80 TABLET, FILM COATED ORAL at 20:12

## 2024-03-15 RX ADMIN — HEPARIN SODIUM 1400 UNITS/HR: 10000 INJECTION, SOLUTION INTRAVENOUS at 14:12

## 2024-03-15 RX ADMIN — GABAPENTIN 300 MG: 300 CAPSULE ORAL at 09:59

## 2024-03-15 RX ADMIN — HEPARIN SODIUM 1100 UNITS/HR: 10000 INJECTION, SOLUTION INTRAVENOUS at 22:59

## 2024-03-15 RX ADMIN — CARBIDOPA AND LEVODOPA 1 TABLET: 25; 100 TABLET ORAL at 15:30

## 2024-03-15 RX ADMIN — BUPRENORPHINE AND NALOXONE 1 TABLET: 8; 2 TABLET SUBLINGUAL at 22:00

## 2024-03-15 RX ADMIN — PANTOPRAZOLE SODIUM 40 MG: 40 INJECTION, POWDER, FOR SOLUTION INTRAVENOUS at 10:06

## 2024-03-15 RX ADMIN — CARBIDOPA AND LEVODOPA 1 TABLET: 25; 100 TABLET ORAL at 09:58

## 2024-03-15 RX ADMIN — LORAZEPAM 0.5 MG: 0.5 TABLET ORAL at 22:00

## 2024-03-15 RX ADMIN — NICOTINE 1 PATCH: 21 PATCH, EXTENDED RELEASE TRANSDERMAL at 09:59

## 2024-03-15 RX ADMIN — HEPARIN SODIUM 6000 UNITS: 5000 INJECTION INTRAVENOUS; SUBCUTANEOUS at 14:19

## 2024-03-15 ASSESSMENT — COGNITIVE AND FUNCTIONAL STATUS - GENERAL
DRESSING REGULAR LOWER BODY CLOTHING: A LITTLE
DRESSING REGULAR UPPER BODY CLOTHING: A LITTLE
DAILY ACTIVITIY SCORE: 19
HELP NEEDED FOR BATHING: A LITTLE
TOILETING: A LOT
MOVING TO AND FROM BED TO CHAIR: A LITTLE
MOVING TO AND FROM BED TO CHAIR: A LITTLE
STANDING UP FROM CHAIR USING ARMS: A LITTLE
DAILY ACTIVITIY SCORE: 19
WALKING IN HOSPITAL ROOM: A LOT
CLIMB 3 TO 5 STEPS WITH RAILING: A LOT
WALKING IN HOSPITAL ROOM: A LOT
MOBILITY SCORE: 18
HELP NEEDED FOR BATHING: A LITTLE
DRESSING REGULAR UPPER BODY CLOTHING: A LITTLE
TOILETING: A LOT
CLIMB 3 TO 5 STEPS WITH RAILING: A LOT
MOBILITY SCORE: 18
DRESSING REGULAR LOWER BODY CLOTHING: A LITTLE
STANDING UP FROM CHAIR USING ARMS: A LITTLE

## 2024-03-15 ASSESSMENT — MONTREAL COGNITIVE ASSESSMENT (MOCA)
12. MEMORY INDEX SCORE: 0
4. NAME EACH OF THE THREE ANIMALS SHOWN: 2
13. ORIENTATION SUBSCORE: 2
5. MEMORY TRIALS: 0
10. [FLUENCY] NAME WORDS STARTING WITH DESIGNATED LETTER: 0
11. FOR EACH PAIR OF WORDS, WHAT CATEGORY DO THEY BELONG TO (OUT OF 2): 0
WHAT LEVEL OF EDUCATION WAS ATTAINED: 1
9. REPEAT EACH SENTENCE: 0
7. [VIGILENCE] TAP WHEN HEARING DESIGNATED LETTER: 0
6. READ LIST OF DIGITS [FORWARD/BACKWARD]: 0
8. SERIAL SUBTRACTION OF 7S: 0
WHAT IS THE TOTAL SCORE (OUT OF 30): 6
VISUOSPATIAL/EXECUTIVE SUBSCORE: 1

## 2024-03-15 ASSESSMENT — PAIN SCALES - GENERAL
PAINLEVEL_OUTOF10: 0 - NO PAIN
PAINLEVEL_OUTOF10: 0 - NO PAIN

## 2024-03-15 ASSESSMENT — PAIN - FUNCTIONAL ASSESSMENT
PAIN_FUNCTIONAL_ASSESSMENT: 0-10
PAIN_FUNCTIONAL_ASSESSMENT: 0-10

## 2024-03-15 NOTE — CARE PLAN
The patient's goals for the shift include  pt will have no neurological changes    The clinical goals for the shift include pt will be free of falls this shift    Pt resting in bed with 'cat'. Patient visiting with family at bedside. Visit from therapy dog.

## 2024-03-15 NOTE — PROGRESS NOTES
Occupational Therapy    MoCA testing    Name: Chris Wu  MRN: [id number]  : 1955  Date: 03/15/24  Time Calculation  Start Time: 1435  Stop Time: 1459  Time Calculation (min): 24 min    Plan:  Treatment Interventions: ADL retraining, Functional transfer training, Endurance training, Cognitive reorientation, Equipment evaluation/education, Compensatory technique education  OT Frequency: 3 times per week  OT Discharge Recommendations: Moderate intensity level of continued care  Equipment Recommended upon Discharge: Wheeled walker  OT Recommended Transfer Status: Assist of 1  OT - OK to Discharge: Yes    Outcome Measures:  MoCA  Visuospatial/Executive: 1  Namin  Memory (Score '0' as this is an Unscored Section): 0  Attention: Read List of Digits: 0  Attention: Read List of Letters: 0  Attention: Serial Sevens: 0  Language: Repeat: 0  Language: Fluency: 0  Abstraction: 0  Delayed Recall: 0  Orientation: 2  Add 1 Point if </=12 yr Education: 1  MOCA Total Score: 6    MoCA version 8.1 completed this date in pt's room with minimal distractions. Pt presented with delayed processing and required directions repeated for each section. Pt's overall score is categorized as Severe Cognitive Impairment (<10/30). Pt demonstrates impairments across all sections. Based on overall score and performance, OT recommendations would include: assistance and supervision of all medication and financial management, no further driving, increase the use of memory aides at home to improve daily function/independence and safety. Further cognitive testing may be warranted.     Goals:  Encounter Problems       Encounter Problems (Active)       OT Goals       Pt will demo functional transfers to/ from EOB, chair and commode sup and LRD (Progressing)       Start:  24    Expected End:  24            Pt will demo functional mobility necessary to complete ADL routine sup and LRD (Progressing)       Start:  24    Expected  End:  03/18/24            Pt will demo ADL routine and meaningful daily activities with sup using modifications as needed  (Progressing)       Start:  03/04/24    Expected End:  03/18/24            Pt will demo improved static/ dynamic standing balance, evidenced by completion of BADL or functional activity with < SB assist for duration of activity.   (Progressing)       Start:  03/04/24    Expected End:  03/18/24            Pt will demo improved activity tolerance evidenced by participation in BADL and/or functional mobility x10 mins with </=1 rest break.   (Progressing)       Start:  03/04/24    Expected End:  03/18/24

## 2024-03-15 NOTE — CONSULTS
Pharmacy Consult - medication review post fall     Patient's medications were reviewed and the following were identified as possible contributors as were given around 20:00 and can cause CNS depression:    Lorazepam   Quetiapine  Gabapentin  Suboxone     Based on chart review, the fall seems mechanical in nature and unlikely from above medications as patient has been taking them inpatient for over a week except for the as needed Seroquel that the patient received recently. Please feel free to reach out with any other questions.     Suzanne Sandra, PharmD  PGY-1 Pharmacy Resident

## 2024-03-15 NOTE — CARE PLAN
The patient's goals for the shift include      The clinical goals for the shift include pt will be free of falls during shift    Over the shift, the patient did not make progress toward the following goals. Barriers to progression include pt confusion. Recommendations to address these barriers include reorient as needed., maintain bed alarm at all times.    Problem: Safety  Goal: Patient will be injury free during hospitalization  Outcome: Not Progressing  Goal: I will remain free of falls  Outcome: Not Progressing        Pt had fall this shift with small skin tear to right elbow.  Pt educated, bed alarm maintained overight.

## 2024-03-15 NOTE — SIGNIFICANT EVENT
Called to assess the patient after a mechanical fall. The patient was witnessed by the  to have fallen down next to the trash can and slid to the floor. He has a scrape on his right elbow. He denies any pain elsewhere. He denies decreased ROM. He did not hit his head or have LOC. His mentation is at baseline. He was assisted back to bed and bed alarm activated. He is aware to call for help when needing to use the restroom.

## 2024-03-15 NOTE — NURSING NOTE
"Pt had a fall in his room after returning from MRI.  He stumbled and slid to the floor and has a small skin tear to the right elbow. He did not hit  his head or lose consciousness.  Pt has no complaints  of pain.  He is oriented to himself and place.  NP was called to bedside to evaluate pt.  A mepilex was applied to the skin  tear.  Vitals and blood sugar stable.  Pt states, \"I don't know what happened,  I'm  sorry.\"    Visit Vitals  /82   Pulse 85   Temp 36.4 °C (97.6 °F) (Temporal)   Resp 16        Pt denied need to have his emergency contact notified at this time.  "

## 2024-03-15 NOTE — PROGRESS NOTES
"Physical Therapy                 Therapy Communication Note    Patient Name: Chris Wu  MRN: [id number]  Today's Date: 3/15/2024     Discipline: Physical Therapy    Missed Visit Reason: Missed Visit Reason: Patient placed on medical hold (per [facility] in nursing \" hold therapy for now, he went for an MRI the reviewed 2 subdural hematomas and he just went for a ct scan to see how or if they are evolving, he also had a fall yesterday but no injuries or hitting of head\" . no tx.)    Missed Time: Attempt 0941    Comment:  "

## 2024-03-15 NOTE — PROGRESS NOTES
Chris Wu is a 68 y.o. male on day 14 of admission presenting with Encephalopathy.      Subjective   ON, patient had fall.     This morning, he is alert and oriented to self, hospital, and year. He denies any new symptoms. He appears restless.      Objective     Last Recorded Vitals  /61   Pulse 93   Temp 36.9 °C (98.4 °F)   Resp 17   Wt 76 kg (167 lb 8.8 oz)   SpO2 93%   Intake/Output last 3 Shifts:  No intake or output data in the 24 hours ending 03/15/24 1742      Admission Weight  Weight: 72.6 kg (160 lb) (03/01/24 1533)    Daily Weight  03/11/24 : 76 kg (167 lb 8.8 oz)    Image Results  CT head wo IV contrast  Narrative: Interpreted By:  Jassi Raygoza,   STUDY:  CT HEAD WO IV CONTRAST;  3/15/2024 9:16 am      INDICATION:  Signs/Symptoms:reassess subdural hematomas, fall.  Subdural hematoma  noted on MR      COMPARISON:  03/01/2024 head CT and 03/14/2024 brain MR      ACCESSION NUMBER(S):  [id number]      ORDERING CLINICIAN:  PEACE GILMORE      TECHNIQUE:  Noncontrast axial CT scan of head was performed. Angled reformats in  brain and bone windows were generated. The images were reviewed in  bone, brain, blood and soft tissue windows.      FINDINGS:  The subdural hematoma is noted along the temporal lobes bilaterally  on the prior MR exam are not clearly visualized on the CT due to  differences in imaging technique.      The sulci and ventricles are markedly prominent for the patient's  age, unchanged.      The white matter has confluent hypodensities corresponding to the  prior MR and CT exams. No acute hemorrhage or infarct is noted. No  mass is noted.      Impression: The subdural hemorrhages noted on the MR are not identified on the CT  due to differences in imaging technique.      No new acute infarct, hemorrhage or mass is otherwise noted.      MACRO:  None      Signed by: Jassi Raygoza 3/15/2024 10:32 AM  Dictation workstation:   [id number]  MR brain wo IV contrast  Narrative:  Interpreted By:  Deni Johnson,   STUDY:  MR BRAIN WO IV CONTRAST;  3/14/2024 10:04 pm      INDICATION:  Signs/Symptoms: altered mental status, cognitive decline.      COMPARISON:  Head CT and CTA, 03/01/2024 and brain MRI, 05/06/2021      ACCESSION NUMBER(S):  [id number]      ORDERING CLINICIAN:  PEACE GILMORE      TECHNIQUE:  Axial T2, FLAIR, DWI, gradient echo T2 and sagittal and coronal T1  weighted images of the brain were acquired.  Image quality is  degraded by motion.      FINDINGS:  CSF Spaces: The ventricles, sulci and basal cisterns are appropriate  for the degree of volume loss. T1 and T2 hyperintense subdural  collections measure up to 2 mm in greatest diameter bilaterally, not  previously visualized. No significant mass effect.      Parenchyma: There is no restricted diffusion to suggest acute  infarction.  Generalized parenchymal volume loss is similar to  previous. Patchy nonspecific T2 hyperintense signal in the white  matter, likely secondary to chronic small vessel ischemic disease.  There are prominent perivascular spaces versus chronic lacunar  infarcts in the basal ganglia and centrum semiovale bilaterally. No  other intracranial hemorrhage is identified. No midline shift or  herniation.      Paranasal Sinuses and Mastoids: Scattered paranasal sinus mucosal  thickening. Small mastoid effusions.      Impression: 1. Motion limited exam.  2. Small subacute subdural hematomas bilaterally, not previously  visualized, otherwise no acute intracranial pathology.      MACRO:  None      Signed by: Deni Johnson 3/15/2024 8:36 AM  Dictation workstation:   [id number]      Physical Exam  Constitutional: resting comfortably in bed, no acute distress   Eyes: making eye contact  HENT: [place]   Respiratory/Thorax: no acute respiratory distress  Gastrointestinal: Nondistended, soft, non-tender  Musculoskeletal: no significant peripheral edema appreciated   Neurological: Alert, oriented to self, hospital,  year.  Disoriented to event.  Remains confused regarding situation, following commands - moving all extremities on command   Psychological: Calm  Skin: Warm and dry    Relevant Results  Scheduled medications  atorvastatin, 80 mg, oral, Nightly  buprenorphine-naloxone, 1 tablet, sublingual, BID  carbidopa-levodopa, 1 tablet, oral, TID  docusate sodium, 100 mg, oral, BID  escitalopram, 20 mg, oral, Daily  fluticasone furoate-vilanteroL, 1 puff, inhalation, Daily  folic acid, 1 mg, oral, Daily  gabapentin, 300 mg, oral, TID  LORazepam, 0.5 mg, oral, q8h MASON  nicotine, 1 patch, transdermal, Daily  pantoprazole, 40 mg, intravenous, Daily  [Held by provider] potassium chloride, 20 mEq, oral, Daily  potassium chloride CR, 20 mEq, oral, Once  predniSONE, 10 mg, oral, Daily  thiamine, 100 mg, oral, Daily  tiotropium, 2 puff, inhalation, Daily  [Held by provider] torsemide, 10 mg, oral, Daily  [Held by provider] traZODone, 100 mg, oral, Nightly      ECG 12 lead    Result Date: 3/10/2024  Sinus rhythm with Premature atrial complexes Left axis deviation Septal infarct , age undetermined Abnormal ECG When compared with ECG of 02-MAY-2022 00:06, Premature atrial complexes are now Present Septal infarct is now Present See ED provider note for full interpretation and clinical correlation Confirmed by Karen Thibodeaux ([id number]) on 3/10/2024 12:39:54 PM    Lower extremity venous duplex bilateral    Result Date: 3/4/2024  Interpreted By:  Alexx Ellsworth, STUDY: [facility] LOWER EXTREMITY VENOUS DUPLEX BILATERAL  3/4/2024 1:27 pm   INDICATION: Elevated D-dimer.   COMPARISON: 03/02/2024.   ACCESSION NUMBER(S): [id number]   ORDERING CLINICIAN: ANTWON RIOS   TECHNIQUE: Routine ultrasound of the  bilateral lower extremity was performed with duplex Doppler (color and spectral) evaluation.  Static images were obtained for remote interpretation.   FINDINGS: RIGHT LEG: The  right common femoral, femoral, popliteal, proximal medial  saphenous, and deep femoral veins are patent and free of thrombus. The veins are normally compressible.  They demonstrate normal phasic flow and augmentation response.   Visualized segments of the right calf posterior tibial and peroneal vein segments appear patent and compressible without evidence of thrombus.   LEFT LEG: Study is positive for left lower extremity DVT. The left common femoral vein is partially compressible containing nonocclusive thrombus. The left femoral vein is noncompressible containing thrombus, portions of which appear occlusive or near occlusive in the mid-distal segments. The left popliteal vein is incompletely compressible containing nonocclusive thrombus.   Thrombus is also evident within the left calf vein posterior tibial vein and peroneal vein segments.       POSITIVE FOR LEFT LOWER EXTREMITY DVT. Thrombus demonstrated in the left common femoral vein, femoral vein, and popliteal vein as well as the posterior tibial and peroneal vein segments of the left calf. Some areas of thrombus appear occlusive or near occlusive in these regions.   No evidence of right lower extremity DVT.   MACRO: Alexx Ellsworth discussed the significance and urgency of this critical finding via secure chat with  ANTWON RIOS on 3/4/2024 at 4:21 pm.  (**-RCF-**) Findings:  See findings.   Signed by: Alexx Ellsworth 3/4/2024 4:22 PM Dictation workstation:   [id number]    Lower extremity venous duplex bilateral    Result Date: 3/2/2024  Interpreted By:  Blu Xiong, STUDY: [facility] LOWER EXTREMITY VENOUS DUPLEX BILATERAL  3/2/2024 7:48 pm   INDICATION: 69 y/o   M with  Signs/Symptoms:r/o DVT. LMP:  Unknown.   COMPARISON: None.   ACCESSION NUMBER(S): [id number]   ORDERING CLINICIAN: SUGEY HURTADO   TECHNIQUE: Routine ultrasound of the  right lower extremity was performed with duplex Doppler (color and spectral) evaluation.   Static images were obtained for remote interpretation.   FINDINGS: Examination  significantly limited due to patient condition.   Patency demonstrated from the left femoral vein proximally down to below the knee.         Limited examination due to patient condition. No DVT detected   MACRO: None   Signed by: Blu Xiong 3/2/2024 8:18 PM Dictation workstation:   [id number]    CT angio head and neck w and wo IV contrast    Result Date: 3/1/2024  Interpreted By:  Johnathon Higuera, STUDY: CT ANGIO HEAD AND NECK W AND WO IV CONTRAST;  3/1/2024 11:21 pm   INDICATION: Signs/Symptoms:R/O STROKE.   COMPARISON: Correlation made to noncontrast head CT of 03/01/2024.   ACCESSION NUMBER(S): [id number]   ORDERING CLINICIAN: SUGEY HURTADO   TECHNIQUE: Unenhanced CT images of the head were obtained. Subsequently, 75 cc Omnipaque 350 were administered intravenously and axial images of the head and neck were acquired.  Coronal, sagittal, and 3-D reconstructions were provided for review.   FINDINGS: Noncontrast head CT: Moderate to advanced volume loss.  There is periventricular and subcortical white matter hypoattenuation, most in keeping with chronic microvascular ischemic change. Gray-white matter differentiation is maintained. No evidence of acute intracranial hemorrhage. No mass, mass effect, midline shift, hydrocephalus, or abnormal extra-axial collection. Paranasal sinuses and mastoids are clear. Skull is within normal limits. Visible extracranial soft tissues including the orbits appear within normal limits.   CTA HEAD FINDINGS:   Anterior circulation: The bilateral intracranial internal carotid arteries, bilateral carotid terminals, bilateral proximal anterior and middle cerebral arteries are normal.   Posterior circulation: Bilateral intracranial vertebral arteries, vertebrobasilar junction, basilar artery and proximal posterior cerebral arteries are normal.   No intracranial saccular aneurysm or other abnormal intracranial enhancement is seen.   CTA NECK FINDINGS:   Right carotid vessels: Mild  partially calcified plaque in the common carotid artery without significant luminal narrowing. Mild-to-moderate plaque in the bifurcation without significant luminal narrowing. Moderate to severe plaque in the carotid bulb causing narrowing of between 20 and 30% by NASCET criteria. The more distal cervical ICA is of normal caliber without hemodynamically significant luminal narrowing.   Left carotid vessels: Mild partially calcified plaque in the common carotid artery without significant luminal narrowing. Mild-to-moderate plaque in the bifurcation without significant luminal narrowing. Moderate to severe plaque in the carotid bulb causing narrowing of between 20 and 30% by NASCET criteria. The more distal cervical ICA is of normal caliber without hemodynamically significant luminal narrowing.   Vertebral vessels: Mild calcific plaque at the vertebral artery origins without significant narrowing. The visualized segments of the cervical vertebral arteries are normal in caliber. Right vertebral artery is dominant.   ACDF changes noted. Severe emphysema. Biapical pleuroparenchymal scarring.       No evidence of acute intracranial hemorrhage or cortical infarct on noncontrast CT.   No evidence for hemodynamically significant stenosis of the cervical vessels. Mild atherosclerotic narrowing in the bilateral carotid bulbs measuring between 20 and 30% by NASCET criteria.   No evidence for significant stenosis or large branch vessel cutoffs of the intracranial vessels.   Incidentally noted severe emphysema in the visible lungs.   MACRO: None   Signed by: Johnathon Higuera 3/1/2024 11:46 PM Dictation workstation:   [id number]    CT thoracic spine wo IV contrast    Result Date: 3/1/2024  STUDY: CT Chest, Abdomen, and Pelvis with IV Contrast, CT Reconstruction Thoracic Spine and Lumbar Spine; 3/1/2024 at 5:58 p.m. INDICATION: Possible fall.  Altered mental status. COMPARISON: Two-view CXR 1/23/2024.  CT chest 8/1/2022.  US  gallbladder 8/27/2020. ACCESSION NUMBER(S): [id number], [id number], [id number] ORDERING CLINICIAN: HUNTER ABBASI TECHNIQUE: CT of the chest, abdomen, and pelvis was performed.  Contiguous axial images were obtained at 3 mm slice thickness through the chest, abdomen, and pelvis.  Coronal and sagittal reconstructions at 3 mm slice thickness were performed.  Omnipaque 350 75 mL was administered intravenously.  Please note that spinal images were generated from the original CT abdomen and pelvis imaging. FINDINGS: CHEST: MEDIASTINUM: The heart is normal in size without pericardial effusion.  Central vascular structures opacify normally.  LUNGS/PLEURA: There is no pleural effusion, pleural thickening, or pneumothorax. The airways are patent. There is a 6 mm nodule in the right upper lobe. This was present on the previous examination and is unchanged. Lungs are clear without consolidation, interstitial disease, or suspicious nodules. LYMPH NODES: Thoracic lymph nodes are not enlarged. ABDOMEN:  LIVER: No hepatomegaly.  Smooth surface contour.  Normal attenuation.  BILE DUCTS: No intrahepatic or extrahepatic biliary ductal dilatation.  GALLBLADDER: The gallbladder is unremarkable. STOMACH: No abnormalities identified.  PANCREAS: No masses or ductal dilatation.  SPLEEN: No splenomegaly or focal splenic lesion.  ADRENAL GLANDS: No thickening or nodules.  KIDNEYS AND URETERS: Kidneys are normal in size and location.  No renal or ureteral calculi.  PELVIS:  BLADDER: No abnormalities identified.  REPRODUCTIVE ORGANS: No abnormalities identified.  BOWEL: No abnormalities identified.  VESSELS: No abnormalities identified.  Abdominal aorta is normal in caliber.  PERITONEUM/RETROPERITONEUM/LYMPH NODES: No free fluid.  No pneumoperitoneum. No lymphadenopathy.  ABDOMINAL WALL: No abnormalities identified. SOFT TISSUES: No abnormalities identified.  BONES: No acute fracture or aggressive osseous lesion. THORACIC SPINE: The  alignment is anatomic.  There is no fracture or traumatic subluxation. The vertebral body heights are well maintained.  Disc spaces are preserved.  No significant central canal stenosis is demonstrated. The neural foramina are patent throughout. There is mild degenerative change. The paravertebral soft tissues are within normal limits. LUMBAR SPINE: There is mild degenerative scoliosis as well as mild anterolisthesis at L4-5. The spine is in otherwise good alignment.  There is no fracture or traumatic subluxation. The vertebral body heights are well maintained.  There is mild disc space during throughout the lumbar spine.  No significant central canal stenosis is demonstrated.  The right L4-5 neural foramen is slightly narrowed.  The paravertebral soft tissues are within normal limits.    1. No evidence for acute injury to the chest, abdomen, pelvis, thoracic, or lumbar spine. 2. There is degenerative change in the lumbar spine as described. 3. There is a stable 6 mm nodule in the right upper lobe. Signed by Robert Oquendo MD    CT lumbar spine wo IV contrast    Result Date: 3/1/2024  STUDY: CT Chest, Abdomen, and Pelvis with IV Contrast, CT Reconstruction Thoracic Spine and Lumbar Spine; 3/1/2024 at 5:58 p.m. INDICATION: Possible fall.  Altered mental status. COMPARISON: Two-view CXR 1/23/2024.  CT chest 8/1/2022.  US gallbladder 8/27/2020. ACCESSION NUMBER(S): [id number], [id number], [id number] ORDERING CLINICIAN: HUNTER ABBASI TECHNIQUE: CT of the chest, abdomen, and pelvis was performed.  Contiguous axial images were obtained at 3 mm slice thickness through the chest, abdomen, and pelvis.  Coronal and sagittal reconstructions at 3 mm slice thickness were performed.  Omnipaque 350 75 mL was administered intravenously.  Please note that spinal images were generated from the original CT abdomen and pelvis imaging. FINDINGS: CHEST: MEDIASTINUM: The heart is normal in size without pericardial effusion.   Central vascular structures opacify normally.  LUNGS/PLEURA: There is no pleural effusion, pleural thickening, or pneumothorax. The airways are patent. There is a 6 mm nodule in the right upper lobe. This was present on the previous examination and is unchanged. Lungs are clear without consolidation, interstitial disease, or suspicious nodules. LYMPH NODES: Thoracic lymph nodes are not enlarged. ABDOMEN:  LIVER: No hepatomegaly.  Smooth surface contour.  Normal attenuation.  BILE DUCTS: No intrahepatic or extrahepatic biliary ductal dilatation.  GALLBLADDER: The gallbladder is unremarkable. STOMACH: No abnormalities identified.  PANCREAS: No masses or ductal dilatation.  SPLEEN: No splenomegaly or focal splenic lesion.  ADRENAL GLANDS: No thickening or nodules.  KIDNEYS AND URETERS: Kidneys are normal in size and location.  No renal or ureteral calculi.  PELVIS:  BLADDER: No abnormalities identified.  REPRODUCTIVE ORGANS: No abnormalities identified.  BOWEL: No abnormalities identified.  VESSELS: No abnormalities identified.  Abdominal aorta is normal in caliber.  PERITONEUM/RETROPERITONEUM/LYMPH NODES: No free fluid.  No pneumoperitoneum. No lymphadenopathy.  ABDOMINAL WALL: No abnormalities identified. SOFT TISSUES: No abnormalities identified.  BONES: No acute fracture or aggressive osseous lesion. THORACIC SPINE: The alignment is anatomic.  There is no fracture or traumatic subluxation. The vertebral body heights are well maintained.  Disc spaces are preserved.  No significant central canal stenosis is demonstrated. The neural foramina are patent throughout. There is mild degenerative change. The paravertebral soft tissues are within normal limits. LUMBAR SPINE: There is mild degenerative scoliosis as well as mild anterolisthesis at L4-5. The spine is in otherwise good alignment.  There is no fracture or traumatic subluxation. The vertebral body heights are well maintained.  There is mild disc space during  throughout the lumbar spine.  No significant central canal stenosis is demonstrated.  The right L4-5 neural foramen is slightly narrowed.  The paravertebral soft tissues are within normal limits.    1. No evidence for acute injury to the chest, abdomen, pelvis, thoracic, or lumbar spine. 2. There is degenerative change in the lumbar spine as described. 3. There is a stable 6 mm nodule in the right upper lobe. Signed by Robert Oquendo MD    CT chest abdomen pelvis w IV contrast    Result Date: 3/1/2024  STUDY: CT Chest, Abdomen, and Pelvis with IV Contrast, CT Reconstruction Thoracic Spine and Lumbar Spine; 3/1/2024 at 5:58 p.m. INDICATION: Possible fall.  Altered mental status. COMPARISON: Two-view CXR 1/23/2024.  CT chest 8/1/2022.  US gallbladder 8/27/2020. ACCESSION NUMBER(S): [id number], [id number], [id number] ORDERING CLINICIAN: HUNTER ABBASI TECHNIQUE: CT of the chest, abdomen, and pelvis was performed.  Contiguous axial images were obtained at 3 mm slice thickness through the chest, abdomen, and pelvis.  Coronal and sagittal reconstructions at 3 mm slice thickness were performed.  Omnipaque 350 75 mL was administered intravenously.  Please note that spinal images were generated from the original CT abdomen and pelvis imaging. FINDINGS: CHEST: MEDIASTINUM: The heart is normal in size without pericardial effusion.  Central vascular structures opacify normally.  LUNGS/PLEURA: There is no pleural effusion, pleural thickening, or pneumothorax. The airways are patent. There is a 6 mm nodule in the right upper lobe. This was present on the previous examination and is unchanged. Lungs are clear without consolidation, interstitial disease, or suspicious nodules. LYMPH NODES: Thoracic lymph nodes are not enlarged. ABDOMEN:  LIVER: No hepatomegaly.  Smooth surface contour.  Normal attenuation.  BILE DUCTS: No intrahepatic or extrahepatic biliary ductal dilatation.  GALLBLADDER: The gallbladder is unremarkable.  STOMACH: No abnormalities identified.  PANCREAS: No masses or ductal dilatation.  SPLEEN: No splenomegaly or focal splenic lesion.  ADRENAL GLANDS: No thickening or nodules.  KIDNEYS AND URETERS: Kidneys are normal in size and location.  No renal or ureteral calculi.  PELVIS:  BLADDER: No abnormalities identified.  REPRODUCTIVE ORGANS: No abnormalities identified.  BOWEL: No abnormalities identified.  VESSELS: No abnormalities identified.  Abdominal aorta is normal in caliber.  PERITONEUM/RETROPERITONEUM/LYMPH NODES: No free fluid.  No pneumoperitoneum. No lymphadenopathy.  ABDOMINAL WALL: No abnormalities identified. SOFT TISSUES: No abnormalities identified.  BONES: No acute fracture or aggressive osseous lesion. THORACIC SPINE: The alignment is anatomic.  There is no fracture or traumatic subluxation. The vertebral body heights are well maintained.  Disc spaces are preserved.  No significant central canal stenosis is demonstrated. The neural foramina are patent throughout. There is mild degenerative change. The paravertebral soft tissues are within normal limits. LUMBAR SPINE: There is mild degenerative scoliosis as well as mild anterolisthesis at L4-5. The spine is in otherwise good alignment.  There is no fracture or traumatic subluxation. The vertebral body heights are well maintained.  There is mild disc space during throughout the lumbar spine.  No significant central canal stenosis is demonstrated.  The right L4-5 neural foramen is slightly narrowed.  The paravertebral soft tissues are within normal limits.    1. No evidence for acute injury to the chest, abdomen, pelvis, thoracic, or lumbar spine. 2. There is degenerative change in the lumbar spine as described. 3. There is a stable 6 mm nodule in the right upper lobe. Signed by Robert Oquendo MD    XR chest 1 view    Result Date: 3/1/2024  STUDY: Chest Radiograph;  3/1/24 at 5:47 PM INDICATION: Altered mental status. COMPARISON: Chest XR 1/23/24.  ACCESSION NUMBER(S): [id number] ORDERING CLINICIAN: HUNTER ABBASI TECHNIQUE:  Frontal chest was obtained at 1746 hours. (two images) FINDINGS: CARDIOMEDIASTINAL SILHOUETTE: Cardiomediastinal silhouette is normal in size and configuration.  LUNGS: Lungs are clear.  ABDOMEN: No remarkable upper abdominal findings.  BONES: No acute osseous changes.    No acute pulmonary pathology. Signed by Robert Oquendo MD    XR pelvis 1-2 views    Result Date: 3/1/2024  STUDY: Pelvis Radiographs; 3/1/24 at 5:47 PM INDICATION: Left hip pain. COMPARISON: Left hip XR 4/30/16. ACCESSION NUMBER(S): [id number] ORDERING CLINICIAN: HUNTER ABBASI TECHNIQUE:  Two view(s) of the pelvis. FINDINGS:  The pelvic ring is intact.  There is no acute fracture. There are degenerative changes lower lumbosacral spine.  There is an inferior vena cava filter present.    No acute osseous abnormalities. Signed by Be Hare MD    CT head wo IV contrast    Result Date: 3/1/2024  Interpreted By:  Johnathon Higuera, STUDY: CT HEAD WO IV CONTRAST; CT CERVICAL SPINE WO IV CONTRAST; ;  3/1/2024 5:38 pm   INDICATION: Signs/Symptoms:ams possible fall.   COMPARISON: Noncontrast CT head of 05/05/2021. CT cervical spine of 06/04/2010.   ACCESSION NUMBER(S): [id number]; [id number]   ORDERING CLINICIAN: HUNTER ABBASI   TECHNIQUE: Noncontrast CT exams of the head and cervical spine with multiplanar reformations.   FINDINGS: BRAIN PARENCHYMA: Moderate to advanced volume loss.  There is periventricular and subcortical white matter hypoattenuation, most in keeping with chronic microvascular ischemic change. Cystic spaces in the bilateral corpus striatum probably representing chronic lacunar infarcts. Gray-white matter interfaces are preserved. No mass, mass effect or midline shift.   HEMORRHAGE: No acute intracranial hemorrhage. VENTRICLES and EXTRA-AXIAL SPACES: Normal size. EXTRACRANIAL SOFT TISSUES: Within normal limits. PARANASAL SINUSES/MASTOIDS: The visualized  paranasal sinuses and mastoid air cells are aerated. CALVARIUM: No depressed skull fracture. No destructive osseous lesion.   OTHER FINDINGS: None.   CERVICAL SPINE:   Mild reversal of the normal cervical lordosis could reflect positioning or muscle spasm. ALIGNMENT: New trace degenerative anterolisthesis at C2-C3. Alignment is otherwise normal. VERTEBRAE: Acute fracture. Vertebral stature is maintained. Redemonstrated ACDF changes at C5-C7. Severe discogenic degeneration with endplate irregularity and osteophytosis and uncovertebral hypertrophy at C3-C4 and C4-C5. Moderate hypertrophic facet osteoarthropathy excepting on the right at C 2 C3 where it is severe in degree. SPINAL CANAL: No critical spinal canal stenosis. Moderate to severe degenerative canal narrowing at C4-C5 secondary to disc osteophyte bulging, similar to before. PREVERTEBRAL SOFT TISSUES: No prevertebral soft tissue swelling. LUNG APICES: Moderate to severe emphysema   OTHER FINDINGS: None.       No evidence of acute intracranial abnormality.   No acute cervical spine fracture or malalignment.     MACRO: None   Signed by: Johnathon Higuera 3/1/2024 6:09 PM Dictation workstation:   [id number]    CT cervical spine wo IV contrast    Result Date: 3/1/2024  Interpreted By:  Johnathon Higuera, STUDY: CT HEAD WO IV CONTRAST; CT CERVICAL SPINE WO IV CONTRAST; ;  3/1/2024 5:38 pm   INDICATION: Signs/Symptoms:ams possible fall.   COMPARISON: Noncontrast CT head of 05/05/2021. CT cervical spine of 06/04/2010.   ACCESSION NUMBER(S): [id number]; [id number]   ORDERING CLINICIAN: HUNTER ABBASI   TECHNIQUE: Noncontrast CT exams of the head and cervical spine with multiplanar reformations.   FINDINGS: BRAIN PARENCHYMA: Moderate to advanced volume loss.  There is periventricular and subcortical white matter hypoattenuation, most in keeping with chronic microvascular ischemic change. Cystic spaces in the bilateral corpus striatum probably representing chronic lacunar  infarcts. Gray-white matter interfaces are preserved. No mass, mass effect or midline shift.   HEMORRHAGE: No acute intracranial hemorrhage. VENTRICLES and EXTRA-AXIAL SPACES: Normal size. EXTRACRANIAL SOFT TISSUES: Within normal limits. PARANASAL SINUSES/MASTOIDS: The visualized paranasal sinuses and mastoid air cells are aerated. CALVARIUM: No depressed skull fracture. No destructive osseous lesion.   OTHER FINDINGS: None.   CERVICAL SPINE:   Mild reversal of the normal cervical lordosis could reflect positioning or muscle spasm. ALIGNMENT: New trace degenerative anterolisthesis at C2-C3. Alignment is otherwise normal. VERTEBRAE: Acute fracture. Vertebral stature is maintained. Redemonstrated ACDF changes at C5-C7. Severe discogenic degeneration with endplate irregularity and osteophytosis and uncovertebral hypertrophy at C3-C4 and C4-C5. Moderate hypertrophic facet osteoarthropathy excepting on the right at C 2 C3 where it is severe in degree. SPINAL CANAL: No critical spinal canal stenosis. Moderate to severe degenerative canal narrowing at C4-C5 secondary to disc osteophyte bulging, similar to before. PREVERTEBRAL SOFT TISSUES: No prevertebral soft tissue swelling. LUNG APICES: Moderate to severe emphysema   OTHER FINDINGS: None.       No evidence of acute intracranial abnormality.   No acute cervical spine fracture or malalignment.     MACRO: None   Signed by: Johnathon Higuera 3/1/2024 6:09 PM Dictation workstation:   [id number]     Continuous medications  heparin, 0-4,500 Units/hr, Last Rate: 1,400 Units/hr (03/15/24 1412)    PRN medications  PRN medications: acetaminophen, albuterol, heparin, nicotine polacrilex, oxygen, oxygen, QUEtiapine       Assessment/Plan      Chris Wu is a 68 y.o. male with pertinent hx of COPD, Parkinson's disorder, history of factor V Leiden mutation with DVT and PE in the past not on anticoagulation currently s/p IVC filter, depression/anxiety & PTSD & diastolic CHF who was  admitted to the hospital secondary to acute encephalopathy.     Subacute bilateral SDH   Noted on MRI brain 3/14/2024.  Discussed with neurosurgery at [facility], Dr. Trevino, who reviewed the films and felt that the bleeding was chronic.  We discussed patient's extensive DVT, he recommended trial of heparin with repeat CT head.  If repeat CT head in 24 hours remained stable, transition to Eliquis is reasonable.    As long as imaging remained stable, patient will not require neurosurgery follow-up at discharge    Altered mental status/encephalopathy, 2/2 to  polypharmacy  Parkinson disease  Depression ADD/PTSD  ANDREA  Multiple imaging modalities pursued without etiology for altered mental status discovered.  Suspect altered mentation likely secondary to cognitive impairment in the setting of polypharmacy.    Multiple medication adjustments have been made since arrival with improvement in mentation although still remains confused and tangential.  Mentation significantly waxes and wanes throughout the day.    Home medications that may be contributing to altered mentation:  Suboxone daily  BuSpar 20 mg 3 times daily  Sinemet  3 times daily  Lexapro 20 mg daily  Gabapentin 600 mg 3 times daily  Atarax 25 mg twice daily  Ativan 1 mg twice daily as needed for anxiety  Seroquel 50 mg 4 times daily as needed for anxiety  Trazodone 100 mg once daily at bedtime     Current medications:   Suboxone twice daily, dose unchanged  Sinemet twice daily, dose unchanged  Lexapro 20 mg daily, dose unchanged  Gabapentin 300 mg 3 times daily, dose decreased  Atarax discontinued  Lorazepam 0.5 mg every 8 hours, dose decreased (tapering)  Seroquel changed to 25 mg 4 times daily as needed for agitation, dose decreased  Trazodone discontinued    Per OARRS report, oxazepam and lorazepam started at the end of January.  He picked up prescriptions every week with pill count ranging from 28 to 56 pills.  He has been reliably picking up his Suboxone  and gabapentin for at least the past couple of years.    Occupational Therapy consulted for MoCA assessment.  May need to consider application for legal guardianship.  Attempted to call patient's primary care physician (KENYATTA Schrader) to get a better understanding of baseline and see if she would be willing to perform expert evaluation as she has a standing relationship with the patient. I spoke to the medical assistant on 3/14/2024 however did not receive a call back from the PCP.  Attempted to call patient's  Will [phone] however [facility], according to discussion with social work he is currently on vacation.    Extensive left lower extremity DVT  3/4/24 POSITIVE FOR LEFT LOWER EXTREMITY DVT. Thrombus demonstrated in the left common femoral vein, femoral vein, and popliteal vein as well as the posterior tibial and peroneal vein segments of the left calf. Some areas of thrombus appear occlusive or near occlusive in these regions.      Eliquis stopped today due to MRI brain findings.  Continue with heparin drip with plan to transition back to Eliquis if CT head on 3/16/2024 is stable.    Lung nodule, incidental. 6 mm right upper lobe nodule  -Follow-up with pulmonology and primary care provider as an outpatient for further evaluation management.      Chronic hypoxemic respiratory failureon 4L NC  COPD without evidence of exacerbation  Patient has been prescribed prednisone 20 mg daily since May 2023.  Appears secondary to end stage COPD, he is tolerating reduced dose here.  Continue pred 10 mg daily, consider further taper in the future.    Hypokalemia, resolved      Inability to care for self  Lack mental capacity  PT OT and social work consulted - recommending mod intensity. Requested process to be started.  Patient is intermittently refusing this.       GERD  Continue PPI    DVT prophylaxis heparin drip       Disposition: TBD, pending safe discharge  plan    Discussed with patient and RN  Discussed with neurosurgery at [facility]  Discussed with SW and TCC    Time spent coordination of care 55 minutes         Chetna Archer DO

## 2024-03-16 ENCOUNTER — APPOINTMENT (OUTPATIENT)
Dept: RADIOLOGY | Facility: HOSPITAL | Age: 69
DRG: 091 | End: 2024-03-16
Payer: MEDICARE

## 2024-03-16 LAB
UFH PPP CHRO-ACNC: 0.5 IU/ML
UFH PPP CHRO-ACNC: 0.7 IU/ML

## 2024-03-16 PROCEDURE — 99233 SBSQ HOSP IP/OBS HIGH 50: CPT | Performed by: FAMILY MEDICINE

## 2024-03-16 PROCEDURE — C9113 INJ PANTOPRAZOLE SODIUM, VIA: HCPCS | Performed by: NURSE PRACTITIONER

## 2024-03-16 PROCEDURE — 36415 COLL VENOUS BLD VENIPUNCTURE: CPT | Performed by: STUDENT IN AN ORGANIZED HEALTH CARE EDUCATION/TRAINING PROGRAM

## 2024-03-16 PROCEDURE — 70450 CT HEAD/BRAIN W/O DYE: CPT | Performed by: RADIOLOGY

## 2024-03-16 PROCEDURE — 85520 HEPARIN ASSAY: CPT | Performed by: STUDENT IN AN ORGANIZED HEALTH CARE EDUCATION/TRAINING PROGRAM

## 2024-03-16 PROCEDURE — S4991 NICOTINE PATCH NONLEGEND: HCPCS | Performed by: FAMILY MEDICINE

## 2024-03-16 PROCEDURE — 2500000001 HC RX 250 WO HCPCS SELF ADMINISTERED DRUGS (ALT 637 FOR MEDICARE OP): Performed by: NURSE PRACTITIONER

## 2024-03-16 PROCEDURE — 70450 CT HEAD/BRAIN W/O DYE: CPT

## 2024-03-16 PROCEDURE — 2500000001 HC RX 250 WO HCPCS SELF ADMINISTERED DRUGS (ALT 637 FOR MEDICARE OP): Performed by: FAMILY MEDICINE

## 2024-03-16 PROCEDURE — 2500000004 HC RX 250 GENERAL PHARMACY W/ HCPCS (ALT 636 FOR OP/ED): Performed by: INTERNAL MEDICINE

## 2024-03-16 PROCEDURE — 2500000002 HC RX 250 W HCPCS SELF ADMINISTERED DRUGS (ALT 637 FOR MEDICARE OP, ALT 636 FOR OP/ED): Performed by: FAMILY MEDICINE

## 2024-03-16 PROCEDURE — 2500000002 HC RX 250 W HCPCS SELF ADMINISTERED DRUGS (ALT 637 FOR MEDICARE OP, ALT 636 FOR OP/ED): Performed by: INTERNAL MEDICINE

## 2024-03-16 PROCEDURE — 1200000002 HC GENERAL ROOM WITH TELEMETRY DAILY

## 2024-03-16 PROCEDURE — 2500000004 HC RX 250 GENERAL PHARMACY W/ HCPCS (ALT 636 FOR OP/ED): Performed by: NURSE PRACTITIONER

## 2024-03-16 PROCEDURE — 2500000001 HC RX 250 WO HCPCS SELF ADMINISTERED DRUGS (ALT 637 FOR MEDICARE OP)

## 2024-03-16 RX ADMIN — CARBIDOPA AND LEVODOPA 1 TABLET: 25; 100 TABLET ORAL at 10:51

## 2024-03-16 RX ADMIN — GABAPENTIN 300 MG: 300 CAPSULE ORAL at 21:16

## 2024-03-16 RX ADMIN — LORAZEPAM 0.5 MG: 0.5 TABLET ORAL at 15:24

## 2024-03-16 RX ADMIN — DOCUSATE SODIUM 100 MG: 100 CAPSULE, LIQUID FILLED ORAL at 21:16

## 2024-03-16 RX ADMIN — GABAPENTIN 300 MG: 300 CAPSULE ORAL at 10:51

## 2024-03-16 RX ADMIN — LORAZEPAM 0.5 MG: 0.5 TABLET ORAL at 21:16

## 2024-03-16 RX ADMIN — BUPRENORPHINE AND NALOXONE 1 TABLET: 8; 2 TABLET SUBLINGUAL at 21:20

## 2024-03-16 RX ADMIN — CARBIDOPA AND LEVODOPA 1 TABLET: 25; 100 TABLET ORAL at 21:16

## 2024-03-16 RX ADMIN — LORAZEPAM 0.5 MG: 0.5 TABLET ORAL at 05:52

## 2024-03-16 RX ADMIN — ATORVASTATIN CALCIUM 80 MG: 80 TABLET, FILM COATED ORAL at 21:16

## 2024-03-16 RX ADMIN — FOLIC ACID 1 MG: 1 TABLET ORAL at 10:51

## 2024-03-16 RX ADMIN — TIOTROPIUM BROMIDE INHALATION SPRAY 2 PUFF: 3.12 SPRAY, METERED RESPIRATORY (INHALATION) at 10:52

## 2024-03-16 RX ADMIN — NICOTINE 1 PATCH: 21 PATCH, EXTENDED RELEASE TRANSDERMAL at 10:53

## 2024-03-16 RX ADMIN — THIAMINE HCL TAB 100 MG 100 MG: 100 TAB at 10:51

## 2024-03-16 RX ADMIN — ESCITALOPRAM OXALATE 20 MG: 20 TABLET ORAL at 10:51

## 2024-03-16 RX ADMIN — PANTOPRAZOLE SODIUM 40 MG: 40 INJECTION, POWDER, FOR SOLUTION INTRAVENOUS at 10:52

## 2024-03-16 RX ADMIN — GABAPENTIN 300 MG: 300 CAPSULE ORAL at 15:24

## 2024-03-16 RX ADMIN — FLUTICASONE FUROATE AND VILANTEROL TRIFENATATE 1 PUFF: 200; 25 POWDER RESPIRATORY (INHALATION) at 10:52

## 2024-03-16 RX ADMIN — POTASSIUM CHLORIDE 20 MEQ: 1.5 FOR SOLUTION ORAL at 10:52

## 2024-03-16 RX ADMIN — CARBIDOPA AND LEVODOPA 1 TABLET: 25; 100 TABLET ORAL at 15:24

## 2024-03-16 RX ADMIN — BUPRENORPHINE AND NALOXONE 1 TABLET: 8; 2 TABLET SUBLINGUAL at 11:01

## 2024-03-16 RX ADMIN — APIXABAN 5 MG: 5 TABLET, FILM COATED ORAL at 11:01

## 2024-03-16 RX ADMIN — PREDNISONE 10 MG: 10 TABLET ORAL at 10:51

## 2024-03-16 RX ADMIN — APIXABAN 5 MG: 5 TABLET, FILM COATED ORAL at 21:16

## 2024-03-16 ASSESSMENT — COGNITIVE AND FUNCTIONAL STATUS - GENERAL
WALKING IN HOSPITAL ROOM: A LOT
DRESSING REGULAR UPPER BODY CLOTHING: A LITTLE
HELP NEEDED FOR BATHING: A LITTLE
TOILETING: A LOT
DRESSING REGULAR UPPER BODY CLOTHING: A LITTLE
EATING MEALS: A LITTLE
MOBILITY SCORE: 17
MOVING TO AND FROM BED TO CHAIR: A LITTLE
TOILETING: A LOT
STANDING UP FROM CHAIR USING ARMS: A LITTLE
CLIMB 3 TO 5 STEPS WITH RAILING: TOTAL
PERSONAL GROOMING: A LITTLE
DAILY ACTIVITIY SCORE: 17
EATING MEALS: A LITTLE
DAILY ACTIVITIY SCORE: 17
PERSONAL GROOMING: A LITTLE
DRESSING REGULAR LOWER BODY CLOTHING: A LITTLE
WALKING IN HOSPITAL ROOM: A LOT
CLIMB 3 TO 5 STEPS WITH RAILING: TOTAL
HELP NEEDED FOR BATHING: A LITTLE
DRESSING REGULAR LOWER BODY CLOTHING: A LITTLE
MOBILITY SCORE: 17
MOVING TO AND FROM BED TO CHAIR: A LITTLE
STANDING UP FROM CHAIR USING ARMS: A LITTLE

## 2024-03-16 ASSESSMENT — PAIN SCALES - PAIN ASSESSMENT IN ADVANCED DEMENTIA (PAINAD)
CONSOLABILITY: NO NEED TO CONSOLE
BREATHING: NORMAL
FACIALEXPRESSION: SMILING OR INEXPRESSIVE
BODYLANGUAGE: RELAXED
TOTALSCORE: 0

## 2024-03-16 ASSESSMENT — PAIN SCALES - GENERAL
PAINLEVEL_OUTOF10: 0 - NO PAIN
PAINLEVEL_OUTOF10: 0 - NO PAIN

## 2024-03-16 ASSESSMENT — PAIN - FUNCTIONAL ASSESSMENT: PAIN_FUNCTIONAL_ASSESSMENT: 0-10

## 2024-03-16 NOTE — CARE PLAN
Problem: Respiratory  Goal: Minimize anxiety/maximize coping throughout shift  Outcome: Progressing  Goal: No signs of respiratory distress (eg. Use of accessory muscles. Peds grunting)  Outcome: Progressing     Problem: Safety  Goal: Patient will be injury free during hospitalization  Outcome: Progressing  Goal: I will remain free of falls  Outcome: Progressing   The patient's goals for the shift include      The clinical goals for the shift include pt will be free of falls this shift    Over the shift, the patient did make progress toward the following goals. Recommendations to address these barriers include call light in reach, bed alarm activated, frequently reorienting the pt during the shift.

## 2024-03-16 NOTE — PROGRESS NOTES
03/16/24 1439   Discharge Planning   Patient expects to be discharged to: [facility] will do on-site visist with pt on Mon 3/18.  [facility] still considering the referral.  [facility] has no bed until the end of next week.  Pt will need guardianship as any facility will need to work with a guardian to help get pt on Medicaid.

## 2024-03-16 NOTE — PROGRESS NOTES
"Capacity Assessment Tool    \"Capacity\" is the \"ability\" to make a decision.  The decision in question must be specific (one decision), relevant to a patient's current condition (appropriate), and timely (neither prospective nor retrospective).    Capacity varies based on knowledge base (explanation/understanding of clinical information), cognitive processing, acute psychiatric illness, and other clinical conditions.    In order to be deemed \"capacitated\" to make a single decision at one point in time, a patient must demonstrate all 4 of the following elements:    *Ability to consistently communicate a choice (consistent over time with adequate information)  *Ability to understand the relevant information (accurate knowledge of condition)  *Ability to appreciate the situation and its consequences (risks/benefits, pros/cons)  *Ability to reason about treatment options (without undue influence of a person or condition, eg. suicidality or acute psychosis)      Current Decision    Clinical issue:   Patient was treated for acute toxic encephalopathy and found to have a chronic bilateral subdural hemorrhage of which she is improving.  Has persistent cognitive defects with suspected dementia seem to leave him with the inability to make decisions for himself.    Did the appropriate team address relevant information with the patient:  Yes    Date: 03/16/25    If \"NO\" is selected for appropriate team, then please discuss with the appropriate team.  The appropriate team should be encouraged to address relevant information with the patient AND reevaluate capacity when appropriate.    Capacity Evaluation    Patient demonstrates ability to consistently communicate choice:  Yes he has been consistent with his desire to return home to his cat.    Patient demonstrates ability to understand the relevant information:  No, when his treatment plan is discussed the patient has tangential thoughts and will start irrelevant " "conversations.    Patient demonstrates ability to appreciate the situation and its consequences:  No  After discussing the concerns of his treatment team in lay terms with regards to him returning home he is not able to repeat our concerns and does not seem to appreciate his situation or its consequences.    Patient demonstrates ability to reason about treatment options:  No, he repeats the desire to return home but offers no further insight to treatment options    If ANY of the above items are answered \"NO,\" the patient LACKS CAPACITY for that specific decision at hand, at that specific time.  Further capacity evaluations can be done as needed.           "

## 2024-03-16 NOTE — CARE PLAN
The patient's goals for the shift include      The clinical goals for the shift include pt will be free of falls this shift

## 2024-03-16 NOTE — PROGRESS NOTES
Chris Wu is a 68 y.o. male on day 15 of admission presenting with Encephalopathy.      Subjective   Patient is pleasant with no acute complaints.  Interested in returning home.       Objective     Last Recorded Vitals  /67   Pulse 75   Temp 36 °C (96.8 °F)   Resp 16   Wt 76 kg (167 lb 8.8 oz)   SpO2 93%   Intake/Output last 3 Shifts:  No intake or output data in the 24 hours ending 03/16/24 0955    Admission Weight  Weight: 72.6 kg (160 lb) (03/01/24 1533)    Daily Weight  03/11/24 : 76 kg (167 lb 8.8 oz)    Image Results  CT head wo IV contrast  Narrative: Interpreted By:  Jessica Claudio,  Shiva Higuera   STUDY:  CT HEAD WO IV CONTRAST;  3/16/2024 1:21 am      INDICATION:  Signs/Symptoms:reassess SDH, on anticoagulation.      COMPARISON:  CT of the head 03/15/2024. MRI of the brain 03/14/2024.      ACCESSION NUMBER(S):  [id number]      ORDERING CLINICIAN:  PEACE GILMORE      TECHNIQUE:  Noncontrast axial CT of the head was performed. Angled reformats in  brain and bone windows were generated. The images were reviewed in  bone, brain, blood and soft tissue windows. Coronal and sagittal  reformats were provided for review.      FINDINGS:  CSF Spaces: The subdural collections demonstrated on prior MRI from  March 14, 2024 are not well seen on the basis of this examination.  There is prominence of ventricles and sulci compatible with diffuse  parenchymal volume loss. There is again prominent retrocerebellar CSF  space which may represent a natalee cisterna magna or retrocerebellar  arachnoid cyst.      Parenchyma: Grey-white differentiation is intact. Moderate white  matter hypoattenuation is compatible with small-vessel ischemic  change. Hypodensities within the bilateral basal ganglia and may  represent prominent perivascular spaces versus lacunar infarcts. No  mass effect or midline shift. No new intracranial hemorrhage is  identified.      Calvarium: Unremarkable.      Paranasal sinuses  and mastoids: Visualized paranasal sinuses and  mastoid air cells are well-aerated.      Impression: The small subdural collections demonstrated on recent MRI are not  well visualized on the basis of CT. No new, acute intracranial  hemorrhage is identified.      Extensive white matter changes are nonspecific but most likely  represent small-vessel ischemic disease in a patient of this age.  Scattered lacunar infarcts are suspected as well.      Diffuse parenchymal volume loss.      I personally reviewed the images/study and I agree with the findings  as stated by Dr. Mcfadden.      MACRO:  None      Signed by: Jessica Claudio 3/16/2024 8:54 AM  Dictation workstation:   [id number]      Physical Exam  Constitutional: resting comfortably in bed, no acute distress   Eyes: making eye contact  Respiratory/Thorax: CTA bilaterally, no acute respiratory distress  Cardiovascular: regular rate and rhythm  Gastrointestinal: Nondistended, soft, non-tender  Musculoskeletal: no significant peripheral edema appreciated   Neurological: Alert, oriented to self, hospital, year.  Disoriented to event.  Remains confused regarding situation  Psychological: Calm, thought content more clear, remains mildly tangential   Skin: Warm and dry      Assessment/Plan      Chris Wu is a 68 y.o. male with pertinent hx of COPD, Parkinson's disorder, history of factor V Leiden mutation with DVT and PE in the past not on anticoagulation currently s/p IVC filter, depression/anxiety & PTSD & diastolic CHF who was admitted to the hospital secondary to acute encephalopathy.     Acute bilateral subdural hemorrhage  Neurosurgery at [facility], Dr. Trevino, reviewed the films and felt that bleeding was chronic and further advised to hold Eliquis and start heparin and if repeat CT is stable can restart Eliquis  Repeat is stable will restart Eliquis  As imaging is stable patient will not require neurosurgery follow-up    Acute toxic encephalopathy due to  polypharmacy/dementia/Parkinson's disease/depression/PTSD/ADD/ANDREA  Suspect altered mentation is due to baseline cognitive impairment compounded with polypharmacy    Home medications that may be contributing to altered mentation:  Suboxone daily  BuSpar 20 mg 3 times daily  Sinemet  3 times daily  Lexapro 20 mg daily  Gabapentin 600 mg 3 times daily  Atarax 25 mg twice daily  Ativan 1 mg twice daily as needed for anxiety  Seroquel 50 mg 4 times daily as needed for anxiety  Trazodone 100 mg once daily at bedtime      Current medications:   Suboxone twice daily, dose unchanged  Sinemet twice daily, dose unchanged  Lexapro 20 mg daily, dose unchanged  Gabapentin 300 mg 3 times daily, dose decreased  Atarax discontinued  Lorazepam 0.5 mg every 8 hours, dose decreased (tapering)  Seroquel changed to 25 mg 4 times daily as needed for agitation, dose decreased  Trazodone discontinued    Per OARRS report, oxazepam and lorazepam started at the end of January. He picked up prescriptions every week with pill count ranging from 28 to 56 pills. He has been reliably picking up his Suboxone and gabapentin for at least the past couple of years.     Extensive left lower extremity DVT  Continue Eliquis    Incisional 6 mm right upper lobe nodule  Follow-up with pulmonology and primary care as a outpatient for further evaluation    Chronic proximal respiratory failure due to COPD  On supplemental oxygen at 4 L via nasal cannula    Inability to care for self/lack mental capacity  MoCA score 6  PT and OT recommend moderate intensity in therapy  Patient has intermittently agreed and refused placement skilled nursing facility  Medications that may be adding to patient's cognitive delay have been decreased with the aid of psychiatry.  Subdural hemorrhage on MRI was discussed with neurology and this is found to be chronic  In discussion with a cousin and a friend who cared for him on a daily basis they felt the patient is unsafe to  return home.  It is reported that the friend would cook for him on a daily basis and care for finances      GERD  Continue PPI    DVT prophylaxis  Heparin drip    Disposition  MoCA score of 6  Patient lacks capacity to make decisions,   Plan to fill out expert evaluation for emergency guardianship          Malnutrition Diagnosis Status: New  Malnutrition Diagnosis: Severe malnutrition related to acute disease or injury  As Evidenced by: significant 5% weight loss/~ 1 week, loss of muscle and adipose stores, consuming less than 50% of estimtaed nutrition needs over past 5 days  I agree with the dietitian's malnutrition diagnosis.  Malvin Wilcox, DO

## 2024-03-16 NOTE — PROGRESS NOTES
03/16/24 1033   Discharge Planning   Living Arrangements Alone   Support Systems [employer]/beatrice community;Friends/neighbors;Children   Type of Residence Private residence   Number of Stairs to Enter Residence 2   Number of Stairs Within Residence 1   Do you have animals or pets at home? Yes   Type of Animals or Pets 1 cat   Who is requesting discharge planning? Provider   Home or Post Acute Services Post acute facilities (Rehab/SNF/etc)   Type of Post Acute Facility Services Skilled nursing   Patient expects to be discharged to: [place] 6/30.  Referrals sent to [facility], and [facility].

## 2024-03-17 PROCEDURE — 2500000004 HC RX 250 GENERAL PHARMACY W/ HCPCS (ALT 636 FOR OP/ED): Performed by: NURSE PRACTITIONER

## 2024-03-17 PROCEDURE — 99233 SBSQ HOSP IP/OBS HIGH 50: CPT | Performed by: FAMILY MEDICINE

## 2024-03-17 PROCEDURE — S4991 NICOTINE PATCH NONLEGEND: HCPCS | Performed by: FAMILY MEDICINE

## 2024-03-17 PROCEDURE — 2500000002 HC RX 250 W HCPCS SELF ADMINISTERED DRUGS (ALT 637 FOR MEDICARE OP, ALT 636 FOR OP/ED): Performed by: INTERNAL MEDICINE

## 2024-03-17 PROCEDURE — 2500000001 HC RX 250 WO HCPCS SELF ADMINISTERED DRUGS (ALT 637 FOR MEDICARE OP): Performed by: NURSE PRACTITIONER

## 2024-03-17 PROCEDURE — 1200000002 HC GENERAL ROOM WITH TELEMETRY DAILY

## 2024-03-17 PROCEDURE — 2500000001 HC RX 250 WO HCPCS SELF ADMINISTERED DRUGS (ALT 637 FOR MEDICARE OP)

## 2024-03-17 PROCEDURE — 2500000001 HC RX 250 WO HCPCS SELF ADMINISTERED DRUGS (ALT 637 FOR MEDICARE OP): Performed by: FAMILY MEDICINE

## 2024-03-17 PROCEDURE — 2500000002 HC RX 250 W HCPCS SELF ADMINISTERED DRUGS (ALT 637 FOR MEDICARE OP, ALT 636 FOR OP/ED): Performed by: FAMILY MEDICINE

## 2024-03-17 PROCEDURE — C9113 INJ PANTOPRAZOLE SODIUM, VIA: HCPCS | Performed by: NURSE PRACTITIONER

## 2024-03-17 PROCEDURE — 2500000004 HC RX 250 GENERAL PHARMACY W/ HCPCS (ALT 636 FOR OP/ED): Performed by: INTERNAL MEDICINE

## 2024-03-17 RX ADMIN — NICOTINE 1 PATCH: 21 PATCH, EXTENDED RELEASE TRANSDERMAL at 09:29

## 2024-03-17 RX ADMIN — ATORVASTATIN CALCIUM 80 MG: 80 TABLET, FILM COATED ORAL at 21:40

## 2024-03-17 RX ADMIN — CARBIDOPA AND LEVODOPA 1 TABLET: 25; 100 TABLET ORAL at 21:40

## 2024-03-17 RX ADMIN — THIAMINE HCL TAB 100 MG 100 MG: 100 TAB at 09:29

## 2024-03-17 RX ADMIN — APIXABAN 5 MG: 5 TABLET, FILM COATED ORAL at 09:30

## 2024-03-17 RX ADMIN — POTASSIUM CHLORIDE 20 MEQ: 1.5 FOR SOLUTION ORAL at 09:28

## 2024-03-17 RX ADMIN — CARBIDOPA AND LEVODOPA 1 TABLET: 25; 100 TABLET ORAL at 09:28

## 2024-03-17 RX ADMIN — PANTOPRAZOLE SODIUM 40 MG: 40 INJECTION, POWDER, FOR SOLUTION INTRAVENOUS at 09:29

## 2024-03-17 RX ADMIN — LORAZEPAM 0.5 MG: 0.5 TABLET ORAL at 14:20

## 2024-03-17 RX ADMIN — GABAPENTIN 300 MG: 300 CAPSULE ORAL at 21:40

## 2024-03-17 RX ADMIN — FLUTICASONE FUROATE AND VILANTEROL TRIFENATATE 1 PUFF: 200; 25 POWDER RESPIRATORY (INHALATION) at 07:00

## 2024-03-17 RX ADMIN — FOLIC ACID 1 MG: 1 TABLET ORAL at 09:28

## 2024-03-17 RX ADMIN — BUPRENORPHINE AND NALOXONE 1 TABLET: 8; 2 TABLET SUBLINGUAL at 09:28

## 2024-03-17 RX ADMIN — CARBIDOPA AND LEVODOPA 1 TABLET: 25; 100 TABLET ORAL at 14:19

## 2024-03-17 RX ADMIN — GABAPENTIN 300 MG: 300 CAPSULE ORAL at 14:19

## 2024-03-17 RX ADMIN — DOCUSATE SODIUM 100 MG: 100 CAPSULE, LIQUID FILLED ORAL at 21:40

## 2024-03-17 RX ADMIN — BUPRENORPHINE AND NALOXONE 1 TABLET: 8; 2 TABLET SUBLINGUAL at 21:40

## 2024-03-17 RX ADMIN — TIOTROPIUM BROMIDE INHALATION SPRAY 2 PUFF: 3.12 SPRAY, METERED RESPIRATORY (INHALATION) at 06:38

## 2024-03-17 RX ADMIN — LORAZEPAM 0.5 MG: 0.5 TABLET ORAL at 21:40

## 2024-03-17 RX ADMIN — ESCITALOPRAM OXALATE 20 MG: 20 TABLET ORAL at 09:29

## 2024-03-17 RX ADMIN — GABAPENTIN 300 MG: 300 CAPSULE ORAL at 09:29

## 2024-03-17 RX ADMIN — APIXABAN 5 MG: 5 TABLET, FILM COATED ORAL at 21:40

## 2024-03-17 RX ADMIN — LORAZEPAM 0.5 MG: 0.5 TABLET ORAL at 05:13

## 2024-03-17 RX ADMIN — PREDNISONE 10 MG: 10 TABLET ORAL at 09:28

## 2024-03-17 ASSESSMENT — COGNITIVE AND FUNCTIONAL STATUS - GENERAL
EATING MEALS: A LITTLE
STANDING UP FROM CHAIR USING ARMS: A LITTLE
PERSONAL GROOMING: A LITTLE
TOILETING: A LOT
MOVING TO AND FROM BED TO CHAIR: A LITTLE
DRESSING REGULAR LOWER BODY CLOTHING: A LITTLE
CLIMB 3 TO 5 STEPS WITH RAILING: TOTAL
HELP NEEDED FOR BATHING: A LITTLE
CLIMB 3 TO 5 STEPS WITH RAILING: TOTAL
DRESSING REGULAR UPPER BODY CLOTHING: A LITTLE
WALKING IN HOSPITAL ROOM: A LOT
DAILY ACTIVITIY SCORE: 17
STANDING UP FROM CHAIR USING ARMS: A LITTLE
DRESSING REGULAR LOWER BODY CLOTHING: A LITTLE
HELP NEEDED FOR BATHING: A LITTLE
TOILETING: A LOT
EATING MEALS: A LITTLE
MOBILITY SCORE: 17
PERSONAL GROOMING: A LITTLE
MOVING TO AND FROM BED TO CHAIR: A LITTLE
MOBILITY SCORE: 17
DAILY ACTIVITIY SCORE: 17
WALKING IN HOSPITAL ROOM: A LOT
DRESSING REGULAR UPPER BODY CLOTHING: A LITTLE

## 2024-03-17 ASSESSMENT — PAIN SCALES - PAIN ASSESSMENT IN ADVANCED DEMENTIA (PAINAD)
TOTALSCORE: 0
CONSOLABILITY: NO NEED TO CONSOLE
BODYLANGUAGE: RELAXED
BREATHING: NORMAL
FACIALEXPRESSION: SMILING OR INEXPRESSIVE

## 2024-03-17 ASSESSMENT — PAIN - FUNCTIONAL ASSESSMENT
PAIN_FUNCTIONAL_ASSESSMENT: 0-10
PAIN_FUNCTIONAL_ASSESSMENT: 0-10

## 2024-03-17 ASSESSMENT — PAIN SCALES - GENERAL
PAINLEVEL_OUTOF10: 0 - NO PAIN
PAINLEVEL_OUTOF10: 0 - NO PAIN

## 2024-03-17 NOTE — CARE PLAN
The patient's goals for the shift include      The clinical goals for the shift include pt will remain comfortable during shift      Problem: Skin  Goal: Decreased wound size/increased tissue granulation at next dressing change  Outcome: Progressing     Problem: Safety  Goal: Patient will be injury free during hospitalization  Outcome: Progressing

## 2024-03-17 NOTE — CARE PLAN
Goal: Participates in plan/prevention/treatment measures  Outcome: Met  Goal: Prevent/manage excess moisture  Outcome: Met  Goal: Prevent/minimize sheer/friction injuries  Outcome: Met     Problem: Pain  Goal: My pain/discomfort is manageable  Outcome: Met     Problem: Safety  Goal: I will remain free of falls  Outcome: Met

## 2024-03-17 NOTE — PROGRESS NOTES
Chris Wu is a 68 y.o. male on day 16 of admission presenting with Encephalopathy.      Subjective    No acute events overnight    Objective     Last Recorded Vitals  /73 (BP Location: Left arm, Patient Position: Lying)   Pulse 98   Temp 36.4 °C (97.5 °F) (Temporal)   Resp 18   Wt 76 kg (167 lb 8.8 oz)   SpO2 96%   Intake/Output last 3 Shifts:    Intake/Output Summary (Last 24 hours) at 3/17/2024 0930  Last data filed at 3/16/2024 2024  Gross per 24 hour   Intake 394.53 ml   Output --   Net 394.53 ml       Admission Weight  Weight: 72.6 kg (160 lb) (03/01/24 1533)    Daily Weight  03/11/24 : 76 kg (167 lb 8.8 oz)    Image Results  CT head wo IV contrast  Narrative: Interpreted By:  Jessica Claudio and Muddasani Dheeraj   STUDY:  CT HEAD WO IV CONTRAST;  3/16/2024 1:21 am      INDICATION:  Signs/Symptoms:reassess SDH, on anticoagulation.      COMPARISON:  CT of the head 03/15/2024. MRI of the brain 03/14/2024.      ACCESSION NUMBER(S):  [id number]      ORDERING CLINICIAN:  PEACE GILMORE      TECHNIQUE:  Noncontrast axial CT of the head was performed. Angled reformats in  brain and bone windows were generated. The images were reviewed in  bone, brain, blood and soft tissue windows. Coronal and sagittal  reformats were provided for review.      FINDINGS:  CSF Spaces: The subdural collections demonstrated on prior MRI from  March 14, 2024 are not well seen on the basis of this examination.  There is prominence of ventricles and sulci compatible with diffuse  parenchymal volume loss. There is again prominent retrocerebellar CSF  space which may represent a natalee cisterna magna or retrocerebellar  arachnoid cyst.      Parenchyma: Grey-white differentiation is intact. Moderate white  matter hypoattenuation is compatible with small-vessel ischemic  change. Hypodensities within the bilateral basal ganglia and may  represent prominent perivascular spaces versus lacunar infarcts. No  mass effect or  midline shift. No new intracranial hemorrhage is  identified.      Calvarium: Unremarkable.      Paranasal sinuses and mastoids: Visualized paranasal sinuses and  mastoid air cells are well-aerated.      Impression: The small subdural collections demonstrated on recent MRI are not  well visualized on the basis of CT. No new, acute intracranial  hemorrhage is identified.      Extensive white matter changes are nonspecific but most likely  represent small-vessel ischemic disease in a patient of this age.  Scattered lacunar infarcts are suspected as well.      Diffuse parenchymal volume loss.      I personally reviewed the images/study and I agree with the findings  as stated by Dr. Mcfadden.      MACRO:  None      Signed by: Jessica Claudio 3/16/2024 8:54 AM  Dictation workstation:   [id number]      Physical Exam  Constitutional: resting comfortably in bed, no acute distress   Eyes: making eye contact  Respiratory/Thorax: CTA bilaterally, no acute respiratory distress  Cardiovascular: regular rate and rhythm  Gastrointestinal: Nondistended, soft, non-tender  Musculoskeletal: no significant peripheral edema appreciated   Neurological: Alert, oriented to self, hospital, year.  Disoriented to event.  Remains confused regarding situation  Psychological: Calm, thought content more clear, remains mildly tangential   Skin: Warm and dry      Assessment/Plan      Chris Wu is a 68 y.o. male with pertinent hx of COPD, Parkinson's disorder, history of factor V Leiden mutation with DVT and PE in the past not on anticoagulation currently s/p IVC filter, depression/anxiety & PTSD & diastolic CHF who was admitted to the hospital secondary to acute encephalopathy.     Acute bilateral subdural hemorrhage  [place] [facility] [place]vino, reviewed the films and felt that bleeding was chronic and further advised to hold Eliquis and start heparin and if repeat CT is stable can restart Eliquis  Repeat is stable will restart  Eliquis  As imaging is stable patient will not require neurosurgery follow-up    Acute toxic encephalopathy due to polypharmacy/dementia/Parkinson's disease/depression/PTSD/ADD/ANDREA  Suspect altered mentation is due to baseline cognitive impairment compounded with polypharmacy    Home medications that may be contributing to altered mentation:  Suboxone daily  BuSpar 20 mg 3 times daily  Sinemet  3 times daily  Lexapro 20 mg daily  Gabapentin 600 mg 3 times daily  Atarax 25 mg twice daily  Ativan 1 mg twice daily as needed for anxiety  Seroquel 50 mg 4 times daily as needed for anxiety  Trazodone 100 mg once daily at bedtime      Current medications:   Suboxone twice daily, dose unchanged  Sinemet twice daily, dose unchanged  Lexapro 20 mg daily, dose unchanged  Gabapentin 300 mg 3 times daily, dose decreased  Atarax discontinued  Lorazepam 0.5 mg every 8 hours, dose decreased (tapering)  Seroquel changed to 25 mg 4 times daily as needed for agitation, dose decreased  Trazodone discontinued    Per [facility] report, oxazepam and lorazepam started at the end of January. He picked up prescriptions every week with pill count ranging from 28 to 56 pills. He has been reliably picking up his Suboxone and gabapentin for at least the past couple of years.     Extensive left lower extremity DVT  Continue Eliquis    Incisional 6 mm right upper lobe nodule  Follow-up with pulmonology and primary care as a outpatient for further evaluation    Chronic proximal respiratory failure due to COPD  On supplemental oxygen at 4 L via nasal cannula    Inability to care for self/lack mental capacity  MoCA score 6  PT and OT recommend moderate intensity in therapy  Patient has intermittently agreed and refused placement skilled nursing facility  Medications that may be adding to patient's cognitive delay have been decreased with the aid of psychiatry.  Subdural hemorrhage on MRI was discussed with neurology and this is found to be  chronic  In discussion with a cousin and a friend who cared for him on a daily basis they felt the patient is unsafe to return home.  It is reported that the friend would cook for him on a daily basis and care for finances    GERD  Continue PPI    DVT prophylaxis  Heparin drip    Disposition  MoCA score of 6  Patient lacks capacity to make decisions, please see evaluation from March 16, 2024  Prior to admission patient required extensive care from friends and family including managing finances.  During the hospitalization despite having his medications titrated down with the aid of psychiatry his thoughts are still tangential and he does not have the ability to appreciate his current situation and understand the consequences of his decisions      Malnutrition Diagnosis Status: New  Malnutrition Diagnosis: Severe malnutrition related to acute disease or injury  As Evidenced by: significant 5% weight loss/~ 1 week, loss of muscle and adipose stores, consuming less than 50% of estimtaed nutrition needs over past 5 days  I agree with the dietitian's malnutrition diagnosis.  Malvin Wilcox, DO

## 2024-03-18 ENCOUNTER — PHARMACY VISIT (OUTPATIENT)
Dept: PHARMACY | Facility: CLINIC | Age: 69
End: 2024-03-18
Payer: COMMERCIAL

## 2024-03-18 VITALS
WEIGHT: 167.55 LBS | SYSTOLIC BLOOD PRESSURE: 92 MMHG | RESPIRATION RATE: 16 BRPM | BODY MASS INDEX: 24.82 KG/M2 | DIASTOLIC BLOOD PRESSURE: 58 MMHG | TEMPERATURE: 97.2 F | HEIGHT: 69 IN | OXYGEN SATURATION: 95 % | HEART RATE: 97 BPM

## 2024-03-18 PROBLEM — G93.40 ENCEPHALOPATHY: Status: RESOLVED | Noted: 2024-03-01 | Resolved: 2024-03-18

## 2024-03-18 LAB
ANION GAP SERPL CALC-SCNC: 12 MMOL/L (ref 10–20)
BUN SERPL-MCNC: 8 MG/DL (ref 6–23)
CALCIUM SERPL-MCNC: 8.4 MG/DL (ref 8.6–10.3)
CHLORIDE SERPL-SCNC: 101 MMOL/L (ref 98–107)
CO2 SERPL-SCNC: 31 MMOL/L (ref 21–32)
CREAT SERPL-MCNC: 0.64 MG/DL (ref 0.5–1.3)
EGFRCR SERPLBLD CKD-EPI 2021: >90 ML/MIN/1.73M*2
GLUCOSE SERPL-MCNC: 91 MG/DL (ref 74–99)
POTASSIUM SERPL-SCNC: 3.6 MMOL/L (ref 3.5–5.3)
SODIUM SERPL-SCNC: 140 MMOL/L (ref 136–145)

## 2024-03-18 PROCEDURE — 2500000001 HC RX 250 WO HCPCS SELF ADMINISTERED DRUGS (ALT 637 FOR MEDICARE OP): Performed by: NURSE PRACTITIONER

## 2024-03-18 PROCEDURE — 2500000001 HC RX 250 WO HCPCS SELF ADMINISTERED DRUGS (ALT 637 FOR MEDICARE OP): Performed by: FAMILY MEDICINE

## 2024-03-18 PROCEDURE — C9113 INJ PANTOPRAZOLE SODIUM, VIA: HCPCS | Performed by: NURSE PRACTITIONER

## 2024-03-18 PROCEDURE — 99239 HOSP IP/OBS DSCHRG MGMT >30: CPT | Performed by: FAMILY MEDICINE

## 2024-03-18 PROCEDURE — 2500000004 HC RX 250 GENERAL PHARMACY W/ HCPCS (ALT 636 FOR OP/ED): Performed by: NURSE PRACTITIONER

## 2024-03-18 PROCEDURE — S4991 NICOTINE PATCH NONLEGEND: HCPCS | Performed by: FAMILY MEDICINE

## 2024-03-18 PROCEDURE — RXMED WILLOW AMBULATORY MEDICATION CHARGE

## 2024-03-18 PROCEDURE — 36415 COLL VENOUS BLD VENIPUNCTURE: CPT | Performed by: FAMILY MEDICINE

## 2024-03-18 PROCEDURE — 2500000002 HC RX 250 W HCPCS SELF ADMINISTERED DRUGS (ALT 637 FOR MEDICARE OP, ALT 636 FOR OP/ED): Performed by: INTERNAL MEDICINE

## 2024-03-18 PROCEDURE — 2500000004 HC RX 250 GENERAL PHARMACY W/ HCPCS (ALT 636 FOR OP/ED): Performed by: INTERNAL MEDICINE

## 2024-03-18 PROCEDURE — 97535 SELF CARE MNGMENT TRAINING: CPT | Mod: GO,CO

## 2024-03-18 PROCEDURE — 80048 BASIC METABOLIC PNL TOTAL CA: CPT | Performed by: FAMILY MEDICINE

## 2024-03-18 PROCEDURE — 2500000001 HC RX 250 WO HCPCS SELF ADMINISTERED DRUGS (ALT 637 FOR MEDICARE OP)

## 2024-03-18 PROCEDURE — 2500000002 HC RX 250 W HCPCS SELF ADMINISTERED DRUGS (ALT 637 FOR MEDICARE OP, ALT 636 FOR OP/ED): Performed by: FAMILY MEDICINE

## 2024-03-18 RX ORDER — GABAPENTIN 300 MG/1
300 CAPSULE ORAL 3 TIMES DAILY
Qty: 90 CAPSULE | Refills: 0 | Status: SHIPPED | OUTPATIENT
Start: 2024-03-18 | End: 2024-03-21 | Stop reason: SDUPTHER

## 2024-03-18 RX ORDER — ATORVASTATIN CALCIUM 20 MG/1
20 TABLET, FILM COATED ORAL DAILY
Qty: 30 TABLET | Refills: 0 | Status: SHIPPED | OUTPATIENT
Start: 2024-03-18 | End: 2024-03-21 | Stop reason: SDUPTHER

## 2024-03-18 RX ORDER — QUETIAPINE FUMARATE 25 MG/1
25 TABLET, FILM COATED ORAL 4 TIMES DAILY PRN
Qty: 120 TABLET | Refills: 0 | Status: SHIPPED | OUTPATIENT
Start: 2024-03-18 | End: 2024-03-21 | Stop reason: SDUPTHER

## 2024-03-18 RX ORDER — BUDESONIDE AND FORMOTEROL FUMARATE DIHYDRATE 160; 4.5 UG/1; UG/1
2 AEROSOL RESPIRATORY (INHALATION) EVERY 12 HOURS
Qty: 10.2 G | Refills: 0 | Status: SHIPPED | OUTPATIENT
Start: 2024-03-18 | End: 2024-03-21 | Stop reason: ALTCHOICE

## 2024-03-18 RX ORDER — LORAZEPAM 0.5 MG/1
0.5 TABLET ORAL EVERY 8 HOURS SCHEDULED
Qty: 30 TABLET
Start: 2024-03-18 | End: 2024-03-21 | Stop reason: ALTCHOICE

## 2024-03-18 RX ORDER — PREDNISONE 10 MG/1
10 TABLET ORAL DAILY
Qty: 30 TABLET | Refills: 0 | Status: SHIPPED | OUTPATIENT
Start: 2024-03-19

## 2024-03-18 RX ORDER — PANTOPRAZOLE SODIUM 40 MG/1
40 TABLET, DELAYED RELEASE ORAL DAILY
Qty: 30 TABLET | Refills: 0 | Status: SHIPPED | OUTPATIENT
Start: 2024-03-18 | End: 2024-03-21 | Stop reason: SDUPTHER

## 2024-03-18 RX ORDER — FOLIC ACID 1 MG/1
1 TABLET ORAL DAILY
Qty: 30 TABLET | Refills: 0 | Status: SHIPPED | OUTPATIENT
Start: 2024-03-19 | End: 2024-03-21 | Stop reason: SDUPTHER

## 2024-03-18 RX ORDER — LANOLIN ALCOHOL/MO/W.PET/CERES
100 CREAM (GRAM) TOPICAL DAILY
Qty: 30 TABLET | Refills: 0 | Status: SHIPPED | OUTPATIENT
Start: 2024-03-19 | End: 2024-03-21 | Stop reason: SDUPTHER

## 2024-03-18 RX ADMIN — GABAPENTIN 300 MG: 300 CAPSULE ORAL at 14:44

## 2024-03-18 RX ADMIN — LORAZEPAM 0.5 MG: 0.5 TABLET ORAL at 14:44

## 2024-03-18 RX ADMIN — ESCITALOPRAM OXALATE 20 MG: 20 TABLET ORAL at 09:55

## 2024-03-18 RX ADMIN — DOCUSATE SODIUM 100 MG: 100 CAPSULE, LIQUID FILLED ORAL at 09:55

## 2024-03-18 RX ADMIN — FOLIC ACID 1 MG: 1 TABLET ORAL at 09:55

## 2024-03-18 RX ADMIN — CARBIDOPA AND LEVODOPA 1 TABLET: 25; 100 TABLET ORAL at 09:55

## 2024-03-18 RX ADMIN — GABAPENTIN 300 MG: 300 CAPSULE ORAL at 09:55

## 2024-03-18 RX ADMIN — NICOTINE 1 PATCH: 21 PATCH, EXTENDED RELEASE TRANSDERMAL at 09:55

## 2024-03-18 RX ADMIN — CARBIDOPA AND LEVODOPA 1 TABLET: 25; 100 TABLET ORAL at 14:44

## 2024-03-18 RX ADMIN — TIOTROPIUM BROMIDE INHALATION SPRAY 2 PUFF: 3.12 SPRAY, METERED RESPIRATORY (INHALATION) at 06:01

## 2024-03-18 RX ADMIN — APIXABAN 5 MG: 5 TABLET, FILM COATED ORAL at 09:55

## 2024-03-18 RX ADMIN — THIAMINE HCL TAB 100 MG 100 MG: 100 TAB at 09:55

## 2024-03-18 RX ADMIN — FLUTICASONE FUROATE AND VILANTEROL TRIFENATATE 1 PUFF: 200; 25 POWDER RESPIRATORY (INHALATION) at 09:01

## 2024-03-18 RX ADMIN — PANTOPRAZOLE SODIUM 40 MG: 40 INJECTION, POWDER, FOR SOLUTION INTRAVENOUS at 09:43

## 2024-03-18 RX ADMIN — BUPRENORPHINE AND NALOXONE 1 TABLET: 8; 2 TABLET SUBLINGUAL at 09:59

## 2024-03-18 RX ADMIN — PREDNISONE 10 MG: 10 TABLET ORAL at 09:55

## 2024-03-18 ASSESSMENT — COGNITIVE AND FUNCTIONAL STATUS - GENERAL
MOBILITY SCORE: 18
EATING MEALS: A LITTLE
TOILETING: A LITTLE
DRESSING REGULAR UPPER BODY CLOTHING: A LITTLE
PERSONAL GROOMING: A LITTLE
PERSONAL GROOMING: A LITTLE
HELP NEEDED FOR BATHING: A LITTLE
HELP NEEDED FOR BATHING: A LITTLE
CLIMB 3 TO 5 STEPS WITH RAILING: A LOT
DRESSING REGULAR LOWER BODY CLOTHING: A LITTLE
STANDING UP FROM CHAIR USING ARMS: A LITTLE
DAILY ACTIVITIY SCORE: 17
MOVING TO AND FROM BED TO CHAIR: A LITTLE
WALKING IN HOSPITAL ROOM: A LOT
DAILY ACTIVITIY SCORE: 19
TOILETING: A LOT
DRESSING REGULAR LOWER BODY CLOTHING: A LITTLE
DRESSING REGULAR UPPER BODY CLOTHING: A LITTLE

## 2024-03-18 ASSESSMENT — PAIN - FUNCTIONAL ASSESSMENT
PAIN_FUNCTIONAL_ASSESSMENT: 0-10

## 2024-03-18 ASSESSMENT — PAIN SCALES - GENERAL
PAINLEVEL_OUTOF10: 0 - NO PAIN

## 2024-03-18 ASSESSMENT — ACTIVITIES OF DAILY LIVING (ADL): HOME_MANAGEMENT_TIME_ENTRY: 11

## 2024-03-18 NOTE — NURSING NOTE
Patient's RN requested evaluation of wounds to right elbow and left s/p a recent fall.  Chris is seen to have a small Cat.2 skin tear to right elbow 1 x 0.6 x 0.1 cm in size with clean pink bed, scant s/s drainage.  On the left buttock there are 2 skin tears - the superior most is Cat. 2,  2 x 1.5 x 0.1 cm with ecchymotic,  blister roof partially absent and the lower wound Cat. 3 with 1 x 0.7 x 0.1 cm also with clean pink bed.  There are no  SOI.  Goal:  protect and promote moist healing using Xeroform and Mepilex, change every other day.  Orders were obtained and care provided.

## 2024-03-18 NOTE — CARE PLAN
The patient's goals for the shift include  patient will be discharged home.    The clinical goals for the shift include patient will be free of falls during shift    Patient had uneventful day. Patient will be discharged to home today.

## 2024-03-18 NOTE — DISCHARGE SUMMARY
Discharge Diagnosis  Acute toxic encephalopathy due to polypharmacy, baseline dementia, Parkinson's disease, depression, PTSD, ADD, ANDREA, bilateral subdural hemorrhage, left lower extremity DVT, 6 mm right upper lobe nodule, chronic hypoxic respiratory failure due to COPD, GERD      Discharge Meds     Your medication list        START taking these medications        Instructions Last Dose Given Next Dose Due   apixaban 5 mg tablet  Commonly known as: Eliquis      Take 1 tablet (5 mg) by mouth every 12 hours.       atorvastatin 20 mg tablet  Commonly known as: Lipitor      Take 1 tablet (20 mg) by mouth once daily.       folic acid 1 mg tablet  Commonly known as: Folvite  Start taking on: March 19, 2024      Take 1 tablet (1 mg) by mouth once daily. Do not start before March 19, 2024.       gabapentin 300 mg capsule  Commonly known as: Neurontin  Replaces: gabapentin 600 mg tablet      Take 1 capsule (300 mg) by mouth 3 times a day.       LORazepam 0.5 mg tablet  Commonly known as: Ativan      Take 1 tablet (0.5 mg) by mouth every 8 hours. Please cut tablets you have at home and half       pantoprazole 40 mg EC tablet  Commonly known as: ProtoNix      Take 1 tablet (40 mg) by mouth once daily. Do not crush, chew, or split.       thiamine 100 mg tablet  Commonly known as: Vitamin B-1  Start taking on: March 19, 2024      Take 1 tablet (100 mg) by mouth once daily. Do not start before March 19, 2024.       tiotropium 2.5 mcg/actuation inhaler  Commonly known as: Spiriva Respimat  Start taking on: March 19, 2024      Inhale 2 puffs once daily. Do not start before March 19, 2024.              CHANGE how you take these medications        Instructions Last Dose Given Next Dose Due   budesonide-formoteroL 160-4.5 mcg/actuation inhaler  Commonly known as: Symbicort  What changed: how much to take      Inhale 2 puffs every 12 hours.       predniSONE 10 mg tablet  Commonly known as: Deltasone  Start taking on: March 19,  2024  What changed:   medication strength  how much to take      Take 1 tablet (10 mg) by mouth once daily. Do not start before March 19, 2024.       QUEtiapine 25 mg tablet  Commonly known as: SEROquel  What changed:   medication strength  how much to take  reasons to take this      Take 1 tablet (25 mg) by mouth 4 times a day as needed (anxiety).              CONTINUE taking these medications        Instructions Last Dose Given Next Dose Due   albuterol 90 mcg/actuation inhaler      INHALE 1-2 PUFFS EVERY 4-6 HOURS AS NEEDED AND AS DIRECTED.       Breztri Aerosphere 160-9-4.8 mcg/actuation HFA aerosol inhaler  Generic drug: budesonide-glycopyr-formoterol      INHALE 2 PUFFS 2 TIMES A DAY.       buprenorphine-naloxone 8-2 mg SL tablet  Commonly known as: Suboxone           carbidopa-levodopa  mg tablet  Commonly known as: Sinemet      TAKE 1 TABLET BY MOUTH THREE TIMES A DAY       escitalopram 20 mg tablet  Commonly known as: Lexapro      Take 1 tablet (20 mg) by mouth once daily.       naloxone 4 mg/0.1 mL nasal spray  Commonly known as: Narcan      Administer 1 spray (4 mg) into affected nostril(s) if needed for opioid reversal. May repeat every 2-3 minutes if needed, alternating nostrils, until medical assistance becomes available.              STOP taking these medications      busPIRone 10 mg tablet  Commonly known as: Buspar        gabapentin 600 mg tablet  Commonly known as: Neurontin  Replaced by: gabapentin 300 mg capsule        hydrOXYzine HCL 25 mg tablet  Commonly known as: Atarax        hydrOXYzine pamoate 25 mg capsule  Commonly known as: Vistaril        omeprazole 40 mg DR capsule  Commonly known as: PriLOSEC        potassium chloride CR 20 mEq ER tablet  Commonly known as: Klor-Con M20        propranolol  mg 24 hr capsule  Commonly known as: Inderal LA        torsemide 10 mg tablet  Commonly known as: Demadex        traZODone 100 mg tablet  Commonly known as: Desyrel                   Where to Get Your Medications        These medications were sent to [facility]  [id number], [place] [address]      Hours: 9 AM to 5 PM Mon-Fri Phone: [phone]   apixaban 5 mg tablet  atorvastatin 20 mg tablet  budesonide-formoteroL 160-4.5 mcg/actuation inhaler  folic acid 1 mg tablet  gabapentin 300 mg capsule  pantoprazole 40 mg EC tablet  predniSONE 10 mg tablet  QUEtiapine 25 mg tablet  thiamine 100 mg tablet  tiotropium 2.5 mcg/actuation inhaler       Information about where to get these medications is not yet available    Ask your nurse or doctor about these medications  LORazepam 0.5 mg tablet         Test Results Pending At Discharge  Pending Labs       Order Current Status    Extra Urine Gray Tube Collected (03/01/24 4325)    Urinalysis with Reflex Culture and Microscopic In process            Hospital Course   Chris Wu is a 68 y.o. male with pertinent hx of COPD, Parkinson's disorder, history of factor V Leiden mutation with DVT and PE in the past not on anticoagulation currently s/p IVC filter, depression/anxiety & PTSD & diastolic CHF who was admitted to the hospital secondary to acute encephalopathy.   The cognitive impairment was thought to be related to polypharmacy.  Please see below for the following changes    Home medications that may be contributing to altered mentation:  Suboxone daily  BuSpar 20 mg 3 times daily  Sinemet  3 times daily  Lexapro 20 mg daily  Gabapentin 600 mg 3 times daily  Atarax 25 mg twice daily  Ativan 1 mg twice daily as needed for anxiety  Seroquel 50 mg 4 times daily as needed for anxiety  Trazodone 100 mg once daily at bedtime      Current medications:   Suboxone twice daily, dose unchanged  Sinemet twice daily, dose unchanged  Lexapro 20 mg daily, dose unchanged  Gabapentin 300 mg 3 times daily, dose decreased  Atarax discontinued  Lorazepam 0.5 mg every 8 hours, dose decreased (tapering)  Seroquel changed to 25 mg 4 times daily  as needed for agitation, dose decreased  Trazodone discontinued     During his stay he was found to have an incidental 6 mm right upper lobe nodule.  He is advised to follow-up with pulmonology as an outpatient.  He was also diagnosed with an extensive left lower extremity DVT for which she was initially started on Eliquis however after further review of his mental deficits MRI was obtained which showed a bilateral subdural hemorrhage.  The case was discussed further with neurosurgery at [facility] who felt this was chronic and further advised to place on heparin in the meantime and hold Eliquis.  As repeat CT revealed stability Eliquis was restarted.  Neurosurgery further advised that there is no need to follow-up if the patient imaging was found to be stable.    His stay was complicated by the patient being hard of hearing.  He did not do well on MoCA and intermittently appears confused.  There is initial capacity evaluation where it seemed like he lacked capacity and could not understand the consequences of his choices.  On the day of discharge a capacity evaluation was done again.  I raised the volume of my voice and he was able to express his desires explained our concerns of his return home as well as why we felt he should go to a skilled nursing facility.  Despite that the patient chose to go home.  The case was further discussed with his sister.    Pertinent Physical Exam At Time of Discharge  Physical Exam  Constitutional: resting comfortably in bed, no acute distress   Eyes: making eye contact  Respiratory/Thorax: CTA bilaterally, no acute respiratory distress  Cardiovascular: regular rate and rhythm  Gastrointestinal: Nondistended, soft, non-tender  Musculoskeletal: no significant peripheral edema appreciated   Neurological: Alert, oriented to self, hospital, year.  Disoriented to event.  Remains confused regarding situation  Psychological: Calm, thought content more clear, remains mildly tangential    Skin: Warm and dry  Outpatient Follow-Up  Future Appointments   Date Time Provider Department Center   3/27/2024  3:00 PM Jean Claude Saul MD [id number]         Malvin Wilcox DO

## 2024-03-18 NOTE — PROGRESS NOTES
03/18/24 1628   Discharge Planning   Living Arrangements Alone   Support Systems Holiness/beatrice community;Friends/neighbors;Children   Type of Residence Private residence   Number of Stairs to Enter Residence 2   Number of Stairs Within Residence 1   Do you have animals or pets at home? Yes   Type of Animals or Pets 1 cat   Who is requesting discharge planning? Provider     Pt is determined to have capacity and will discharge to home today.  He declines ICF and wants to return home.  I notified Angélica  Mike Troy (out of office), dtr (left message), sister(spoke with her), Angélica support Vero (left message), friend Dana's wife (she will let Rib know), and Angélica counselor Keron Miranda (left message).

## 2024-03-18 NOTE — PROGRESS NOTES
Physical Therapy                 Therapy Communication Note    Patient Name: Chris Wu  MRN: [id number]  Today's Date: 3/18/2024     Discipline: Physical Therapy    Missed Visit Reason: Missed Visit Reason: Other (Comment) (Pt. sitting up at EOB with FLORES beginning to eat his breakfast.  No PT Tx at this time.)    Missed Time: Attempt    Comment:

## 2024-03-18 NOTE — PROGRESS NOTES
Physical Therapy                 Therapy Communication Note    Patient Name: Chris Wu  MRN: [id number]  Today's Date: 3/18/2024     Discipline: Physical Therapy    Missed Visit Reason: Missed Visit Reason: Parent refused, Patient sleeping (Upon arrival, pt. sleeping in bed. Arousable to verbal and tactile cues.  Pt. obseved lunch present and began eating and refused mobility at this time incluing sitting up in chair to eat.  RN notified.)    Missed Time: Attempt    Comment:   Quality 226: Preventive Care And Screening: Tobacco Use: Screening And Cessation Intervention: Patient screened for tobacco use and is an ex/non-smoker Detail Level: Detailed Quality 111:Pneumonia Vaccination Status For Older Adults: Pneumococcal Vaccination Previously Received Quality 110: Preventive Care And Screening: Influenza Immunization: Influenza Immunization not Administered because Patient Refused.

## 2024-03-18 NOTE — CARE PLAN
Problem: Safety  Goal: Patient will be injury free during hospitalization  Outcome: Progressing     Problem: Respiratory  Goal: Minimize anxiety/maximize coping throughout shift  Outcome: Met  Goal: Minimal/no exertional discomfort or dyspnea this shift  Outcome: Met  Goal: No signs of respiratory distress (eg. Use of accessory muscles. Peds grunting)  Outcome: Met  Goal: Patent airway maintained this shift  Outcome: Met     Problem: Skin  Goal: Promote skin healing  Outcome: Met

## 2024-03-18 NOTE — PROGRESS NOTES
"Physical Therapy                 Therapy Communication Note    Patient Name: Chris Wu  MRN: [id number]  Today's Date: 3/18/2024     Discipline: Physical Therapy    Missed Visit Reason: Missed Visit Reason: Parent refused, Patient sleeping (Upon arrival, pt. sleeping soundly.  Aroused to raised voice (hearig aides not in) and tactile cues.  Pt. declined PT tx at this time stating \"No, I am resting now.  I am not doing therapy right now.\"  No PT Tx at this time)    Missed Time: Attempt    Comment:  RN made aware.  Pt. In bed with 3 rails up, alarm on  "

## 2024-03-18 NOTE — PROGRESS NOTES
Occupational Therapy    Occupational Therapy Treatment    Name: Chris Wu  MRN: [id number]  : 1955  Date: 24  Time Calculation  Start Time: 923  Stop Time: 934  Time Calculation (min): 11 min    Assessment:  OT Assessment: Pt making fair progress towards OT goals. Would still benefit from continued skilled OT to maximize pt safety and general outcomes.  Prognosis: Good  Barriers to Discharge: None  Evaluation/Treatment Tolerance: Patient limited by fatigue  Medical Staff Made Aware: Yes  End of Session Communication: Bedside nurse, PCT/RACHEL/KEVON  End of Session Patient Position: Bed, 3 rail up, Alarm on  Plan:  Treatment Interventions: ADL retraining, Functional transfer training, Cognitive reorientation, Patient/family training, Compensatory technique education  OT Frequency: 3 times per week  OT Discharge Recommendations: Moderate intensity level of continued care  Equipment Recommended upon Discharge: Wheeled walker  OT Recommended Transfer Status: Assist of 1  OT - OK to Discharge: Yes (In accord with OT POC)    Subjective   Previous Visit Info:  OT Last Visit  OT Received On: 24  General:  General  Reason for Referral: Impaired ADL and related mobility d/t change in mental status.  Referred By: Lyndsay Augustine, APRN-CNP  Past Medical History Relevant to Rehab: PMH: COPD, Parkinson's disorder, history of factor V Leiden mutation with DVT and PE in the past & appears to not be on anticoagulation currently and is s/p IVC filter, depression/anxiety & PTSD & diastolic CHF  Family/Caregiver Present: No  Prior to Session Communication: Bedside nurse, PCT/RACHEL/KEVON  Patient Position Received: Bed, 3 rail up, Alarm on  Preferred Learning Style: verbal, visual  General Comment: Pt continues to have difficulty with task focus and concentration. demonstrates need for continued supervision for safety and efficacy of self-care and related mobility.  Precautions:  Medical Precautions: Fall precautions    Pain  Assessment:  Pain Assessment  Pain Assessment: 0-10  Pain Score: 0 - No pain     Objective   Activities of Daily Living: Feeding  Feeding Level of Assistance: Setup, Close supervision  Feeding Where Assessed: Edge of bed  Feeding Comments: Pt required set-up in quiet environment, required single step sequenced prompting and introduction of items individually for task execution.    Outcome Measures:  [facility] Daily Activity  Putting on and taking off regular lower body clothing: A little  Bathing (including washing, rinsing, drying): A little  Putting on and taking off regular upper body clothing: A little  Toileting, which includes using toilet, bedpan or urinal: A lot  Taking care of personal grooming such as brushing teeth: A little  Eating Meals: A little  Daily Activity - Total Score: 17    Education Documentation  Precautions, taught by Obed Florian OT at 3/18/2024 10:50 AM.  Learner: Patient  Readiness: Acceptance  Method: Explanation, Demonstration  Response: Verbalizes Understanding, Demonstrated Understanding, Needs Reinforcement, No Evidence of Learning    ADL Training, taught by Obed Florian OT at 3/18/2024 10:50 AM.  Learner: Patient  Readiness: Acceptance  Method: Explanation, Demonstration  Response: Verbalizes Understanding, Demonstrated Understanding, Needs Reinforcement, No Evidence of Learning    Body Mechanics, taught by Obed Florian OT at 3/18/2024 10:50 AM.  Learner: Patient  Readiness: Acceptance  Method: Explanation, Demonstration  Response: Verbalizes Understanding, Demonstrated Understanding, Needs Reinforcement, No Evidence of Learning    Education Comments  Pt does not demonstrate ability to display follow-through of education provided.    Goals:  Encounter Problems       Encounter Problems (Active)       OT Goals       Pt will demo functional transfers to/ from EOB, chair and commode sup and LRD (Progressing)       Start:  03/04/24    Expected End:  03/18/24            Pt will demo  functional mobility necessary to complete ADL routine sup and LRD (Progressing)       Start:  03/04/24    Expected End:  03/18/24            Pt will demo ADL routine and meaningful daily activities with sup using modifications as needed  (Progressing)       Start:  03/04/24    Expected End:  03/18/24            Pt will demo improved static/ dynamic standing balance, evidenced by completion of BADL or functional activity with < SB assist for duration of activity.   (Progressing)       Start:  03/04/24    Expected End:  03/18/24            Pt will demo improved activity tolerance evidenced by participation in BADL and/or functional mobility x10 mins with </=1 rest break.   (Progressing)       Start:  03/04/24    Expected End:  03/18/24

## 2024-03-18 NOTE — NURSING NOTE
"Discharge instructions reviewed with patient. Reviewed multiple times with patient which meds he needs to stop taking and highlighted these meds on his paperwork. Patient states he understands and \"writes everything down at home\". He states his friend will have his keys to his apartment when he arrives and has his oxygen at home.  Patient verbalized understanding. Handout on all meds given. Waiting for ambulance ride to arrive  Scripts picked up from outpatient pharmacy and given to patient.  "

## 2024-03-18 NOTE — PROGRESS NOTES
Chris Wu is a 68 y.o. male on day 17 of admission presenting with Encephalopathy.      Subjective    No acute events overnight    Objective     Last Recorded Vitals  /67 (BP Location: Left arm, Patient Position: Lying)   Pulse 77   Temp 37.2 °C (99 °F) (Temporal)   Resp 16   Wt 76 kg (167 lb 8.8 oz)   SpO2 93%   Intake/Output last 3 Shifts:    Intake/Output Summary (Last 24 hours) at 3/18/2024 1802  Last data filed at 3/18/2024 1020  Gross per 24 hour   Intake 480 ml   Output --   Net 480 ml         Admission Weight  Weight: 72.6 kg (160 lb) (03/01/24 1533)    Daily Weight  03/11/24 : 76 kg (167 lb 8.8 oz)    Image Results  CT head wo IV contrast  Narrative: Interpreted By:  Jessica Claudio and Muddasani Dheeraj   STUDY:  CT HEAD WO IV CONTRAST;  3/16/2024 1:21 am      INDICATION:  Signs/Symptoms:reassess SDH, on anticoagulation.      COMPARISON:  CT of the head 03/15/2024. MRI of the brain 03/14/2024.      ACCESSION NUMBER(S):  [id number]      ORDERING CLINICIAN:  PEACE GILMORE      TECHNIQUE:  Noncontrast axial CT of the head was performed. Angled reformats in  brain and bone windows were generated. The images were reviewed in  bone, brain, blood and soft tissue windows. Coronal and sagittal  reformats were provided for review.      FINDINGS:  CSF Spaces: The subdural collections demonstrated on prior MRI from  March 14, 2024 are not well seen on the basis of this examination.  There is prominence of ventricles and sulci compatible with diffuse  parenchymal volume loss. There is again prominent retrocerebellar CSF  space which may represent a natalee cisterna magna or retrocerebellar  arachnoid cyst.      Parenchyma: Grey-white differentiation is intact. Moderate white  matter hypoattenuation is compatible with small-vessel ischemic  change. Hypodensities within the bilateral basal ganglia and may  represent prominent perivascular spaces versus lacunar infarcts. No  mass effect or midline  shift. No new intracranial hemorrhage is  identified.      Calvarium: Unremarkable.      Paranasal sinuses and mastoids: Visualized paranasal sinuses and  mastoid air cells are well-aerated.      Impression: The small subdural collections demonstrated on recent MRI are not  well visualized on the basis of CT. No new, acute intracranial  hemorrhage is identified.      Extensive white matter changes are nonspecific but most likely  represent small-vessel ischemic disease in a patient of this age.  Scattered lacunar infarcts are suspected as well.      Diffuse parenchymal volume loss.      I personally reviewed the images/study and I agree with the findings  as stated by Dr. Mcfadden.      MACRO:  None      Signed by: Jessica Claudio 3/16/2024 8:54 AM  Dictation workstation:   [id number]      Physical Exam  Constitutional: resting comfortably in bed, no acute distress   Eyes: making eye contact  Respiratory/Thorax: CTA bilaterally, no acute respiratory distress  Cardiovascular: regular rate and rhythm  Gastrointestinal: Nondistended, soft, non-tender  Musculoskeletal: no significant peripheral edema appreciated   Neurological: Alert, oriented to self, hospital, year.  Disoriented to event.  Remains confused regarding situation  Psychological: Calm, thought content more clear, remains mildly tangential   Skin: Warm and dry      Assessment/Plan      Chris Wu is a 68 y.o. male with pertinent hx of COPD, Parkinson's disorder, history of factor V Leiden mutation with DVT and PE in the past not on anticoagulation currently s/p IVC filter, depression/anxiety & PTSD & diastolic CHF who was admitted to the hospital secondary to acute encephalopathy.     Acute bilateral subdural hemorrhage  Neurosurgery at [facility], Dr. Trevino, reviewed the films and felt that bleeding was chronic and further advised to hold Eliquis and start heparin and if repeat CT is stable can restart Eliquis  Repeat is stable will restart Eliquis  As  imaging is stable patient will not require neurosurgery follow-up    Acute toxic encephalopathy due to polypharmacy/dementia/Parkinson's disease/depression/PTSD/ADD/ANDREA  Suspect altered mentation is due to baseline cognitive impairment compounded with polypharmacy    Home medications that may be contributing to altered mentation:  Suboxone daily  BuSpar 20 mg 3 times daily  Sinemet  3 times daily  Lexapro 20 mg daily  Gabapentin 600 mg 3 times daily  Atarax 25 mg twice daily  Ativan 1 mg twice daily as needed for anxiety  Seroquel 50 mg 4 times daily as needed for anxiety  Trazodone 100 mg once daily at bedtime      Current medications:   Suboxone twice daily, dose unchanged  Sinemet twice daily, dose unchanged  Lexapro 20 mg daily, dose unchanged  Gabapentin 300 mg 3 times daily, dose decreased  Atarax discontinued  Lorazepam 0.5 mg every 8 hours, dose decreased (tapering)  Seroquel changed to 25 mg 4 times daily as needed for agitation, dose decreased  Trazodone discontinued    Per [facility] report, oxazepam and lorazepam started at the end of January. He picked up prescriptions every week with pill count ranging from 28 to 56 pills. He has been reliably picking up his Suboxone and gabapentin for at least the past couple of years.     Extensive left lower extremity DVT  Continue Eliquis    Incisional 6 mm right upper lobe nodule  Follow-up with pulmonology and primary care as a outpatient for further evaluation    Chronic proximal respiratory failure due to COPD  On supplemental oxygen at 4 L via nasal cannula    Inability to care for self/lack mental capacity  MoCA score 6  PT and OT recommend moderate intensity in therapy  Patient has intermittently agreed and refused placement skilled nursing facility  Medications that may be adding to patient's cognitive delay have been decreased with the aid of psychiatry.  Subdural hemorrhage on MRI was discussed with neurology and this is found to be  "chronic      GERD  Continue PPI    DVT prophylaxis  Heparin drip    Disposition  MoCA score of 6  On reevaluation of patient's capacity it seems he has been having trouble hearing during his hospitalization and his hearing aids are not working appropriately.  As a raise level of my voice he is able to respond appropriately to my questions.  He reported he understood our concerns of him potentially falling if he were to return home.  He also reiterated that we believe he would benefit from placement in a skilled nursing facility where he would potentially improve his strength, coordination and balance or as he put it, \"build me up \".  This seems as if he was able to reason through the information given, understand the consequences of his decision and make a decision.  He is found to have capacity today  I suspect the MoCA level of 6 is in part related to his difficulty hearing      Malnutrition Diagnosis Status: New  Malnutrition Diagnosis: Severe malnutrition related to acute disease or injury  As Evidenced by: significant 5% weight loss/~ 1 week, loss of muscle and adipose stores, consuming less than 50% of estimtaed nutrition needs over past 5 days  I agree with the dietitian's malnutrition diagnosis.  Malvin Wilcox, DO      "

## 2024-03-18 NOTE — SIGNIFICANT EVENT
"caCapacity Assessment Tool    \"Capacity\" is the \"ability\" to make a decision.  The decision in question must be specific (one decision), relevant to a patient's current condition (appropriate), and timely (neither prospective nor retrospective).    Capacity varies based on knowledge base (explanation/understanding of clinical information), cognitive processing, acute psychiatric illness, and other clinical conditions.    In order to be deemed \"capacitated\" to make a single decision at one point in time, a patient must demonstrate all 4 of the following elements:    *Ability to consistently communicate a choice (consistent over time with adequate information)  *Ability to understand the relevant information (accurate knowledge of condition)  *Ability to appreciate the situation and its consequences (risks/benefits, pros/cons)  *Ability to reason about treatment options (without undue influence of a person or condition, eg. suicidality or acute psychosis)      Current Decision    Clinical issue:   The staff including myself were concerned about his ability to care for himself.  Upon reevaluating his capacity today the patient maintains the ability to system communicate choice, appears to understand relevant information about his current situation.  He further understands our concerns of his risk in returning home and the benefits that we believe he may get from a skilled nursing facility and has continued to choose to return home.    Did the appropriate team address relevant information with the patient:  Yes    Date: 03/18/24    If \"NO\" is selected for appropriate team, then please discuss with the appropriate team.  The appropriate team should be encouraged to address relevant information with the patient AND reevaluate capacity when appropriate.    Capacity Evaluation    Patient demonstrates ability to consistently communicate choice:  Yes this is consistent with prior evaluation    Patient demonstrates ability to " "understand the relevant information:  Yes after further discussion today patient was able to tell me of my concerns and his choice to return home versus going to a skilled nursing facility.    Patient demonstrates ability to appreciate the situation and its consequences:  Yes patient reports there is a chance upon returning home that he may fall again and not be able to get up    Patient demonstrates ability to reason about treatment options:  Yes patient understands our desire for him to go to a skilled nursing facility is for him to regain his strength balance and coordinatio, in his words, \"build me up\"    If ANY of the above items are answered \"NO,\" the patient LACKS CAPACITY for that specific decision at hand, at that specific time.  Further capacity evaluations can be done as needed.    Patient's capacity may have changed and also during my last evaluation he may have had difficulty hearing me.  On today's evaluation I spoke loudly and it seemed as if he understood.  As I mentioned in a prior note his friends felt that he was doing well prior to a medication change and once he returned to his baseline he would likely be able to return home.         "

## 2024-03-20 ENCOUNTER — PATIENT OUTREACH (OUTPATIENT)
Dept: CARE COORDINATION | Facility: CLINIC | Age: 69
End: 2024-03-20
Payer: MEDICARE

## 2024-03-20 DIAGNOSIS — F51.01 PRIMARY INSOMNIA: Primary | ICD-10-CM

## 2024-03-20 DIAGNOSIS — F41.1 GENERALIZED ANXIETY DISORDER: ICD-10-CM

## 2024-03-20 DIAGNOSIS — K21.9 GASTROESOPHAGEAL REFLUX DISEASE, UNSPECIFIED WHETHER ESOPHAGITIS PRESENT: ICD-10-CM

## 2024-03-20 DIAGNOSIS — F43.10 PTSD (POST-TRAUMATIC STRESS DISORDER): ICD-10-CM

## 2024-03-20 DIAGNOSIS — E46 MALNUTRITION, UNSPECIFIED TYPE (MULTI): ICD-10-CM

## 2024-03-20 DIAGNOSIS — J44.1 COPD EXACERBATION (MULTI): ICD-10-CM

## 2024-03-20 DIAGNOSIS — E78.5 HYPERLIPIDEMIA, UNSPECIFIED HYPERLIPIDEMIA TYPE: ICD-10-CM

## 2024-03-20 DIAGNOSIS — J44.9 COPD, VERY SEVERE (MULTI): ICD-10-CM

## 2024-03-20 DIAGNOSIS — G20.A1 PARKINSON'S DISEASE (MULTI): ICD-10-CM

## 2024-03-20 DIAGNOSIS — G62.9 NEUROPATHY: ICD-10-CM

## 2024-03-20 DIAGNOSIS — F31.61 BIPOLAR DISORDER, CURRENT EPISODE MIXED, MILD (MULTI): ICD-10-CM

## 2024-03-20 DIAGNOSIS — I82.4Y9 DEEP VEIN THROMBOSIS (DVT) OF PROXIMAL LOWER EXTREMITY, UNSPECIFIED CHRONICITY, UNSPECIFIED LATERALITY (MULTI): ICD-10-CM

## 2024-03-20 NOTE — SIGNIFICANT EVENT
Follow Up Phone Call    Outgoing phone call    Spoke to: Chris Wu Relationship:self   Phone number: [phone]      Outcome: contacted patient/ family   Chief Complaint   Patient presents with    Not acting right per [facility]-concerned for recent falls.           Diagnosis:Not applicable    States he is feeling better. No further questions or concerns.

## 2024-03-20 NOTE — PROGRESS NOTES
Discharge Facility: [facility]  Discharge Diagnosis: Acute toxic encephalopathy due to polypharmacy, baseline dementia, Parkinson's disease, depression, PTSD, ADD, ANDREA, bilateral subdural hemorrhage, left lower extremity DVT, 6 mm right upper lobe nodule, chronic hypoxic respiratory failure due to COPD, GERD   Admission Date: 3/1/24  Discharge Date: 3/18/24    PCP Appointment Date: no appointment, message to office  Specialist Appointment Date: n/a  Hospital Encounter and Summary: Linked  Unsuccessful attempts to reach patient x2

## 2024-03-21 DIAGNOSIS — I50.33 CHF (CONGESTIVE HEART FAILURE), NYHA CLASS III, ACUTE ON CHRONIC, DIASTOLIC (MULTI): Primary | ICD-10-CM

## 2024-03-21 DIAGNOSIS — J44.9 COPD, VERY SEVERE (MULTI): ICD-10-CM

## 2024-03-21 RX ORDER — TORSEMIDE 10 MG/1
10 TABLET ORAL DAILY
Qty: 30 TABLET | Refills: 11 | Status: SHIPPED | OUTPATIENT
Start: 2024-03-21 | End: 2025-03-21

## 2024-03-21 RX ORDER — TRAZODONE HYDROCHLORIDE 100 MG/1
100 TABLET ORAL NIGHTLY
Qty: 90 TABLET | Refills: 1 | Status: SHIPPED | OUTPATIENT
Start: 2024-03-21

## 2024-03-21 RX ORDER — LANOLIN ALCOHOL/MO/W.PET/CERES
100 CREAM (GRAM) TOPICAL DAILY
Qty: 30 TABLET | Refills: 11 | Status: SHIPPED | OUTPATIENT
Start: 2024-03-21

## 2024-03-21 RX ORDER — FOLIC ACID 1 MG/1
1 TABLET ORAL DAILY
Qty: 30 TABLET | Refills: 11 | Status: SHIPPED | OUTPATIENT
Start: 2024-03-21

## 2024-03-21 RX ORDER — ESCITALOPRAM OXALATE 20 MG/1
20 TABLET ORAL DAILY
Qty: 90 TABLET | Refills: 1 | Status: SHIPPED | OUTPATIENT
Start: 2024-03-21 | End: 2024-09-17

## 2024-03-21 RX ORDER — ATORVASTATIN CALCIUM 20 MG/1
20 TABLET, FILM COATED ORAL DAILY
Qty: 30 TABLET | Refills: 11 | Status: SHIPPED | OUTPATIENT
Start: 2024-03-21

## 2024-03-21 RX ORDER — BUDESONIDE, GLYCOPYRROLATE, AND FORMOTEROL FUMARATE 160; 9; 4.8 UG/1; UG/1; UG/1
2 AEROSOL, METERED RESPIRATORY (INHALATION) 2 TIMES DAILY
Qty: 10.7 G | Refills: 5 | Status: SHIPPED | OUTPATIENT
Start: 2024-03-21

## 2024-03-21 RX ORDER — CARBIDOPA AND LEVODOPA 25; 100 MG/1; MG/1
1 TABLET ORAL 3 TIMES DAILY
Qty: 90 TABLET | Refills: 11 | Status: SHIPPED | OUTPATIENT
Start: 2024-03-21

## 2024-03-21 RX ORDER — QUETIAPINE FUMARATE 25 MG/1
25 TABLET, FILM COATED ORAL 3 TIMES DAILY PRN
Qty: 90 TABLET | Refills: 11 | Status: SHIPPED | OUTPATIENT
Start: 2024-03-21

## 2024-03-21 RX ORDER — GABAPENTIN 300 MG/1
300 CAPSULE ORAL 3 TIMES DAILY
Qty: 90 CAPSULE | Refills: 11 | Status: SHIPPED | OUTPATIENT
Start: 2024-03-21

## 2024-03-21 RX ORDER — POTASSIUM CHLORIDE 20 MEQ/1
20 TABLET, EXTENDED RELEASE ORAL DAILY
Qty: 30 TABLET | Refills: 11 | Status: SHIPPED | OUTPATIENT
Start: 2024-03-21 | End: 2025-03-21

## 2024-03-21 RX ORDER — HYDROXYZINE HYDROCHLORIDE 25 MG/1
25 TABLET, FILM COATED ORAL EVERY 8 HOURS PRN
Qty: 90 TABLET | Refills: 11 | Status: SHIPPED | OUTPATIENT
Start: 2024-03-21

## 2024-03-21 RX ORDER — PANTOPRAZOLE SODIUM 40 MG/1
40 TABLET, DELAYED RELEASE ORAL DAILY
Qty: 30 TABLET | Refills: 11 | Status: SHIPPED | OUTPATIENT
Start: 2024-03-21

## 2024-03-21 NOTE — PROGRESS NOTES
"Called and spoke with general pharmacy and reviewed entire list of medications.  Ordered as appropriate.  They had canceled his profile and all of his medications.  I have reordered appropriate medications to be picked up today.  Spoke with  Will regarding medications and medication safety he will be his medications today and going to his home to deliver them.  He will review them as they have been ordered according to what is in his own home.  Follow-up with Rashida Wu yesterday at great length about the safety of some of the medications he is requesting.  Let him know that I will not be refilling anything that is controlled such as his benzodiazepines.  He does have several medications ordered anxiety.  He is not see to manage his medications on his own.  Let him know his requests will be deferred other than his regular maintenance medications and safe medications for anxiety given his diagnosis of addiction.  Patient: Chris Wu  : 1955  PCP: DOMINIQUE Chandra  MRN: [id number]  Program: Transitional Care Management  Status: Enrolled  Effective Dates: 3/20/2024 - present  Responsible Staff: Chika Brown RN  Social Determinants to be Addressed: Alcohol Use, Financial Resource Strain, Physical Activity, Stress, Tobacco Use         Chris Wu is a 68 y.o. male presenting today for follow-up after being discharged from the hospital 3 days ago. The main problem requiring admission was encephalopathy.  The discharge summary and/or Transitional Care Management documentation was reviewed. Medication reconciliation was performed as indicated via the \"Ernesto as Reviewed\" timestamp.     Chris Wu was contacted by Transitional Care Management services two days after his discharge. This encounter and supporting documentation was reviewed.    Review of Systems    There were no vitals taken for this visit.    Physical Exam    The complexity of medical decision making for this " patient's transitional care is high.    Assessment/Plan   Problem List Items Addressed This Visit             ICD-10-CM    Bipolar disorder, current episode mixed, mild (CMS/[facility]) F31.61    Relevant Medications    QUEtiapine (SEROquel) 25 mg tablet    Parkinson's disease G20.A1    Relevant Medications    carbidopa-levodopa (Sinemet)  mg tablet    COPD exacerbation (CMS/[facility]) J44.1    Relevant Medications    torsemide (Demadex) 10 mg tablet    potassium chloride CR (Klor-Con M20) 20 mEq ER tablet    COPD, very severe (CMS/[facility]) J44.9    Relevant Medications    budesonide-glycopyr-formoterol (Breztri Aerosphere) 160-9-4.8 mcg/actuation HFA aerosol inhaler    tiotropium (Spiriva Respimat) 2.5 mcg/actuation inhaler    Generalized anxiety disorder F41.1    Relevant Medications    escitalopram (Lexapro) 20 mg tablet    PTSD (post-traumatic stress disorder) F43.10    Relevant Medications    QUEtiapine (SEROquel) 25 mg tablet    Insomnia - Primary G47.00    Relevant Medications    traZODone (Desyrel) 100 mg tablet    hydrOXYzine HCL (Atarax) 25 mg tablet     Other Visit Diagnoses         Codes    Deep vein thrombosis (DVT) of proximal lower extremity, unspecified chronicity, unspecified laterality (CMS/[facility])     I82.4Y9    Relevant Medications    apixaban (Eliquis) 5 mg tablet    Hyperlipidemia, unspecified hyperlipidemia type     E78.5    Relevant Medications    atorvastatin (Lipitor) 20 mg tablet    Malnutrition, unspecified type (CMS/[facility])     E46    Relevant Medications    folic acid (Folvite) 1 mg tablet    thiamine (Vitamin B-1) 100 mg tablet    Neuropathy     G62.9    Relevant Medications    gabapentin (Neurontin) 300 mg capsule    Gastroesophageal reflux disease, unspecified whether esophagitis present     K21.9    Relevant Medications    pantoprazole (ProtoNix) 40 mg EC tablet

## 2024-03-22 ENCOUNTER — TELEPHONE (OUTPATIENT)
Dept: PRIMARY CARE | Facility: CLINIC | Age: 69
End: 2024-03-22
Payer: MEDICARE

## 2024-03-27 ENCOUNTER — APPOINTMENT (OUTPATIENT)
Dept: PRIMARY CARE | Facility: CLINIC | Age: 69
End: 2024-03-27
Payer: MEDICARE

## 2024-03-27 DIAGNOSIS — J44.9 END STAGE CHRONIC OBSTRUCTIVE PULMONARY DISEASE (MULTI): Primary | ICD-10-CM

## 2024-04-01 ENCOUNTER — TELEPHONE (OUTPATIENT)
Dept: PRIMARY CARE | Facility: CLINIC | Age: 69
End: 2024-04-01
Payer: MEDICARE

## 2024-04-01 NOTE — TELEPHONE ENCOUNTER
Spoke with Darell from adult.  Discussed poor decision-making with patient.  Unable to give insight due to the fact that I do not know what is going on in his home.  Hospice referral placed.   is handling communication with care  No longer in need for patient come to the office due to end-stage COPD.  Patient and  are both aware.  Made Adult Protective Services aware of my assessment and plan.

## 2024-04-02 DIAGNOSIS — R82.90 ABNORMAL FINDING ON URINALYSIS: Primary | ICD-10-CM

## 2024-04-02 PROCEDURE — 81001 URINALYSIS AUTO W/SCOPE: CPT

## 2024-04-03 ENCOUNTER — LAB REQUISITION (OUTPATIENT)
Dept: LAB | Facility: HOSPITAL | Age: 69
End: 2024-04-03
Payer: MEDICARE

## 2024-04-03 LAB
APPEARANCE UR: CLEAR
BILIRUB UR STRIP.AUTO-MCNC: NEGATIVE MG/DL
COLOR UR: ABNORMAL
GLUCOSE UR STRIP.AUTO-MCNC: NORMAL MG/DL
HOLD SPECIMEN: NORMAL
KETONES UR STRIP.AUTO-MCNC: NEGATIVE MG/DL
LEUKOCYTE ESTERASE UR QL STRIP.AUTO: NEGATIVE
NITRITE UR QL STRIP.AUTO: NEGATIVE
PH UR STRIP.AUTO: 8 [PH]
PROT UR STRIP.AUTO-MCNC: NEGATIVE MG/DL
RBC # UR STRIP.AUTO: ABNORMAL /UL
RBC #/AREA URNS AUTO: NORMAL /HPF
SP GR UR STRIP.AUTO: 1.01
UROBILINOGEN UR STRIP.AUTO-MCNC: NORMAL MG/DL
WBC #/AREA URNS AUTO: NORMAL /HPF